# Patient Record
Sex: FEMALE | Race: WHITE | Employment: OTHER | URBAN - METROPOLITAN AREA
[De-identification: names, ages, dates, MRNs, and addresses within clinical notes are randomized per-mention and may not be internally consistent; named-entity substitution may affect disease eponyms.]

---

## 2021-12-19 ENCOUNTER — PREP FOR PROCEDURE (OUTPATIENT)
Dept: OPHTHALMOLOGY | Age: 76
End: 2021-12-19

## 2021-12-19 RX ORDER — PREDNISOLONE ACETATE 10 MG/ML
1 SUSPENSION/ DROPS OPHTHALMIC SEE ADMIN INSTRUCTIONS
Status: CANCELLED | OUTPATIENT
Start: 2021-12-19

## 2021-12-19 RX ORDER — TROPICAMIDE 10 MG/ML
1 SOLUTION/ DROPS OPHTHALMIC SEE ADMIN INSTRUCTIONS
Status: CANCELLED | OUTPATIENT
Start: 2021-12-19

## 2021-12-19 RX ORDER — PHENYLEPHRINE HYDROCHLORIDE 25 MG/ML
1 SOLUTION/ DROPS OPHTHALMIC SEE ADMIN INSTRUCTIONS
Status: CANCELLED | OUTPATIENT
Start: 2021-12-19

## 2021-12-19 RX ORDER — BRIMONIDINE TARTRATE 2 MG/ML
1 SOLUTION/ DROPS OPHTHALMIC SEE ADMIN INSTRUCTIONS
Status: CANCELLED | OUTPATIENT
Start: 2021-12-19

## 2021-12-20 ENCOUNTER — HOSPITAL ENCOUNTER (OUTPATIENT)
Dept: SURGERY | Age: 76
Discharge: HOME | End: 2021-12-20
Payer: MEDICARE

## 2021-12-20 VITALS — SYSTOLIC BLOOD PRESSURE: 157 MMHG | DIASTOLIC BLOOD PRESSURE: 69 MMHG

## 2021-12-20 PROCEDURE — 66821 AFTER CATARACT LASER SURGERY: CPT

## 2021-12-20 PROCEDURE — 6370000000 HC RX 637 (ALT 250 FOR IP): Performed by: OPHTHALMOLOGY

## 2021-12-20 RX ORDER — ESOMEPRAZOLE MAGNESIUM 20 MG/1
FOR SUSPENSION ORAL
COMMUNITY
End: 2022-06-09 | Stop reason: ALTCHOICE

## 2021-12-20 RX ORDER — TROPICAMIDE 10 MG/ML
1 SOLUTION/ DROPS OPHTHALMIC SEE ADMIN INSTRUCTIONS
Status: COMPLETED | OUTPATIENT
Start: 2021-12-20 | End: 2021-12-20

## 2021-12-20 RX ORDER — ZOLPIDEM TARTRATE 10 MG/1
10 TABLET ORAL NIGHTLY PRN
COMMUNITY
Start: 2021-11-29 | End: 2021-12-29

## 2021-12-20 RX ORDER — SERTRALINE HYDROCHLORIDE 100 MG/1
100 TABLET, FILM COATED ORAL DAILY
COMMUNITY
Start: 2021-09-17

## 2021-12-20 RX ORDER — PSEUDOEPHEDRINE HCL 30 MG
100 TABLET ORAL 2 TIMES DAILY
COMMUNITY

## 2021-12-20 RX ORDER — ACYCLOVIR 400 MG/1
400 TABLET ORAL 2 TIMES DAILY
COMMUNITY
Start: 2021-03-17

## 2021-12-20 RX ORDER — FELODIPINE 5 MG/1
5 TABLET, EXTENDED RELEASE ORAL DAILY
COMMUNITY
Start: 2021-07-19

## 2021-12-20 RX ORDER — HYDRALAZINE HYDROCHLORIDE 25 MG/1
25 TABLET, FILM COATED ORAL 2 TIMES DAILY
COMMUNITY
Start: 2021-09-27

## 2021-12-20 RX ORDER — SIMVASTATIN 40 MG
40 TABLET ORAL NIGHTLY
COMMUNITY
Start: 2021-07-07

## 2021-12-20 RX ORDER — LOSARTAN POTASSIUM 100 MG/1
100 TABLET ORAL DAILY
COMMUNITY
Start: 2021-06-15 | End: 2022-05-05 | Stop reason: DRUGHIGH

## 2021-12-20 RX ORDER — SELENIUM 200 MCG
2 TABLET ORAL
COMMUNITY

## 2021-12-20 RX ORDER — BRIMONIDINE TARTRATE 2 MG/ML
1 SOLUTION/ DROPS OPHTHALMIC SEE ADMIN INSTRUCTIONS
Status: COMPLETED | OUTPATIENT
Start: 2021-12-20 | End: 2021-12-20

## 2021-12-20 RX ORDER — PREDNISOLONE ACETATE 10 MG/ML
1 SUSPENSION/ DROPS OPHTHALMIC SEE ADMIN INSTRUCTIONS
Status: COMPLETED | OUTPATIENT
Start: 2021-12-20 | End: 2021-12-20

## 2021-12-20 RX ORDER — GABAPENTIN 300 MG/1
CAPSULE ORAL
COMMUNITY
Start: 2021-07-13 | End: 2022-08-05 | Stop reason: SDUPTHER

## 2021-12-20 RX ORDER — CLONIDINE HYDROCHLORIDE 0.2 MG/1
0.2 TABLET ORAL 3 TIMES DAILY
COMMUNITY
Start: 2021-10-25 | End: 2022-08-23 | Stop reason: DRUGHIGH

## 2021-12-20 RX ORDER — LORATADINE 10 MG/1
10 TABLET ORAL DAILY
COMMUNITY

## 2021-12-20 RX ORDER — ACETAMINOPHEN 325 MG/1
650 TABLET ORAL
COMMUNITY

## 2021-12-20 RX ORDER — ATENOLOL 50 MG/1
50 TABLET ORAL 2 TIMES DAILY
COMMUNITY
Start: 2021-08-10

## 2021-12-20 RX ORDER — PHENYLEPHRINE HCL 2.5 %
1 DROPS OPHTHALMIC (EYE) SEE ADMIN INSTRUCTIONS
Status: COMPLETED | OUTPATIENT
Start: 2021-12-20 | End: 2021-12-20

## 2021-12-20 RX ADMIN — TROPICAMIDE 1 DROP: 10 SOLUTION/ DROPS OPHTHALMIC at 12:40

## 2021-12-20 RX ADMIN — TROPICAMIDE 1 DROP: 10 SOLUTION/ DROPS OPHTHALMIC at 12:37

## 2021-12-20 RX ADMIN — PHENYLEPHRINE HYDROCHLORIDE 1 DROP: 25 SOLUTION/ DROPS OPHTHALMIC at 12:37

## 2021-12-20 RX ADMIN — PHENYLEPHRINE HYDROCHLORIDE 1 DROP: 25 SOLUTION/ DROPS OPHTHALMIC at 12:40

## 2021-12-20 RX ADMIN — PREDNISOLONE ACETATE 1 DROP: 10 SUSPENSION/ DROPS OPHTHALMIC at 13:10

## 2021-12-20 RX ADMIN — PHENYLEPHRINE HYDROCHLORIDE 1 DROP: 25 SOLUTION/ DROPS OPHTHALMIC at 12:32

## 2021-12-20 RX ADMIN — TROPICAMIDE 1 DROP: 10 SOLUTION/ DROPS OPHTHALMIC at 12:32

## 2021-12-20 RX ADMIN — BRIMONIDINE TARTRATE 1 DROP: 2 SOLUTION OPHTHALMIC at 13:10

## 2021-12-20 ASSESSMENT — PAIN - FUNCTIONAL ASSESSMENT: PAIN_FUNCTIONAL_ASSESSMENT: 0-10

## 2021-12-20 NOTE — PROGRESS NOTES
Patient: Kashmir Mejia  1945, 76 y.o./female    Ambulatory to laser room. Right eye marked and numbed by Dr. Taylor Lambert. Laser procedure done and tolerated well by patient. Post  instructions given to Elly Christopher by provider.       Power setting was 1.2 mj        # of shots was 42    Total power was 52.8 mJ    Gladis Jimenes RN

## 2021-12-20 NOTE — OP NOTE
OPERATIVE NOTE    [facility]  [address], [address]  [phone]      12/20/2021    Patient name: Amarjit De La Cruz  YOB: 1945  MRN: [id number]         DATE OF OPERATION: 12/20/2021     SURGEON: Michael Palomares MD  ASSISTANT(S): None           PREOPERATIVE DIAGNOSIS(ES): Posterior Capsule Opacification- Right eye  POSTOPERATIVE DIAGNOSIS(ES):  Same    OPERATION(S) PERFORMED: Posterior capsulotomy per YAG laser- Right eye    ESTIMATED BLOOD LOSS:  None  TYPE OF ANESTHESIA:  Proparacaine (Alcaine) 0.5% one drop to operative eye    NO SPECIMENS OBTAINED    INDICATIONS FOR PROCEDURE:  Patient complains of decrease vision in operative eye    DESCRIPTION OF PROCEDURE: The patient's operative eye was dilated with 1 drop of Tropicamide 1% opthalmic solution and 1 drop of 2.5% phenylephrine ophthalmic solution preoperatively. The patient was brought to the [facility] room. The operative eye was identified. A drop of Proparacaine was placed in the eye. The YAG Capsulotomy lens was placed on the operative eye. The YAG laser was applied and the posterior capsule was opened. The YAG Capsulotomy lens was removed. Alphagan P and Pred Forte 1% were given to the patient operatively in the PACU. The patient was discharged in good condition. ATTENDING ATTESTATION: I was present and scrubbed for the entire procedure.       ELECTRONICALLY SIGNED BY: Michael Palomares MD, 12/20/2021 2:04 PM

## 2022-01-13 RX ORDER — TROPICAMIDE 10 MG/ML
1 SOLUTION/ DROPS OPHTHALMIC SEE ADMIN INSTRUCTIONS
Status: CANCELLED | OUTPATIENT
Start: 2022-01-13

## 2022-01-13 RX ORDER — PHENYLEPHRINE HYDROCHLORIDE 25 MG/ML
1 SOLUTION/ DROPS OPHTHALMIC SEE ADMIN INSTRUCTIONS
Status: CANCELLED | OUTPATIENT
Start: 2022-01-13

## 2022-01-13 RX ORDER — PREDNISOLONE ACETATE 10 MG/ML
1 SUSPENSION/ DROPS OPHTHALMIC SEE ADMIN INSTRUCTIONS
Status: CANCELLED | OUTPATIENT
Start: 2022-01-13

## 2022-01-13 RX ORDER — BRIMONIDINE TARTRATE 2 MG/ML
1 SOLUTION/ DROPS OPHTHALMIC SEE ADMIN INSTRUCTIONS
Status: CANCELLED | OUTPATIENT
Start: 2022-01-13

## 2022-01-14 NOTE — PRE-PROCEDURE INSTRUCTIONS
[address]        Pre-Op Phone Call:     Patient Name: Chandrika Jimenez     Telephone Information:   Mobile [phone]     Home phone:  [phone]    Surgery Time:   0421 96 07 27 Time: 1745      Left extended Message:  Yes     Message left with: Patient      Recent change in health status:  NA       Electronically signed by:  Coni Abdullahi RN at 1/14/2022 10:37 AM

## 2022-01-17 ENCOUNTER — HOSPITAL ENCOUNTER (OUTPATIENT)
Dept: SURGERY | Age: 77
Discharge: HOME | End: 2022-01-17
Payer: MEDICARE

## 2022-01-17 VITALS — SYSTOLIC BLOOD PRESSURE: 197 MMHG | DIASTOLIC BLOOD PRESSURE: 97 MMHG

## 2022-01-17 PROCEDURE — 6370000000 HC RX 637 (ALT 250 FOR IP): Performed by: OPHTHALMOLOGY

## 2022-01-17 PROCEDURE — 66821 AFTER CATARACT LASER SURGERY: CPT

## 2022-01-17 RX ORDER — PREDNISOLONE ACETATE 10 MG/ML
1 SUSPENSION/ DROPS OPHTHALMIC SEE ADMIN INSTRUCTIONS
Status: COMPLETED | OUTPATIENT
Start: 2022-01-17 | End: 2022-01-17

## 2022-01-17 RX ORDER — BRIMONIDINE TARTRATE 2 MG/ML
1 SOLUTION/ DROPS OPHTHALMIC SEE ADMIN INSTRUCTIONS
Status: COMPLETED | OUTPATIENT
Start: 2022-01-17 | End: 2022-01-17

## 2022-01-17 RX ORDER — PHENYLEPHRINE HCL 2.5 %
1 DROPS OPHTHALMIC (EYE) SEE ADMIN INSTRUCTIONS
Status: COMPLETED | OUTPATIENT
Start: 2022-01-17 | End: 2022-01-17

## 2022-01-17 RX ORDER — TROPICAMIDE 10 MG/ML
1 SOLUTION/ DROPS OPHTHALMIC SEE ADMIN INSTRUCTIONS
Status: COMPLETED | OUTPATIENT
Start: 2022-01-17 | End: 2022-01-17

## 2022-01-17 RX ADMIN — PHENYLEPHRINE HYDROCHLORIDE 1 DROP: 25 SOLUTION/ DROPS OPHTHALMIC at 14:46

## 2022-01-17 RX ADMIN — PHENYLEPHRINE HYDROCHLORIDE 1 DROP: 25 SOLUTION/ DROPS OPHTHALMIC at 14:51

## 2022-01-17 RX ADMIN — PHENYLEPHRINE HYDROCHLORIDE 1 DROP: 25 SOLUTION/ DROPS OPHTHALMIC at 14:56

## 2022-01-17 RX ADMIN — BRIMONIDINE TARTRATE 1 DROP: 2 SOLUTION OPHTHALMIC at 15:31

## 2022-01-17 RX ADMIN — TROPICAMIDE 1 DROP: 10 SOLUTION/ DROPS OPHTHALMIC at 14:51

## 2022-01-17 RX ADMIN — PREDNISOLONE ACETATE 1 DROP: 10 SUSPENSION/ DROPS OPHTHALMIC at 15:31

## 2022-01-17 RX ADMIN — TROPICAMIDE 1 DROP: 10 SOLUTION/ DROPS OPHTHALMIC at 14:46

## 2022-01-17 RX ADMIN — TROPICAMIDE 1 DROP: 10 SOLUTION/ DROPS OPHTHALMIC at 14:56

## 2022-01-17 NOTE — PROGRESS NOTES
Discharge instructions reviewed with patient. Written instructions given to patient. Patient verbalizes understanding of instructions.  Guy Richter RN

## 2022-01-17 NOTE — OP NOTE
OPERATIVE NOTE    [facility]  [address], [address]  [phone]      1/17/2022    Patient name: Eugenia Fernandes  YOB: 1945  MRN: [id number]         DATE OF OPERATION: 1/17/2022     SURGEON: Momo Guerrero MD  ASSISTANT(S): None           PREOPERATIVE DIAGNOSIS(ES): Posterior Capsule Opacification- Left eye  POSTOPERATIVE DIAGNOSIS(ES):  Same    OPERATION(S) PERFORMED: Posterior capsulotomy per YAG laser, Left eye    ESTIMATED BLOOD LOSS:  None  TYPE OF ANESTHESIA:  Proparacaine (Alcaine) 0.5% one drop to operative eye    NO SPECIMENS OBTAINED    INDICATIONS FOR PROCEDURE:  Patient complains of decrease vision in operative eye    DESCRIPTION OF PROCEDURE: The patient's operative eye was dilated with 1 drop of Tropicamide 1% opthalmic solution and 1 drop of 2.5% phenylephrine ophthalmic solution preoperatively. The patient was brought to the [facility] room. The operative eye was identified. A drop of Proparacaine was placed in the eye. The YAG Capsulotomy lens was placed on the operative eye. The YAG laser was applied and the posterior capsule was opened. The YAG Capsulotomy lens was removed. Alphagan P and Pred Forte 1% were given to the patient operatively in the PACU. The patient was discharged in good condition. ATTENDING ATTESTATION: I was present and scrubbed for the entire procedure.       ELECTRONICALLY SIGNED BY: Momo Guerrero MD, 1/17/2022 3:20 PM

## 2022-03-07 ENCOUNTER — OFFICE VISIT (OUTPATIENT)
Dept: INTERNAL MEDICINE CLINIC | Age: 77
End: 2022-03-07
Payer: MEDICARE

## 2022-03-07 VITALS
RESPIRATION RATE: 16 BRPM | WEIGHT: 153 LBS | TEMPERATURE: 97.3 F | OXYGEN SATURATION: 98 % | DIASTOLIC BLOOD PRESSURE: 80 MMHG | HEART RATE: 50 BPM | SYSTOLIC BLOOD PRESSURE: 126 MMHG

## 2022-03-07 DIAGNOSIS — Z23 NEED FOR PNEUMOCOCCAL VACCINATION: ICD-10-CM

## 2022-03-07 DIAGNOSIS — G89.29 CHRONIC BILATERAL LOW BACK PAIN WITHOUT SCIATICA: ICD-10-CM

## 2022-03-07 DIAGNOSIS — E78.5 HYPERLIPIDEMIA, UNSPECIFIED HYPERLIPIDEMIA TYPE: ICD-10-CM

## 2022-03-07 DIAGNOSIS — M79.7 FIBROMYALGIA: ICD-10-CM

## 2022-03-07 DIAGNOSIS — R73.01 ELEVATED FASTING GLUCOSE: ICD-10-CM

## 2022-03-07 DIAGNOSIS — M54.50 CHRONIC BILATERAL LOW BACK PAIN WITHOUT SCIATICA: ICD-10-CM

## 2022-03-07 DIAGNOSIS — Z76.89 ENCOUNTER TO ESTABLISH CARE: Primary | ICD-10-CM

## 2022-03-07 DIAGNOSIS — F32.A DEPRESSION, UNSPECIFIED DEPRESSION TYPE: ICD-10-CM

## 2022-03-07 DIAGNOSIS — N18.2 CKD (CHRONIC KIDNEY DISEASE) STAGE 2, GFR 60-89 ML/MIN: ICD-10-CM

## 2022-03-07 DIAGNOSIS — I10 PRIMARY HYPERTENSION: ICD-10-CM

## 2022-03-07 DIAGNOSIS — J45.20 MILD INTERMITTENT ASTHMA WITHOUT COMPLICATION: ICD-10-CM

## 2022-03-07 DIAGNOSIS — Z85.3 HISTORY OF LEFT BREAST CANCER: ICD-10-CM

## 2022-03-07 DIAGNOSIS — Z12.31 ENCOUNTER FOR SCREENING MAMMOGRAM FOR MALIGNANT NEOPLASM OF BREAST: ICD-10-CM

## 2022-03-07 PROCEDURE — G8482 FLU IMMUNIZE ORDER/ADMIN: HCPCS | Performed by: NURSE PRACTITIONER

## 2022-03-07 PROCEDURE — 36415 COLL VENOUS BLD VENIPUNCTURE: CPT | Performed by: NURSE PRACTITIONER

## 2022-03-07 PROCEDURE — 1123F ACP DISCUSS/DSCN MKR DOCD: CPT | Performed by: NURSE PRACTITIONER

## 2022-03-07 PROCEDURE — G8421 BMI NOT CALCULATED: HCPCS | Performed by: NURSE PRACTITIONER

## 2022-03-07 PROCEDURE — 4040F PNEUMOC VAC/ADMIN/RCVD: CPT | Performed by: NURSE PRACTITIONER

## 2022-03-07 PROCEDURE — 99204 OFFICE O/P NEW MOD 45 MIN: CPT | Performed by: NURSE PRACTITIONER

## 2022-03-07 PROCEDURE — G8400 PT W/DXA NO RESULTS DOC: HCPCS | Performed by: NURSE PRACTITIONER

## 2022-03-07 PROCEDURE — 90732 PPSV23 VACC 2 YRS+ SUBQ/IM: CPT | Performed by: NURSE PRACTITIONER

## 2022-03-07 PROCEDURE — G0009 ADMIN PNEUMOCOCCAL VACCINE: HCPCS | Performed by: NURSE PRACTITIONER

## 2022-03-07 PROCEDURE — 1090F PRES/ABSN URINE INCON ASSESS: CPT | Performed by: NURSE PRACTITIONER

## 2022-03-07 PROCEDURE — G8427 DOCREV CUR MEDS BY ELIG CLIN: HCPCS | Performed by: NURSE PRACTITIONER

## 2022-03-07 PROCEDURE — 1036F TOBACCO NON-USER: CPT | Performed by: NURSE PRACTITIONER

## 2022-03-07 RX ORDER — METHOCARBAMOL 750 MG/1
TABLET, FILM COATED ORAL
COMMUNITY
Start: 2022-01-24 | End: 2022-06-07 | Stop reason: ALTCHOICE

## 2022-03-07 RX ORDER — ZOLPIDEM TARTRATE 10 MG/1
10 TABLET ORAL NIGHTLY PRN
COMMUNITY
Start: 2022-02-28 | End: 2022-03-24 | Stop reason: SDUPTHER

## 2022-03-07 RX ORDER — VIT C/HESPERIDIN/BIOFLAVONOIDS 500-100 MG
TABLET ORAL
COMMUNITY

## 2022-03-07 SDOH — ECONOMIC STABILITY: FOOD INSECURITY: WITHIN THE PAST 12 MONTHS, YOU WORRIED THAT YOUR FOOD WOULD RUN OUT BEFORE YOU GOT MONEY TO BUY MORE.: NEVER TRUE

## 2022-03-07 SDOH — ECONOMIC STABILITY: FOOD INSECURITY: WITHIN THE PAST 12 MONTHS, THE FOOD YOU BOUGHT JUST DIDN'T LAST AND YOU DIDN'T HAVE MONEY TO GET MORE.: NEVER TRUE

## 2022-03-07 ASSESSMENT — PATIENT HEALTH QUESTIONNAIRE - PHQ9
SUM OF ALL RESPONSES TO PHQ9 QUESTIONS 1 & 2: 0
SUM OF ALL RESPONSES TO PHQ QUESTIONS 1-9: 0
2. FEELING DOWN, DEPRESSED OR HOPELESS: 0
SUM OF ALL RESPONSES TO PHQ QUESTIONS 1-9: 0
SUM OF ALL RESPONSES TO PHQ QUESTIONS 1-9: 0
1. LITTLE INTEREST OR PLEASURE IN DOING THINGS: 0
SUM OF ALL RESPONSES TO PHQ QUESTIONS 1-9: 0

## 2022-03-07 ASSESSMENT — ENCOUNTER SYMPTOMS
BLOOD IN STOOL: 0
CONSTIPATION: 0
BACK PAIN: 1
VOMITING: 0
ABDOMINAL PAIN: 0
COUGH: 0
DIARRHEA: 0
SHORTNESS OF BREATH: 0
NAUSEA: 0

## 2022-03-07 ASSESSMENT — SOCIAL DETERMINANTS OF HEALTH (SDOH): HOW HARD IS IT FOR YOU TO PAY FOR THE VERY BASICS LIKE FOOD, HOUSING, MEDICAL CARE, AND HEATING?: NOT HARD AT ALL

## 2022-03-07 NOTE — PROGRESS NOTES
6/2020  Osteoporosis Screening: normal bone density DEXA 12/2020             There are no problems to display for this patient. Past Medical History:   Diagnosis Date    Arthritis     Asthma     Cancer ([address]     Breast    Chronic kidney disease     Fibromyalgia     Hx of blood clots     Hyperlipidemia     Hypertension     Shingles        Past Surgical History:   Procedure Laterality Date    ARM SURGERY Right     tendonitis    BACK SURGERY Left 2022    BLADDER SURGERY N/A 2019    lift    BREAST SURGERY      CAROTID ENDARTERECTOMY Right     COLONOSCOPY      ENDOSCOPY, COLON, DIAGNOSTIC      EYE SURGERY      HYSTERECTOMY      TONSILLECTOMY         No results found for any previous visit. Family History   Problem Relation Age of Onset    Cerebral palsy Brother     Colon Polyps Brother     Colon Polyps Brother        Current Outpatient Medications   Medication Sig Dispense Refill    methocarbamol (ROBAXIN) 750 MG tablet take 1 tablet by oral route  [facility]      Zinc 30 MG TABS Take by mouth      zolpidem (AMBIEN) 10 MG tablet Take 10 mg by mouth nightly as needed.       TURMERIC CURCUMIN PO Take 2 capsules by mouth daily      ALBUTEROL IN Inhale into the lungs      acetaminophen (TYLENOL) 325 MG tablet Take 650 mg by mouth      acyclovir (ZOVIRAX) 400 MG tablet Take 400 mg by mouth 2 times daily      atenolol (TENORMIN) 50 MG tablet Take 50 mg by mouth nightly      vitamin D3 (CHOLECALCIFEROL) 125 MCG (5000 UT) TABS tablet Take 3 tablets by mouth daily      cloNIDine (CATAPRES) 0.2 MG tablet Take 0.2 mg by mouth 3 times daily      docusate (COLACE, DULCOLAX) 100 MG CAPS Take 100 mg by mouth 2 times daily      Echinacea 380 MG CAPS Take 2 capsules by mouth      esomeprazole Magnesium (NEXIUM) 20 MG PACK Take by mouth      felodipine (PLENDIL) 5 MG extended release tablet Take 5 mg by mouth daily      hydrALAZINE (APRESOLINE) 25 MG tablet Take 25 mg by mouth 2 times daily      loratadine (CLARITIN) 10 MG tablet Take 10 mg by mouth daily      losartan (COZAAR) 100 MG tablet Take 100 mg by mouth daily      sertraline (ZOLOFT) 100 MG tablet Take 100 mg by mouth daily      simvastatin (ZOCOR) 40 MG tablet Take 40 mg by mouth nightly      gabapentin (NEURONTIN) 300 MG capsule TAKE 1 CAPSULE BY MOUTH THREE TIMES DAILY       No current facility-administered medications for this visit. Allergies   Allergen Reactions    Alcohol Other (See Comments)    Amlodipine Other (See Comments) and Nausea Only    Lisinopril Other (See Comments)    Omeprazole Other (See Comments)     abd pain. Triggers diverticulitis  abd pain. Triggers diverticulitis      Tizanidine Other (See Comments)     Muscles jerked  Muscles jerked         Review of Systems   Constitutional: Negative for appetite change, chills and fever. Respiratory: Negative for cough and shortness of breath. Cardiovascular: Negative for chest pain and leg swelling. Gastrointestinal: Negative for abdominal pain, blood in stool, constipation, diarrhea, nausea and vomiting. Genitourinary: Negative for difficulty urinating. Musculoskeletal: Positive for arthralgias and back pain. Low back pain  Chronic arthritic pain hips and knees   Skin: Negative. Neurological: Negative for dizziness, syncope and headaches. Psychiatric/Behavioral: Negative for dysphoric mood and sleep disturbance. The patient is not nervous/anxious. Vitals:  /80 (Site: Left Upper Arm, Position: Sitting, Cuff Size: Medium Adult)   Pulse 50   Temp 97.3 °F (36.3 °C)   Resp 16   Wt 153 lb (69.4 kg)   SpO2 98%     Physical Exam  Vitals reviewed. Constitutional:       General: She is not in acute distress. Appearance: Normal appearance. HENT:      Head: Normocephalic and atraumatic. Right Ear: Tympanic membrane, ear canal and external ear normal. There is impacted cerumen.       Left Ear: Tympanic membrane, ear canal and external ear normal.   Cardiovascular:      Rate and Rhythm: Normal rate and regular rhythm. Heart sounds: Normal heart sounds. No murmur heard. Pulmonary:      Effort: Pulmonary effort is normal.      Breath sounds: Normal breath sounds. Abdominal:      General: Bowel sounds are normal. There is no distension. Palpations: Abdomen is soft. Tenderness: There is no abdominal tenderness. Musculoskeletal:      Right lower leg: No edema. Left lower leg: No edema. Skin:     General: Skin is warm and dry. Neurological:      General: No focal deficit present. Mental Status: She is alert and oriented to person, place, and time. Psychiatric:         Mood and Affect: Mood normal.         Behavior: Behavior normal.         Thought Content: Thought content normal.         Judgment: Judgment normal.         Assessment/Plan     1. Encounter to establish care  - Shingles vaccine cost prohibitive  - Labs in care everywhere 2/7/2022 reviewed  - Will be due for follow up DEXA 12/2022    2. Primary hypertension: stable. - Continue current medications. Follows with nephrology. 3. Fibromyalgia: not currently followed by rheumatology  - López Gomez MD, Rheumatology, [facility]   -Patient reports that she is managed with gabapentin and Ambien. Ambien not usual fibromyalgia medication. Discussed with patient that I will renew prescription until she is seen by rheumatology and then will defer to them. If they do not feel this is appropriate treatment will need to taper off medication.   -Medication contract completed today    4. Depression, unspecified depression type: stable   - Continue current medication    5. Hyperlipidemia, unspecified hyperlipidemia type: stable   - Lipid panel 2/7/2022 out side labs reviewed   - Continue current medication    6. CKD (chronic kidney disease) stage 2, GFR 60-89 ml/min: followed by Dr Barbara Goncalves    7.  Mild intermittent asthma without complication: stable    8. Chronic bilateral low back pain without sciatica: follows with orthopedics    9. History of left breast cancer  - BAILEY DIGITAL SCREEN W OR WO CAD BILATERAL; Future    10. Encounter for screening mammogram for malignant neoplasm of breast  - [facility]LEY DIGITAL SCREEN W OR WO CAD BILATERAL; Future    11. Elevated fasting glucose  - Hemoglobin A1C    12. Need for pneumococcal vaccination  - PNEUMOVAX 23 subcutaneous/IM (Pneumococcal polysaccharide vaccine 23-valent >= 3yo)      Orders Placed This Encounter   Procedures    BAILEY DIGITAL SCREEN W OR WO CAD BILATERAL     Standing Status:   Future     Standing Expiration Date:   5/7/2023     Order Specific Question:   Reason for exam:     Answer:   screening mammogram, h/o left breast CA    PNEUMOVAX 23 subcutaneous/IM (Pneumococcal polysaccharide vaccine 23-valent >= 3yo)    Hemoglobin A1C    Yue Hawkins MD, Rheumatology, [facility]     Referral Priority:   Routine     Referral Type:   Eval and Treat     Referral Reason:   Specialty Services Required     Referred to Provider:   Mohan Dukes MD     Requested Specialty:   Rheumatology     Number of Visits Requested:   1       Return in about 3 months (around 6/7/2022). OR sooner with questions, concerns, worsening symptoms    BRANDON CORBETT, NP    3/7/2022  3:05 PM    Discussed use, benefit, and side effects of prescribed medications. Barriers to medication compliance addressed. Discussed all ordered testing and labs. All patient questions answered. Patient agreeable with plan above. Please note that this chart was generated using dragon dictation software. Although every effort was made to ensure the accuracy of this automated transcription, some errors in transcription may have occurred.

## 2022-03-07 NOTE — LETTER
CONTROLLED SUBSTANCE MEDICATION AGREEMENT     Patient Name: Suzi Heard  Patient YOB: 1945   Medication: Ambien  I understand, that controlled substance medications may be used to help better manage my symptoms and to improve my ability to function at home, work and in social settings. However, I also understand that these medications do have risks, which have been discussed with me, including possible development of physical or psychological dependence. I understand that successful treatment requires mutual trust and honesty between me and my provider. I understand and agree that following this Medication Agreement is necessary in continuing my provider-patient relationship and the success of my treatment plan. Explanation from my Provider: Benefits and Goals of Controlled Substance Medications: There are two potential goals for your treatment: (1) decreased pain and suffering (2) improved daily life functions. There are many possible treatments for your chronic condition(s). Alternatives such as physical therapy, yoga, massage, home daily exercise, meditation, relaxation techniques, injections, chiropractic manipulations, surgery, cognitive therapy, hypnosis and many medications that are not habit-forming may be used. Use of controlled substance medications may be helpful, but they are unlikely to resolve all symptoms or restore all function. Explanation from my Provider: Risks of Controlled Substance Medications:  Opioid pain medications: These medications can lead to problems such as addiction/dependence, sedation, lightheadedness/dizziness, memory issues, falls, constipation, nausea, or vomiting. They may also impair the ability to drive or operate machinery. Additionally, these medications may lower testosterone levels, leading to loss of bone strength, stamina and sex drive.   They may cause problems with breathing, sleep apnea and reduced coughing, which is especially dangerous for patients with lung disease. Overdose or dangerous interactions with alcohol and other medications may occur, leading to death. Hyperalgesia may develop, which means patients receiving opioids for the treatment of pain may become more sensitive to certain painful stimuli, and in some cases, experience pain from ordinarily non-painful stimuli. Women between the ages of 14-53 who could become pregnant should carefully weigh the risks and benefits of opioids with their physicians, as these medications increase the risk of pregnancy complications, including miscarriage,  delivery and stillbirth. It is also possible for babies to be born addicted to opioids. Opioid dependence withdrawal symptoms may include; feelings of uneasiness, increased pain, irritability, belly pain, diarrhea, sweats and goose-flesh. Benzodiazepines and non-benzodiazepine sleep medications: These medications can lead to problems such as addiction/dependence, sedation, fatigue, lightheadedness, dizziness, incoordination, falls, depression, hallucinations, and impaired judgment, memory and concentration. The ability to drive and operate machinery may also be affected. Abnormal sleep-related behaviors have been reported, including sleepwalking, driving, making telephone calls, eating, or having sex while not fully awake. These medications can suppress breathing and worsen sleep apnea, particularly when combined with alcohol or other sedating medications, potentially leading to death. Dependence withdrawal symptoms may include tremors, anxiety, hallucinations and seizures. Stimulants:  Common adverse effects include addiction/dependence, increased blood  pressure and heart rate, decreased appetite, nausea, involuntary weight loss, insomnia,                                                                                                                     Initials:_______   irritability, and headaches.   These risks may increase when these medications are combined with other stimulants, such as caffeine pills or energy drinks, certain weight loss supplements and oral decongestants. Dependence withdrawal symptoms may include depressed mood, loss of interest, suicidal thoughts, anxiety, fatigue, appetite changes and agitation. Testosterone replacement therapy:  Potential side effects include increased risk of stroke and heart attack, blood clots, increased blood pressure, increased cholesterol, enlarged prostate, sleep apnea, irritability/aggression and other mood disorders, and decreased fertility. I agree and understand that I and my prescriber have the following rights and responsibilities regarding my treatment plan:     1. MY RIGHTS:  To be informed of my treatment and medication plan. To be an active participant in my health and wellbeing. 2. MY RESPONSIBILITY AND UNDERSTANDING FOR USE OF MEDICATIONS   I will take medications at the dose and frequency as directed. For my safety, I will not increase or change how I take my medications without the recommendation of my healthcare provider.  I will actively participate in any program recommended by my provider which may improve function, including social, physical, psychological programs.  I will not take my medications with alcohol or other drugs not prescribed to me. I understand that drinking alcohol with my medications increases the chances of side effects, including reduced breathing rate and could lead to personal injury when operating machinery.  I understand that if I have a history of substance use disorders, including alcohol or other illicit drugs, that I may be at increased risk of addiction to my medications.  I agree to notify my provider immediately if I should become pregnant so that my treatment plan can be adjusted.    I agree and understand that I shall only receive controlled substance medications from the prescriber that signed this agreement unless there is written agreement among other prescribers of controlled substances outlining the responsibility of the medications being prescribed.  I understand that the if the controlled medication is not helping to achieve goals, the dosage may be tapered and no longer prescribed. 3. MY RESPONSIBILITY FOR COMMUNICATION / PRESCRIPTION RENEWALS   I agree that all controlled substance medications that I take will be prescribed only by my provider. If another healthcare provider prescribes me medication in an emergency, I will notify my provider within seventy-two (72) hours.  I will arrange for refills at the prescribed interval ONLY during regular office hours. I will not ask for refills earlier than agreed, after-hours, on holidays or weekends. Refills may take up to 72 hours for processing and prescriptions to reach the pharmacy.  I will inform my other health care providers that I am taking these medications and of the existence of this [id number]. In the event of an emergency, I will provide the same information to the emergency department prescribers.  I will keep my provider updated on the pharmacy I am using for controlled medication prescription filling. Initials:_______  4. MY RESPONSIBILITY FOR PROTECTING MEDICATIONS   I will protect my prescriptions and medications. I understand that lost or misplaced prescriptions will not be replaced.  I will keep medications only for my own use and will not share them with others. I will keep all medications away from children.  I agree that if my medications are adjusted or discontinued, I will properly dispose of any remaining medications. I understand that I will be required to dispose of any remaining controlled medications as, directed by my prescriber, prior to being provided with any prescriptions for other controlled medications.   Medication drop box locations can be found at: [employer]    5. MY RESPONSIBILITY WITH ILLEGAL DRUGS    I will not use illegal or street drugs or another person's prescription medications not prescribed to me.  If there are identified addiction type symptoms, then referral to a program may be provided by my provider and I agree to follow through with this recommendation. 6. MY RESPONSIBILITY FOR COOPERATION WITH INVESTIGATIONS   I understand that my provider will comply with any applicable law and may discuss my use and/or possible misuse/abuse of controlled substances and alcohol, as appropriate, with any health care provider involved in my care, pharmacist, or legal authority.  I authorize my provider and pharmacy to cooperate fully with law enforcement agencies (as permitted by law) in the investigation of any possible misuse, sale, or other diversion of my controlled substances.  I agree to waive any applicable privilege or right of privacy or confidentiality with respect to these authorizations. 7. PROVIDERS RIGHT TO MONITOR FOR SAFETY: PRESCRIPTION MONITORING / DRUG TESTING   I consent to drug/toxicology screening and will submit to a drug screen upon my providers request to assure I am only taking the prescribed drugs for my safety monitoring. I understand that a drug screen is a laboratory test in which a sample of my urine, blood or saliva is checked to see what drugs I have been taking. This may entail an observed urine specimen, which means that a nurse or other health care provider may watch me provide urine, and I will cooperate if I am asked to provide an observed specimen.  I understand that my provider will check a copy of my State Prescription Monitoring Program () Report in order to safely prescribe medications.  Pill Counts: I consent to pill counts when requested.   I may be asked to bring all my prescribed controlled substance medications, in their original bottles, to all of my scheduled appointments. In addition, my provider may ask me to come to the practice at any time for a random pill count. 8. TERMINATION OF THIS AGREEMENT  For my safety, my prescriber has the right to stop prescribing controlled substance medications and may end this agreement. Initials:_______   Conditions that may result in termination of this agreement:  a. I do not show any improvement in pain, or my activity has not improved. b. I develop rapid tolerance or loss of improvement, as described in my treatment plan.  c. I develop significant side effects from the medication. d. My behavior is not consistent with the responsibilities outlined above, thereby causing safety concerns to continue prescribing controlled substance medications. e. I fail to follow the terms of this agreement. f. Other:____________________________       UNDERSTANDING THIS MEDICATION AGREEMENT:    I have read the above and have had all my questions answered. For chronic disease management, I know that my symptoms can be managed with many types of treatments. A chronic medication trial may be part of my treatment, but I must be an active participant in my care. Medication therapy is only one part of my symptom management plan. In some cases, there may be limited scientific evidence to support the chronic use of certain medications to improve symptoms and daily function. Furthermore, in certain circumstances, there may be scientific information that suggests that the use of chronic controlled substances may worsen my symptoms and increase my risk of unintentional death directly related to this medication therapy. I know that if my provider feels my risk from controlled medications is greater than my benefit, I will have my controlled substance medication(s) compassionately lowered or removed altogether.      I further agree to allow this office to contact my HIPAA contact if there are concerns about my safety and use of the controlled medications. I have agreed to use the prescribed controlled substance medications to me as instructed by my provider and as stated in this Medication Agreement. My initial on each page and my signature below shows that I have read each page and I have had the opportunity to ask questions with answers provided by my provider.     Patient Name (Printed): _____________________________________  Patient Signature:  ______________________   Date: _____________    Prescriber Name (Printed): ___________________________________  Prescriber Signature: _____________________  Date: _____________

## 2022-03-08 ENCOUNTER — OFFICE VISIT (OUTPATIENT)
Dept: ENT CLINIC | Age: 77
End: 2022-03-08
Payer: MEDICARE

## 2022-03-08 VITALS
HEIGHT: 67 IN | HEART RATE: 55 BPM | BODY MASS INDEX: 23.61 KG/M2 | DIASTOLIC BLOOD PRESSURE: 83 MMHG | WEIGHT: 150.4 LBS | SYSTOLIC BLOOD PRESSURE: 168 MMHG

## 2022-03-08 DIAGNOSIS — J32.0 CHRONIC MAXILLARY SINUSITIS: ICD-10-CM

## 2022-03-08 DIAGNOSIS — R09.81 NASAL CONGESTION: ICD-10-CM

## 2022-03-08 DIAGNOSIS — R51.9 FACIAL PAIN: Primary | ICD-10-CM

## 2022-03-08 DIAGNOSIS — J31.0 CHRONIC RHINITIS: ICD-10-CM

## 2022-03-08 DIAGNOSIS — J34.2 DEVIATED NASAL SEPTUM: ICD-10-CM

## 2022-03-08 LAB
ESTIMATED AVERAGE GLUCOSE: 91.1 MG/DL
HBA1C MFR BLD: 4.8 %

## 2022-03-08 PROCEDURE — 1090F PRES/ABSN URINE INCON ASSESS: CPT | Performed by: STUDENT IN AN ORGANIZED HEALTH CARE EDUCATION/TRAINING PROGRAM

## 2022-03-08 PROCEDURE — 99204 OFFICE O/P NEW MOD 45 MIN: CPT | Performed by: STUDENT IN AN ORGANIZED HEALTH CARE EDUCATION/TRAINING PROGRAM

## 2022-03-08 PROCEDURE — G8427 DOCREV CUR MEDS BY ELIG CLIN: HCPCS | Performed by: STUDENT IN AN ORGANIZED HEALTH CARE EDUCATION/TRAINING PROGRAM

## 2022-03-08 PROCEDURE — 4040F PNEUMOC VAC/ADMIN/RCVD: CPT | Performed by: STUDENT IN AN ORGANIZED HEALTH CARE EDUCATION/TRAINING PROGRAM

## 2022-03-08 PROCEDURE — G8482 FLU IMMUNIZE ORDER/ADMIN: HCPCS | Performed by: STUDENT IN AN ORGANIZED HEALTH CARE EDUCATION/TRAINING PROGRAM

## 2022-03-08 PROCEDURE — G8400 PT W/DXA NO RESULTS DOC: HCPCS | Performed by: STUDENT IN AN ORGANIZED HEALTH CARE EDUCATION/TRAINING PROGRAM

## 2022-03-08 PROCEDURE — 1123F ACP DISCUSS/DSCN MKR DOCD: CPT | Performed by: STUDENT IN AN ORGANIZED HEALTH CARE EDUCATION/TRAINING PROGRAM

## 2022-03-08 PROCEDURE — G8420 CALC BMI NORM PARAMETERS: HCPCS | Performed by: STUDENT IN AN ORGANIZED HEALTH CARE EDUCATION/TRAINING PROGRAM

## 2022-03-08 PROCEDURE — 1036F TOBACCO NON-USER: CPT | Performed by: STUDENT IN AN ORGANIZED HEALTH CARE EDUCATION/TRAINING PROGRAM

## 2022-03-08 PROCEDURE — 31231 NASAL ENDOSCOPY DX: CPT | Performed by: STUDENT IN AN ORGANIZED HEALTH CARE EDUCATION/TRAINING PROGRAM

## 2022-03-08 RX ORDER — DOXYCYCLINE HYCLATE 100 MG
100 TABLET ORAL 2 TIMES DAILY
Qty: 14 TABLET | Refills: 0 | Status: SHIPPED | OUTPATIENT
Start: 2022-03-08 | End: 2022-03-15

## 2022-03-08 RX ORDER — METHYLPREDNISOLONE 4 MG/1
TABLET ORAL
Qty: 1 KIT | Refills: 0 | Status: SHIPPED | OUTPATIENT
Start: 2022-03-08 | End: 2022-03-14

## 2022-03-08 NOTE — PROGRESS NOTES
Jarad Lucero (:  1945) is a 67 y.o. female, here for evaluation of the following chief complaint(s):  Sinus Problem (Headache / pressure above L. Eye x 1 month)      ASSESSMENT/PLAN:  1. Facial pain  2. Chronic maxillary sinusitis  -     CT SINUS FOR IMAGE GUIDANCE; Future  3. Deviated nasal septum  -     CT SINUS FOR IMAGE GUIDANCE; Future  4. Nasal congestion      This is a very pleasant 67 y.o. female here today for evaluation of the the above-noted complaints. The patient reports an extended period of unilateral facial pain and pressure, purulent nasal drainage and symptoms consistent with possible underlying chronic sinusitis. Due to the unilateral nature, there could be something such as odontogenic sinusitis causing her symptoms. I would like to start the patient on doxycycline 100 mg twice daily. We will also prescribe her a Medrol Dosepak. I have asked her to obtain a posttreatment CT scan. Depending what that shows, we will determine whether or not further medical or surgical therapy is indicated. The risks of high dose steroids were discussed with the patient in detail. Specifically, the risks of mood changes, memory behavioral or other psychological effects, weight/appetite gain, increased risk of glaucoma or cataracts, blood sugar elevations, osteoporosis, aseptic  femoral head necrosis, peptic ulcer/GI distress, fluid retention, adrenal gland suppression and increased risk of infections. The patient expressed understanding of the risks and wished to proceed with therapy. The risks, benefits and alternatives to antibiotics were discussed with patient in detail including but not limited to the risk of GI upset, xerostomia, anaphylaxis and rash/ sun sensitivity. The patient was instructed to contact me should they develop any adverse reactions or have other concerns.       Medical Decision Making: The following items were considered in medical decision making:  Independent review of images  Review / order clinical lab tests  Review / order radiology tests  Decision to obtain old records  Review and summation of old records as accessed through [facility] if applicable    SUBJECTIVE/OBJECTIVE:  HPI    Conchita Adorno is here today for evaluation of      -Sx for at least 6 weeks  -Severe L sided facial pressure and pain  -Sense of smell is OK  -Frequent nasal drainage- exacerbated in early morning, when eating, causes sneezing, times green or yellow  -Has had abx in the past for her sinus infections.   -Uses Afrin about once per week  -Usually gets 2-3 sinus infections per year  -Has tried oral steroids  -Takes loratidine  -No trouble breathing through nose but feels congested. -Denies a history of asthma. REVIEW OF SYSTEMS  The following systems were reviewed and revealed the following in addition to any already discussed in the HPI:    PHYSICAL EXAM    GENERAL: No acute distress, alert and oriented, no hoarseness, strong voice  EYES: EOMI, Anti-icteric  HENT:   Head: Normocephalic and atraumatic. Face:  Symmetric, facial nerve intact  Right Ear: Normal external ear, normal external auditory canal, intact tympanic membrane with normal mobility and aerated middle ear  Left Ear: Normal external ear, normal external auditory canal, intact tympanic membrane with normal mobility and aerated middle ear  Mouth/Oral Cavity:  normal lips, Uvula is midline, no mucosal lesions, no trismus, normal dentition, normal salivary quality/flow  Oropharynx/Larynx:  normal oropharynx,   Nose:Normal external nasal appearance. Anterior rhinoscopy shows a slightly deviated nasal septum as well as some crusting in the nares with swelling of the turbinates.   Normal mucosa   NECK: Normal range of motion, no thyromegaly, trachea is midline, no lymphadenopathy, no neck masses, no kendrick          PROCEDURE  Nasal Endoscopy (CPT code 13680)    Preop: chronic rhinitis  Postop: Same    Verbal consent was received. After topical anesthesia and decongestion had been obtained using aerosolized 1% lidocaine and oxymetazoline, a 45 degree rigid endoscope was placed into both nares with the patient in a sitting position. The following was observed:      Septum: intact and deviated to the right with a bony spur posteriorly  Other:   -The inferior and middle turbinates were examined. The middle meatus, and sphenoethmoid recess was examined bilaterally.    -Swelling and erythematous changes to the nasal mucosa without evidence of obvious purulence. .   -There were no complications. Tolerated well without complication. I attest that I was present for and did the entire procedure myself. This note was generated completely or in part utilizing Dragon dictation speech recognition software. Occasionally, words are mistranscribed and despite editing, the text may contain inaccuracies due to incorrect word recognition. If further clarification is needed please contact the office at [phone]. An electronic signature was used to authenticate this note.     --Carlos Armstrong MD

## 2022-03-15 ENCOUNTER — OFFICE VISIT (OUTPATIENT)
Dept: ENT CLINIC | Age: 77
End: 2022-03-15
Payer: MEDICARE

## 2022-03-15 ENCOUNTER — HOSPITAL ENCOUNTER (OUTPATIENT)
Dept: CT IMAGING | Age: 77
Discharge: HOME | End: 2022-03-15
Payer: MEDICARE

## 2022-03-15 VITALS
DIASTOLIC BLOOD PRESSURE: 68 MMHG | HEIGHT: 67 IN | BODY MASS INDEX: 23.86 KG/M2 | WEIGHT: 152 LBS | SYSTOLIC BLOOD PRESSURE: 115 MMHG | HEART RATE: 52 BPM

## 2022-03-15 DIAGNOSIS — R09.81 NASAL CONGESTION: ICD-10-CM

## 2022-03-15 DIAGNOSIS — J31.0 CHRONIC RHINITIS: ICD-10-CM

## 2022-03-15 DIAGNOSIS — J34.2 DEVIATED NASAL SEPTUM: ICD-10-CM

## 2022-03-15 DIAGNOSIS — R51.9 FACIAL PAIN: Primary | ICD-10-CM

## 2022-03-15 DIAGNOSIS — J32.0 CHRONIC MAXILLARY SINUSITIS: ICD-10-CM

## 2022-03-15 DIAGNOSIS — M54.2 NECK PAIN: ICD-10-CM

## 2022-03-15 PROCEDURE — 70486 CT MAXILLOFACIAL W/O DYE: CPT

## 2022-03-15 PROCEDURE — 1036F TOBACCO NON-USER: CPT | Performed by: STUDENT IN AN ORGANIZED HEALTH CARE EDUCATION/TRAINING PROGRAM

## 2022-03-15 PROCEDURE — G8482 FLU IMMUNIZE ORDER/ADMIN: HCPCS | Performed by: STUDENT IN AN ORGANIZED HEALTH CARE EDUCATION/TRAINING PROGRAM

## 2022-03-15 PROCEDURE — G8420 CALC BMI NORM PARAMETERS: HCPCS | Performed by: STUDENT IN AN ORGANIZED HEALTH CARE EDUCATION/TRAINING PROGRAM

## 2022-03-15 PROCEDURE — 1123F ACP DISCUSS/DSCN MKR DOCD: CPT | Performed by: STUDENT IN AN ORGANIZED HEALTH CARE EDUCATION/TRAINING PROGRAM

## 2022-03-15 PROCEDURE — 99213 OFFICE O/P EST LOW 20 MIN: CPT | Performed by: STUDENT IN AN ORGANIZED HEALTH CARE EDUCATION/TRAINING PROGRAM

## 2022-03-15 PROCEDURE — G8400 PT W/DXA NO RESULTS DOC: HCPCS | Performed by: STUDENT IN AN ORGANIZED HEALTH CARE EDUCATION/TRAINING PROGRAM

## 2022-03-15 PROCEDURE — 4040F PNEUMOC VAC/ADMIN/RCVD: CPT | Performed by: STUDENT IN AN ORGANIZED HEALTH CARE EDUCATION/TRAINING PROGRAM

## 2022-03-15 PROCEDURE — G8427 DOCREV CUR MEDS BY ELIG CLIN: HCPCS | Performed by: STUDENT IN AN ORGANIZED HEALTH CARE EDUCATION/TRAINING PROGRAM

## 2022-03-15 PROCEDURE — 1090F PRES/ABSN URINE INCON ASSESS: CPT | Performed by: STUDENT IN AN ORGANIZED HEALTH CARE EDUCATION/TRAINING PROGRAM

## 2022-03-15 NOTE — PROGRESS NOTES
Jarad Lucero (:  1945) is a 67 y.o. female, here for evaluation of the following chief complaint(s):  Results      ASSESSMENT/PLAN:  1. Facial pain  2. Deviated nasal septum  3. Nasal congestion  4. Chronic rhinitis  5. Neck pain      This is a very pleasant 67 y.o. female here today for evaluation of the the above-noted complaints. The patient reports an extended period of unilateral facial pain and pressure, purulent nasal drainage. I independently reviewed her CT sinus scan and it shows evidence of a large right-sided navdeep bullosa, small mucous retention cyst of the right maxillary sinus, but otherwise well aerated sinuses and middle ear spaces. I cannot appreciate any retro-orbital or orbital pathology. We discussed that I do not feel her symptoms are related to chronic sinusitis. Sometimes nasal allergies can have similar symptoms to sinus infections and we discussed this in detail. We discussed that some of her symptoms could be related to musculoskeletal etiology. The fact that it starts in her shoulder rotates up over her head and into her eye would suggest that some of her symptoms are related to that. We discussed a referral to an atypical vein specialist or something such as physical therapy or acupuncture. She would like to think about this. Medical Decision Making:   The following items were considered in medical decision making:  Independent review of images  Review / order clinical lab tests  Review / order radiology tests  Decision to obtain old records  Review and summation of old records as accessed through [facility] if applicable    SUBJECTIVE/OBJECTIVE:  RAFFI Jane is here today for evaluation of      -Sx for at least 6 weeks  -Severe L sided facial pressure and pain  -Sense of smell is OK  -Frequent nasal drainage- exacerbated in early morning, when eating, causes sneezing, times green or yellow  -Has had abx in the past for her sinus infections.   -Uses Afrin about once per week  -Usually gets 2-3 sinus infections per year  -Has tried oral steroids  -Takes loratidine  -No trouble breathing through nose but feels congested. -Denies a history of asthma. Update 3/15/2022:  Patient reports no change in symptoms since I saw her last.  Still having pain behind her left eye that extends to the back of her head and down into her neck. REVIEW OF SYSTEMS  The following systems were reviewed and revealed the following in addition to any already discussed in the HPI:    PHYSICAL EXAM    GENERAL: No acute distress, alert and oriented, no hoarseness, strong voice  EYES: EOMI, Anti-icteric  HENT:   Head: Normocephalic and atraumatic. Face:  Symmetric, facial nerve intact  Right Ear: Normal external ear, normal external auditory canal, intact tympanic membrane with normal mobility and aerated middle ear  Left Ear: Normal external ear, normal external auditory canal, intact tympanic membrane with normal mobility and aerated middle ear  Mouth/Oral Cavity:  normal lips, Uvula is midline, no mucosal lesions, no trismus, normal dentition, normal salivary quality/flow  Oropharynx/Larynx:  normal oropharynx,   Nose:Normal external nasal appearance. Anterior rhinoscopy shows a slightly deviated nasal septum as well as some crusting in the nares with swelling of the turbinates. Normal mucosa   NECK: Normal range of motion, no thyromegaly, trachea is midline, no lymphadenopathy, no neck masses, no crepitus          PROCEDURE  Nasal Endoscopy (CPT code 22222) from prior visit  Preop: chronic rhinitis  Postop: Same    Verbal consent was received. After topical anesthesia and decongestion had been obtained using aerosolized 1% lidocaine and oxymetazoline, a 45 degree rigid endoscope was placed into both nares with the patient in a sitting position.  The following was observed:      Septum: intact and deviated to the right with a bony spur posteriorly  Other:   -The inferior and middle turbinates were examined. The middle meatus, and sphenoethmoid recess was examined bilaterally.    -Swelling and erythematous changes to the nasal mucosa without evidence of obvious purulence. .   -There were no complications. Tolerated well without complication. I attest that I was present for and did the entire procedure myself. This note was generated completely or in part utilizing Dragon dictation speech recognition software. Occasionally, words are mistranscribed and despite editing, the text may contain inaccuracies due to incorrect word recognition. If further clarification is needed please contact the office at [phone]. An electronic signature was used to authenticate this note.     --Jeanie Cowan MD

## 2022-04-19 ENCOUNTER — TELEPHONE (OUTPATIENT)
Dept: INTERNAL MEDICINE CLINIC | Age: 77
End: 2022-04-19

## 2022-04-19 ENCOUNTER — APPOINTMENT (OUTPATIENT)
Dept: GENERAL RADIOLOGY | Age: 77
End: 2022-04-19
Payer: MEDICARE

## 2022-04-19 ENCOUNTER — TELEMEDICINE (OUTPATIENT)
Dept: INTERNAL MEDICINE CLINIC | Age: 77
End: 2022-04-19
Payer: MEDICARE

## 2022-04-19 ENCOUNTER — NURSE ONLY (OUTPATIENT)
Dept: INTERNAL MEDICINE CLINIC | Age: 77
End: 2022-04-19

## 2022-04-19 ENCOUNTER — HOSPITAL ENCOUNTER (EMERGENCY)
Age: 77
Discharge: HOME | End: 2022-04-19
Attending: EMERGENCY MEDICINE
Payer: MEDICARE

## 2022-04-19 VITALS
RESPIRATION RATE: 14 BRPM | TEMPERATURE: 97.8 F | HEIGHT: 67 IN | SYSTOLIC BLOOD PRESSURE: 128 MMHG | DIASTOLIC BLOOD PRESSURE: 98 MMHG | HEART RATE: 51 BPM | OXYGEN SATURATION: 97 % | WEIGHT: 153.88 LBS | BODY MASS INDEX: 24.15 KG/M2

## 2022-04-19 VITALS — DIASTOLIC BLOOD PRESSURE: 50 MMHG | SYSTOLIC BLOOD PRESSURE: 80 MMHG

## 2022-04-19 DIAGNOSIS — Z86.79 HISTORY OF HYPOTENSION: ICD-10-CM

## 2022-04-19 DIAGNOSIS — J30.2 SEASONAL ALLERGIC RHINITIS, UNSPECIFIED TRIGGER: Primary | ICD-10-CM

## 2022-04-19 DIAGNOSIS — R05.9 COUGH: Primary | ICD-10-CM

## 2022-04-19 DIAGNOSIS — R00.1 BRADYCARDIA: ICD-10-CM

## 2022-04-19 DIAGNOSIS — I95.9 HYPOTENSION, UNSPECIFIED HYPOTENSION TYPE: ICD-10-CM

## 2022-04-19 DIAGNOSIS — J06.9 UPPER RESPIRATORY TRACT INFECTION, UNSPECIFIED TYPE: ICD-10-CM

## 2022-04-19 LAB
A/G RATIO: 2 (ref 1.1–2.2)
ALBUMIN SERPL-MCNC: 4.4 G/DL (ref 3.4–5)
ALP BLD-CCNC: 103 U/L (ref 40–129)
ALT SERPL-CCNC: 10 U/L (ref 10–40)
ANION GAP SERPL CALCULATED.3IONS-SCNC: 11 MMOL/L (ref 3–16)
AST SERPL-CCNC: 15 U/L (ref 15–37)
BASOPHILS ABSOLUTE: 0 K/UL (ref 0–0.2)
BASOPHILS RELATIVE PERCENT: 0.5 %
BILIRUB SERPL-MCNC: 0.5 MG/DL (ref 0–1)
BUN BLDV-MCNC: 18 MG/DL (ref 7–20)
CALCIUM SERPL-MCNC: 10.6 MG/DL (ref 8.3–10.6)
CHLORIDE BLD-SCNC: 105 MMOL/L (ref 99–110)
CO2: 24 MMOL/L (ref 21–32)
CREAT SERPL-MCNC: 1.2 MG/DL (ref 0.6–1.2)
EKG ATRIAL RATE: 46 BPM
EKG DIAGNOSIS: NORMAL
EKG P AXIS: 57 DEGREES
EKG P-R INTERVAL: 150 MS
EKG Q-T INTERVAL: 420 MS
EKG QRS DURATION: 82 MS
EKG QTC CALCULATION (BAZETT): 367 MS
EKG R AXIS: -30 DEGREES
EKG T AXIS: -17 DEGREES
EKG VENTRICULAR RATE: 46 BPM
EOSINOPHILS ABSOLUTE: 0.2 K/UL (ref 0–0.6)
EOSINOPHILS RELATIVE PERCENT: 3.1 %
GFR AFRICAN AMERICAN: 53
GFR NON-AFRICAN AMERICAN: 43
GLUCOSE BLD-MCNC: 117 MG/DL (ref 70–99)
HCT VFR BLD CALC: 41.5 % (ref 36–48)
HEMOGLOBIN: 14.1 G/DL (ref 12–16)
LACTIC ACID, SEPSIS: 1 MMOL/L (ref 0.4–1.9)
LYMPHOCYTES ABSOLUTE: 0.7 K/UL (ref 1–5.1)
LYMPHOCYTES RELATIVE PERCENT: 9 %
MCH RBC QN AUTO: 30.2 PG (ref 26–34)
MCHC RBC AUTO-ENTMCNC: 34 G/DL (ref 31–36)
MCV RBC AUTO: 88.7 FL (ref 80–100)
MONOCYTES ABSOLUTE: 0.5 K/UL (ref 0–1.3)
MONOCYTES RELATIVE PERCENT: 6.8 %
NEUTROPHILS ABSOLUTE: 6.3 K/UL (ref 1.7–7.7)
NEUTROPHILS RELATIVE PERCENT: 80.6 %
PDW BLD-RTO: 13.9 % (ref 12.4–15.4)
PLATELET # BLD: 122 K/UL (ref 135–450)
PMV BLD AUTO: 8.2 FL (ref 5–10.5)
POTASSIUM SERPL-SCNC: 4.4 MMOL/L (ref 3.5–5.1)
RAPID INFLUENZA  B AGN: NEGATIVE
RAPID INFLUENZA A AGN: NEGATIVE
RBC # BLD: 4.69 M/UL (ref 4–5.2)
SARS-COV-2, NAAT: NOT DETECTED
SODIUM BLD-SCNC: 140 MMOL/L (ref 136–145)
TOTAL PROTEIN: 6.6 G/DL (ref 6.4–8.2)
TROPONIN: <0.01 NG/ML
WBC # BLD: 7.8 K/UL (ref 4–11)

## 2022-04-19 PROCEDURE — 99213 OFFICE O/P EST LOW 20 MIN: CPT | Performed by: NURSE PRACTITIONER

## 2022-04-19 PROCEDURE — 87635 SARS-COV-2 COVID-19 AMP PRB: CPT

## 2022-04-19 PROCEDURE — 87804 INFLUENZA ASSAY W/OPTIC: CPT

## 2022-04-19 PROCEDURE — 1123F ACP DISCUSS/DSCN MKR DOCD: CPT | Performed by: NURSE PRACTITIONER

## 2022-04-19 PROCEDURE — 36415 COLL VENOUS BLD VENIPUNCTURE: CPT

## 2022-04-19 PROCEDURE — 83605 ASSAY OF LACTIC ACID: CPT

## 2022-04-19 PROCEDURE — 1090F PRES/ABSN URINE INCON ASSESS: CPT | Performed by: NURSE PRACTITIONER

## 2022-04-19 PROCEDURE — G8399 PT W/DXA RESULTS DOCUMENT: HCPCS | Performed by: NURSE PRACTITIONER

## 2022-04-19 PROCEDURE — 2580000003 HC RX 258: Performed by: PHYSICIAN ASSISTANT

## 2022-04-19 PROCEDURE — 85025 COMPLETE CBC W/AUTO DIFF WBC: CPT

## 2022-04-19 PROCEDURE — 93010 ELECTROCARDIOGRAM REPORT: CPT | Performed by: INTERNAL MEDICINE

## 2022-04-19 PROCEDURE — 80053 COMPREHEN METABOLIC PANEL: CPT

## 2022-04-19 PROCEDURE — 71045 X-RAY EXAM CHEST 1 VIEW: CPT

## 2022-04-19 PROCEDURE — 4040F PNEUMOC VAC/ADMIN/RCVD: CPT | Performed by: NURSE PRACTITIONER

## 2022-04-19 PROCEDURE — 93005 ELECTROCARDIOGRAM TRACING: CPT | Performed by: EMERGENCY MEDICINE

## 2022-04-19 PROCEDURE — 84484 ASSAY OF TROPONIN QUANT: CPT

## 2022-04-19 PROCEDURE — G8427 DOCREV CUR MEDS BY ELIG CLIN: HCPCS | Performed by: NURSE PRACTITIONER

## 2022-04-19 PROCEDURE — 87040 BLOOD CULTURE FOR BACTERIA: CPT

## 2022-04-19 PROCEDURE — 99285 EMERGENCY DEPT VISIT HI MDM: CPT

## 2022-04-19 RX ORDER — METHYLPREDNISOLONE 4 MG/1
TABLET ORAL
Qty: 1 KIT | Refills: 0 | Status: SHIPPED | OUTPATIENT
Start: 2022-04-19 | End: 2022-04-25

## 2022-04-19 RX ORDER — 0.9 % SODIUM CHLORIDE 0.9 %
1000 INTRAVENOUS SOLUTION INTRAVENOUS ONCE
Status: COMPLETED | OUTPATIENT
Start: 2022-04-19 | End: 2022-04-19

## 2022-04-19 RX ADMIN — SODIUM CHLORIDE 1000 ML: 9 INJECTION, SOLUTION INTRAVENOUS at 13:44

## 2022-04-19 ASSESSMENT — ENCOUNTER SYMPTOMS
NAUSEA: 0
VOMITING: 0
COUGH: 1
SHORTNESS OF BREATH: 0
RHINORRHEA: 1
TROUBLE SWALLOWING: 0
SORE THROAT: 1
DIARRHEA: 0

## 2022-04-19 NOTE — ED NOTES
Discharge and education instructions reviewed. Patient verbalized understanding, teach-back successful. Patient denied questions at this time. No acute distress noted. Patient instructed to follow-up as noted - return to emergency department if symptoms worsen. Patient verbalized understanding. Discharged per EDMD with discharged instructions.        Percy Aaron RN  04/19/22 5644

## 2022-04-19 NOTE — ED PROVIDER NOTES
[address] [facility]      Pt Name: Saloni Layton  MRN: [id number]  [place] 1945  Date of evaluation: 4/19/2022  Provider: RODOLFO Victor       I have seen and evaluated this patient with my supervising physician Dr. Ivory Jones     Hypotension   cough    HISTORY [address] [place]  (Location/Symptom, Timing/Onset, Context/Setting, Quality, Duration, Modifying Factors, Severity.)   Saloni Layton is a 65 y.o. female who presents to the emergency department for hypotension and cough. Patient reports she has had a cough productive of a clear sputum for the past 3 days. Denies hemoptysis. Also reports chest congestion. Denies chest pain shortness of breath. Has had some rhinorrhea. Reports subjective fever and chills. Denies nausea, vomiting, abdominal pain. Denies sick contacts. She called her PCP today to see what medicine she should take and they had her check her blood pressure and it was 88/50 x3 so brought her to the office and then was sent to the emergency department after visit. Reports BP is normally 150/80s. Nursing Notes were reviewed and I agree.     REVIEW OF SYSTEMS    (2-9 systems for level 4, 10 or more for level 5)     Review of Systems    All other systems reviewed and are negative except as noted    PAST MEDICAL HISTORY         Diagnosis Date    Arthritis     Asthma     Cancer ([facility] [address]     Breast    Chronic kidney disease     Fibromyalgia     Hx of blood clots     Hyperlipidemia     Hypertension     Shingles        SURGICAL HISTORY           Procedure Laterality Date    ARM SURGERY Right     tendonitis    BACK SURGERY Left 2022    BLADDER SURGERY N/A 2019    lift    BREAST SURGERY      CAROTID ENDARTERECTOMY Right     COLONOSCOPY      ENDOSCOPY, COLON, DIAGNOSTIC      EYE SURGERY      HYSTERECTOMY      TONSILLECTOMY         CURRENT MEDICATIONS       Discharge Medication List as of 4/19/2022  5:10 PM      CONTINUE these medications which have NOT CHANGED    Details   methylPREDNISolone (MEDROL DOSEPACK) 4 MG tablet Take by mouth., Disp-1 kit, R-0Normal      zolpidem (AMBIEN) 10 MG tablet Take 1 tablet by mouth nightly as needed for Sleep for up to 30 days. , Disp-30 tablet, R-0Normal      methocarbamol (ROBAXIN) 750 MG tablet take 1 tablet by oral route  QHSHistorical Med      Zinc 30 MG TABS Take by mouthHistorical Med      TURMERIC CURCUMIN PO Take 2 capsules by mouth dailyHistorical Med      ALBUTEROL IN Inhale into the lungsHistorical Med      acetaminophen (TYLENOL) 325 MG tablet Take 650 mg by mouthHistorical Med      acyclovir (ZOVIRAX) 400 MG tablet Take 400 mg by mouth 2 times dailyHistorical Med      atenolol (TENORMIN) 50 MG tablet Take 50 mg by mouth nightlyHistorical Med      vitamin D3 (CHOLECALCIFEROL) 125 MCG (5000 UT) TABS tablet Take 3 tablets by mouth dailyHistorical Med      cloNIDine (CATAPRES) 0.2 MG tablet Take 0.2 mg by mouth 3 times dailyHistorical Med      docusate (COLACE, DULCOLAX) 100 MG CAPS Take 100 mg by mouth 2 times dailyHistorical Med      Echinacea 380 MG CAPS Take 2 capsules by mouthHistorical Med      esomeprazole Magnesium (NEXIUM) 20 MG PACK Take by mouthHistorical Med      felodipine (PLENDIL) 5 MG extended release tablet Take 5 mg by mouth dailyHistorical Med      gabapentin (NEURONTIN) 300 MG capsule TAKE 1 CAPSULE BY MOUTH THREE TIMES DAILYHistorical Med      hydrALAZINE (APRESOLINE) 25 MG tablet Take 25 mg by mouth 2 times dailyHistorical Med      loratadine (CLARITIN) 10 MG tablet Take 10 mg by mouth dailyHistorical Med      losartan (COZAAR) 100 MG tablet Take 100 mg by mouth dailyHistorical Med      sertraline (ZOLOFT) 100 MG tablet Take 100 mg by mouth dailyHistorical Med      simvastatin (ZOCOR) 40 MG tablet Take 40 mg by mouth nightlyHistorical Med             ALLERGIES     Alcohol, Amlodipine, Lisinopril, Omeprazole, and Tizanidine    FAMILY HISTORY           Problem Relation Age of Onset    Cerebral palsy Brother     Colon Polyps Brother     Colon Polyps Brother      Family Status   Relation Name Status    Mother      Father      Brother  Alive    Brother  Alive        SOCIAL HISTORY      reports that she quit smoking about 41 years ago. Her smoking use included cigarettes. She has a 60.00 pack-year smoking history. She has never used smokeless tobacco. She reports that she does not drink alcohol and does not use drugs. PHYSICAL EXAM    (up to 7 for level 4, 8 or more for level 5)     ED Triage Vitals [22 1231]   BP Temp Temp Source Pulse Resp SpO2 Height Weight   (!) 109/59 97.8 °F (36.6 °C) Oral (!) 44 17 97 % 5' 7\" (1.702 m) 153 lb 14.1 oz (69.8 kg)       Physical Exam  Constitutional:       General: She is not in acute distress. Appearance: Normal appearance. She is well-developed. She is not ill-appearing, toxic-appearing or diaphoretic. HENT:      Head: Normocephalic and atraumatic. Cardiovascular:      Rate and Rhythm: Normal rate and regular rhythm. Heart sounds: Normal heart sounds. Pulmonary:      Effort: Pulmonary effort is normal. No respiratory distress. Breath sounds: Normal breath sounds. No stridor. No wheezing or rhonchi. Abdominal:      General: There is no distension. Palpations: Abdomen is soft. There is no mass. Tenderness: There is no abdominal tenderness. There is no guarding or rebound. Hernia: No hernia is present. Musculoskeletal:         General: Normal range of motion. Cervical back: Normal range of motion and neck supple. Right lower leg: No edema. Left lower leg: No edema. Skin:     General: Skin is warm. Neurological:      Mental Status: She is alert. Psychiatric:         Mood and Affect: Mood normal.         Behavior: Behavior normal.         Thought Content:  Thought content normal.         Judgment: Judgment normal.         DIFFERENTIAL DIAGNOSIS   Sepsis, PNA, viral infection, COVID, influenza, other    DIAGNOSTICRESULTS         RADIOLOGY:   Non-plain film images such as CT, Ultrasound and MRI are read by the radiologist. Plain radiographic images are visualized and preliminarily interpreted by RODOLFO Bridges with the below findings:      Interpretation per the Radiologist below, if available at the time of this note:    XR CHEST PORTABLE   Final Result   Mild cardiomegaly. No acute pulmonary disease.                LABS:  Results for orders placed or performed during the hospital encounter of 04/19/22   COVID-19, Rapid    Specimen: Nasopharyngeal Swab   Result Value Ref Range    SARS-CoV-2, NAAT Not Detected Not Detected   Rapid influenza A/B antigens    Specimen: Nares   Result Value Ref Range    Rapid Influenza A Ag Negative Negative    Rapid Influenza B Ag Negative Negative   CBC with Auto Differential   Result Value Ref Range    WBC 7.8 4.0 - 11.0 K/uL    RBC 4.69 4.00 - 5.20 M/uL    Hemoglobin 14.1 12.0 - 16.0 g/dL    Hematocrit 41.5 36.0 - 48.0 %    MCV 88.7 80.0 - 100.0 fL    MCH 30.2 26.0 - 34.0 pg    MCHC 34.0 31.0 - 36.0 g/dL    RDW 13.9 12.4 - 15.4 %    Platelets 799 (L) 118 - 450 K/uL    MPV 8.2 5.0 - 10.5 fL    Neutrophils % 80.6 %    Lymphocytes % 9.0 %    Monocytes % 6.8 %    Eosinophils % 3.1 %    Basophils % 0.5 %    Neutrophils Absolute 6.3 1.7 - 7.7 K/uL    Lymphocytes Absolute 0.7 (L) 1.0 - 5.1 K/uL    Monocytes Absolute 0.5 0.0 - 1.3 K/uL    Eosinophils Absolute 0.2 0.0 - 0.6 K/uL    Basophils Absolute 0.0 0.0 - 0.2 K/uL   Comprehensive Metabolic Panel   Result Value Ref Range    Sodium 140 136 - 145 mmol/L    Potassium 4.4 3.5 - 5.1 mmol/L    Chloride 105 99 - 110 mmol/L    CO2 24 21 - 32 mmol/L    Anion Gap 11 3 - 16    Glucose 117 (H) 70 - 99 mg/dL    BUN 18 7 - 20 mg/dL    CREATININE 1.2 0.6 - 1.2 mg/dL    GFR Non- 43 (A) >60    GFR  53 (A) >60 Calcium 10.6 8.3 - 10.6 mg/dL    Total Protein 6.6 6.4 - 8.2 g/dL    Albumin 4.4 3.4 - 5.0 g/dL    Albumin/Globulin Ratio 2.0 1.1 - 2.2    Total Bilirubin 0.5 0.0 - 1.0 mg/dL    Alkaline Phosphatase 103 40 - 129 U/L    ALT 10 10 - 40 U/L    AST 15 15 - 37 U/L   Troponin   Result Value Ref Range    Troponin <0.01 <0.01 ng/mL   Lactate, Sepsis   Result Value Ref Range    Lactic Acid, Sepsis 1.0 0.4 - 1.9 mmol/L   EKG 12 Lead   Result Value Ref Range    Ventricular Rate 46 BPM    Atrial Rate 46 BPM    P-R Interval 150 ms    QRS Duration 82 ms    Q-T Interval 420 ms    QTc Calculation (Bazett) 367 ms    P Axis 57 degrees    R Axis -30 degrees    T Axis -17 degrees    Diagnosis       Sinus bradycardia  with baseline wanderNonspecific T wave abnormalityCannot rule out Septal infarct , age undeterminedAbnormal ECGNo previous ECGs availableConfirmed by Mario[address] ([id number]) on 4/19/2022 1:21:39 PM       All other labs were withinnormal range or not returned as of this dictation. EMERGENCY DEPARTMENT COURSE and DIFFERENTIAL DIAGNOSIS/MDM:   Vitals:    Vitals:    04/19/22 1600 04/19/22 1615 04/19/22 1630 04/19/22 1715   BP: (!) 144/82 (!) 154/78 (!) 141/99 (!) 128/98   Pulse: (!) 48 (!) 48 56 51   Resp: 14 14 22 14   Temp:       TempSrc:       SpO2: 94% 94% 96% 97%   Weight:       Height:           Patient was nontoxic, well appearing, afebrile with normal vital signs with the exception of hypotension 109/59 and bradycardia with rate 44. Saturating well on room air. Was given 1 L NS bolus. Influenza and COVID negative. White count normal.  Lactic normal. Metabolic panel nromal.  CXR negative. This is likely viral in nature. BP has been normal here. She is slightly bradycardic. She is on 5 BP meds. Upon reeval appears well. Believe she is appropriate for outpatient management. FU with PCP and return for worsening. PROCEDURES:  None    FINAL IMPRESSION      1. Cough    2. Bradycardia    3.  History of hypotension          DISPOSITION/PLAN   DISPOSITION Decision To Discharge 04/19/2022 05:08:46 PM      PATIENT REFERRED TO:  Liz Fischer Talsergei  [phone]    Schedule an appointment as soon as possible for a visit in 2 days  for reevaluation    [facility] Emergency Department  [address] [place] [address]  [phone]    As needed, If symptoms worsen      DISCHARGE MEDICATIONS:  Discharge Medication List as of 4/19/2022  5:10 PM          (Please note that portions of this note werecompleted with a voice recognition program.  Efforts were made to edit the dictations but occasionally words are mis-transcribed.)    Leonor Sorto, [address] [place] [address]  04/19/22 1137

## 2022-04-19 NOTE — ED NOTES
Introduce myself to the patient, identification band inplace, stretcher in the lowest position for safety, and the call light is in reach. Updated patient on Emergency Department plan of care, no additional needs at this time, will continue to monitor patient.         Yue Mills RN  04/19/22 4384

## 2022-04-19 NOTE — ED PROVIDER NOTES
Date of evaluation: 2022    ED Attending Attestation Note     CHIEF COMPLAINT     I've had a cough since Saturday and clear stuff is coming up and my BP has been low  HISTORY OF PRESENT ILLNESS  (Location/Symptom, Timing/Onset,Context/Setting, Quality, Duration, Modifying Factors, Severity). Note limiting factors. This patient was seen by the advance practice provider. I have seen and examined the patient, agree with the workup, evaluation, management and diagnosis. The care plan has been discussed. Chief Complaint   Patient presents with    Hypotension     was 88/50s at  home and low at [facility] office was sent over from [facility] office     Cough     chest congestion and cough since saturday states has been feeling tired         Pam Leal is a 65 y.o. female who presents to the emergency department secondary to concern for multiple symptoms. She states she called her PCP today who had her check her medicine and it was in the 80s/50s three times so they had her come in to office and then sent her here. No sob, no chest pain. Past medical history noted below:    has a past medical history of Arthritis, Asthma, Cancer ([address] Chronic kidney disease, Fibromyalgia, Hx of blood clots, Hyperlipidemia, Hypertension, and Shingles. Social History     Socioeconomic History    Marital status:       Spouse name: None    Number of children: None    Years of education: None    Highest education level: None   Occupational History    None   Tobacco Use    Smoking status: Former Smoker     Packs/day: 2.00     Years: 30.00     Pack years: 60.00     Types: Cigarettes     Quit date: 1980     Years since quittin.3    Smokeless tobacco: Never Used   Vaping Use    Vaping Use: Never used   Substance and Sexual Activity    Alcohol use: Never    Drug use: Never    Sexual activity: None   Other Topics Concern    None   Social History Narrative    None     Social Determinants of Health Financial Resource Strain: Low Risk     Difficulty of Paying Living Expenses: Not hard at all   Food Insecurity: No Food Insecurity    Worried About Running Out of Food in the Last Year: Never true    Bisi of Food in the Last Year: Never true   Transportation Needs:     Lack of Transportation (Medical): Not on file    Lack of Transportation (Non-Medical): Not on file   Physical Activity:     Days of Exercise per Week: Not on file    Minutes of Exercise per Session: Not on file   Stress:     Feeling of Stress : Not on file   Social Connections:     Frequency of Communication with Friends and Family: Not on file    Frequency of Social Gatherings with Friends and Family: Not on file    Attends Orthodoxy Services: Not on file    Active Member of [address] [place] [address] [employer] or Organizations: Not on file    Attends Club or Organization Meetings: Not on file    Marital Status: Not on file   Intimate Partner Violence:     Fear of Current or Ex-Partner: Not on file    Emotionally Abused: Not on file    Physically Abused: Not on file    Sexually Abused: Not on file   Housing Stability:     Unable to Pay for Housing in the Last Year: Not on file    Number of [place] in the Last Year: Not on file    Unstable Housing in the Last Year: Not on file     Aside from what is stated above denies any other symptoms or modifying factors. Nursing Notes reviewed.     Past Surgical History:   Procedure Laterality Date    ARM SURGERY Right     tendonitis    BACK SURGERY Left 2022    BLADDER SURGERY N/A 2019    lift    BREAST SURGERY      CAROTID ENDARTERECTOMY Right     COLONOSCOPY      ENDOSCOPY, COLON, DIAGNOSTIC      EYE SURGERY      HYSTERECTOMY      TONSILLECTOMY         Family History   Problem Relation Age of Onset    Cerebral palsy Brother     Colon Polyps Brother     Colon Polyps Brother        CURRENT MEDICATIONS       Discharge Medication List as of 4/19/2022  5:10 PM      CONTINUE these medications which have NOT CHANGED    Details   methylPREDNISolone (MEDROL DOSEPACK) 4 MG tablet Take by mouth., Disp-1 kit, R-0Normal      zolpidem (AMBIEN) 10 MG tablet Take 1 tablet by mouth nightly as needed for Sleep for up to 30 days. , Disp-30 tablet, R-0Normal      methocarbamol (ROBAXIN) 750 MG tablet take 1 tablet by oral route  QHSHistorical Med      Zinc 30 MG TABS Take by mouthHistorical Med      TURMERIC CURCUMIN PO Take 2 capsules by mouth dailyHistorical Med      ALBUTEROL IN Inhale into the lungsHistorical Med      acetaminophen (TYLENOL) 325 MG tablet Take 650 mg by mouthHistorical Med      acyclovir (ZOVIRAX) 400 MG tablet Take 400 mg by mouth 2 times dailyHistorical Med      atenolol (TENORMIN) 50 MG tablet Take 50 mg by mouth nightlyHistorical Med      vitamin D3 (CHOLECALCIFEROL) 125 MCG (5000 UT) TABS tablet Take 3 tablets by mouth dailyHistorical Med      cloNIDine (CATAPRES) 0.2 MG tablet Take 0.2 mg by mouth 3 times dailyHistorical Med      docusate (COLACE, DULCOLAX) 100 MG CAPS Take 100 mg by mouth 2 times dailyHistorical Med      Echinacea 380 MG CAPS Take 2 capsules by mouthHistorical Med      esomeprazole Magnesium (NEXIUM) 20 MG PACK Take by mouthHistorical Med      felodipine (PLENDIL) 5 MG extended release tablet Take 5 mg by mouth dailyHistorical Med      gabapentin (NEURONTIN) 300 MG capsule TAKE 1 CAPSULE BY MOUTH THREE TIMES DAILYHistorical Med      hydrALAZINE (APRESOLINE) 25 MG tablet Take 25 mg by mouth 2 times dailyHistorical Med      loratadine (CLARITIN) 10 MG tablet Take 10 mg by mouth dailyHistorical Med      losartan (COZAAR) 100 MG tablet Take 100 mg by mouth dailyHistorical Med      sertraline (ZOLOFT) 100 MG tablet Take 100 mg by mouth dailyHistorical Med      simvastatin (ZOCOR) 40 MG tablet Take 40 mg by mouth nightlyHistorical Med            DIAGNOSTIC RESULTS   EKG: interpreted by the Emergency Department Physician who either signs or Co-signs this chart in the absence of a cardiologist.  EKG Indication: Low blood pressure  EKG Interpretation: Rate 46 bradycardic, rhythm sinus, axis normal.  ID/QRS/QTc within normal limits. Nonspecific T wave abnormalities noted. No prior EKG available for comparison. RADIOLOGY:   Interpretation per Radiologist below, if available at the time of this note:  XR CHEST PORTABLE   Final Result   Mild cardiomegaly. No acute pulmonary disease. Patient was given the following medications:  Orders Placed This Encounter   Medications    0.9 % sodium chloride bolus       INITIAL VITALS: BP: (!) 109/59, Temp: 97.8 °F (36.6 °C), Pulse: (!) 44, Resp: 17, SpO2: 97 %   RECENT VITALS:  BP: (!) 128/98,Temp: 97.8 °F (36.6 °C), Pulse: 51, Resp: 14, SpO2: 97 %     I personally saw the patient and performed a substantive portion of the visit including all aspects of the medical decision making. My assessment reveals an overall well, age-appropriate appearing, female who is sitting up in bed. Her vitals on arrival are notable for low blood pressure though it has improved from when she was seen in the office this morning from the 80s to the low 100s. She answers questions appropriately and in complete sentences. Her lungs are clear to auscultation bilaterally. Abdomen is benign. She denies any acute symptoms and reports earlier today she was also feeling well. Reportedly on arrival she appeared mildly dehydrated with a cap refill that was approximate 3 seconds at the time of my assessment she appears she has completed the IV fluids and her cap refill is now less than 2 seconds. She is on 5 different blood pressure medications, she reports she saw her specialist last week. She has had congestion for a few days. Her infectious work-up today is negative and we discussed the results of her work-up. We discussed potential for dehydration to be contributing to her symptoms or potentially the combination of medications needing to be adjusted. She does have blood cultures pending. She expressed understanding of instructions, return precautions, and follow-up. She was discharged home in stable condition. FINAL IMPRESSION      1. Cough    2. Bradycardia    3.  History of hypotension        DISPOSITION/PLAN   DISPOSITION Decision To Discharge 04/19/2022 05:08:46 PM      PATIENT REFERRED TO:  Vince Snow  [phone]    Schedule an appointment as soon as possible for a visit in 2 days  for reevaluation    [facility]  [address] [place] [address]  [phone]    As needed, If symptoms worsen      DISCHARGE MEDICATIONS:  Discharge Medication List as of 4/19/2022  5:10 PM               (Please note that portions of this note were completed with a voice recognition program. Efforts were made to edit the dictations but occasionally words are mis-transcribed.)    Elisa Samano MD (electronically signed)  Attending Emergency Physician        Elisa Samano MD  04/19/22 [id number]

## 2022-04-19 NOTE — PROGRESS NOTES
Patient has been instructed , per Ernesto Haas to go to the ED with her low BP and with patient feeling so bad as far as URI symptoms/  Patient states that she will go to [address] [place] [address]

## 2022-04-19 NOTE — ED NOTES
ΣΑΡΑΝΤΙ, RN at bedside obtaining both sets of blood cultures.       Feliciano Batista RN  04/19/22 [id number]

## 2022-04-19 NOTE — PROGRESS NOTES
2022    TELEHEALTH EVALUATION -- Audio/Visual (During ESEKN-27 public health emergency)    HPI:    Justin Fitzgerald (:  1945) has requested an audio/video evaluation for the following concern(s):    Presents virtually for approximately 4-day history of head congestion, rhinorrhea, postnasal drainage and sore throat. She has a nonproductive cough with chest congestion. Denies any trouble swallowing, denies fever/chills. Denies nausea vomiting and diarrhea. Denies associated chest pain or shortness of breath. She denies known sick contacts. She has been using Flonase and Coricidin which has been providing some relief to her symptoms. Home blood pressure cuff is reading 89/58 for blood pressure 57 for heart rate on repeat reading 87/60 with heart rate 46. Patient denies symptoms including dizziness lightheadedness and chest pain. Review of Systems   Constitutional: Negative for chills and fever. HENT: Positive for congestion, postnasal drip, rhinorrhea and sore throat. Negative for trouble swallowing. Respiratory: Positive for cough. Negative for shortness of breath. Cardiovascular: Negative for chest pain. Gastrointestinal: Negative for diarrhea, nausea and vomiting. Neurological: Negative for dizziness and light-headedness. Prior to Visit Medications    Medication Sig Taking? Authorizing Provider   methylPREDNISolone (MEDROL DOSEPACK) 4 MG tablet Take by mouth. Yes BRANDON Schwartz   zolpidem (AMBIEN) 10 MG tablet Take 1 tablet by mouth nightly as needed for Sleep for up to 30 days.  Yes BRANDON Schwartz   methocarbamol (ROBAXIN) 750 MG tablet take 1 tablet by oral route  QHS Yes Historical Provider, MD   Zinc 30 MG TABS Take by mouth Yes Historical Provider, MD   TURMERIC CURCUMIN PO Take 2 capsules by mouth daily Yes Historical Provider, MD   ALBUTEROL IN Inhale into the lungs Yes Historical Provider, MD   acetaminophen (TYLENOL) 325 MG tablet Take 650 mg by mouth Yes Historical Provider, MD   acyclovir (ZOVIRAX) 400 MG tablet Take 400 mg by mouth 2 times daily Yes Historical Provider, MD   atenolol (TENORMIN) 50 MG tablet Take 50 mg by mouth nightly Yes Historical Provider, MD   vitamin D3 (CHOLECALCIFEROL) 125 MCG (5000 UT) TABS tablet Take 3 tablets by mouth daily Yes Historical Provider, MD   cloNIDine (CATAPRES) 0.2 MG tablet Take 0.2 mg by mouth 3 times daily Yes Historical Provider, MD   docusate (COLACE, DULCOLAX) 100 MG CAPS Take 100 mg by mouth 2 times daily Yes Historical Provider, MD   Echinacea 380 MG CAPS Take 2 capsules by mouth Yes Historical Provider, MD   esomeprazole Magnesium (NEXIUM) 20 MG PACK Take by mouth Yes Historical Provider, MD   felodipine (PLENDIL) 5 MG extended release tablet Take 5 mg by mouth daily Yes Historical Provider, MD   hydrALAZINE (APRESOLINE) 25 MG tablet Take 25 mg by mouth 2 times daily Yes Historical Provider, MD   loratadine (CLARITIN) 10 MG tablet Take 10 mg by mouth daily Yes Historical Provider, MD   losartan (COZAAR) 100 MG tablet Take 100 mg by mouth daily Yes Historical Provider, MD   sertraline (ZOLOFT) 100 MG tablet Take 100 mg by mouth daily Yes Historical Provider, MD   simvastatin (ZOCOR) 40 MG tablet Take 40 mg by mouth nightly Yes Historical Provider, MD   gabapentin (NEURONTIN) 300 MG capsule TAKE 1 CAPSULE BY MOUTH THREE TIMES DAILY  Historical Provider, MD       Social History     Tobacco Use    Smoking status: Former Smoker     Packs/day: 2.00     Years: 30.00     Pack years: 60.00     Types: Cigarettes     Quit date: 1980     Years since quittin.3    Smokeless tobacco: Never Used   Vaping Use    Vaping Use: Never used   Substance Use Topics    Alcohol use: Never    Drug use: Never        Allergies   Allergen Reactions    Alcohol Other (See Comments)    Amlodipine Other (See Comments) and Nausea Only    Lisinopril Other (See Comments)    Omeprazole Other (See Comments)     abd pain. Triggers diverticulitis  abd pain. Triggers diverticulitis      Tizanidine Other (See Comments)     Muscles jerked  Muscles jerked     ,   Past Medical History:   Diagnosis Date    Arthritis     Asthma     Cancer ([address]     Breast    Chronic kidney disease     Fibromyalgia     Hx of blood clots     Hyperlipidemia     Hypertension     Shingles        PHYSICAL EXAMINATION:  [ INSTRUCTIONS:  \"[x]\" Indicates a positive item  \"[]\" Indicates a negative item  -- DELETE ALL ITEMS NOT EXAMINED]  Vital Signs: (As obtained by patient/caregiver or practitioner observation)    Blood pressure- 89/58 Heart rate- 57   Respiratory rate- 16   Temperature- 98.6 Pulse oximetry- RITCHIE    Constitutional: [] Appears well-developed and well-nourished [] No apparent distress      [] Abnormal-   Mental status  [x] Alert and awake  [] Oriented to person/place/time []Able to follow commands      Eyes:  EOM    []  Normal  [] Abnormal-  Sclera  [x]  Normal  [] Abnormal -         Discharge []  None visible  [] Abnormal -    HENT:   [x] Normocephalic, atraumatic. [] Abnormal   [] Mouth/Throat: Mucous membranes are moist.     External Ears [] Normal  [] Abnormal-     Neck: [x] No visualized mass     Pulmonary/Chest: [x] Respiratory effort normal.  [x] No visualized signs of difficulty breathing or respiratory distress. Speaking complete sentences without difficulty.          [] Abnormal- congested cough     Musculoskeletal:   [] Normal gait with no signs of ataxia         [x] Normal range of motion of neck        [] Abnormal-       Neurological:        [x] No Facial Asymmetry (Cranial nerve 7 motor function) (limited exam to video visit)          [] No gaze palsy        [] Abnormal-         Skin:        [x] No significant exanthematous lesions or discoloration noted on facial skin         [] Abnormal-            Psychiatric:       [] Normal Affect [] No Hallucinations         [] Abnormal-     Other pertinent observable physical exam findings-     ASSESSMENT/PLAN:  1. Seasonal allergic rhinitis, unspecified trigger: Continue antihistamine and Flonase. 2. Upper respiratory tract infection, unspecified type: 4 day symptom history. Congested cough noted during visit. - methylPREDNISolone (MEDROL DOSEPACK) 4 MG tablet; Take by mouth. Dispense: 1 kit; Refill: 0   -Start Medrol Dosepak   -Continue expectorant   -Discussed with patient bacterial versus viral nature of URIs. If symptoms worsening or not improving by day 6-7 she is to let us know and antibiotic therapy will be started at that time. -ER precautions advised. 3. Hypotension, unspecified hypotension type: asymptomatic. Pt advised to come to office now for BP check. No follow-ups on file. Nelly Ham, was evaluated through a synchronous (real-time) audio-video encounter. The patient (or guardian if applicable) is aware that this is a billable service, which includes applicable co-pays. This Virtual Visit was conducted with patient's (and/or legal guardian's) consent. The visit was conducted pursuant to the emergency declaration under the [address] [place] [address], [address] [place] [address] authority and the [employer] General Act. Patient identification was verified, and a caregiver was present when appropriate. The patient was located at home in a state where the provider was licensed to provide care. Total time spent on this encounter: Not billed by time    --BRANDON Schofield on 4/19/2022 at 3:27 PM    An electronic signature was used to authenticate this note.

## 2022-04-22 ENCOUNTER — TELEPHONE (OUTPATIENT)
Dept: INTERNAL MEDICINE CLINIC | Age: 77
End: 2022-04-22

## 2022-04-22 DIAGNOSIS — R05.9 COUGH: Primary | ICD-10-CM

## 2022-04-22 RX ORDER — GUAIFENESIN AND DEXTROMETHORPHAN HYDROBROMIDE 100; 10 MG/5ML; MG/5ML
5 SOLUTION ORAL EVERY 4 HOURS PRN
Qty: 120 ML | Refills: 0 | Status: SHIPPED | OUTPATIENT
Start: 2022-04-22

## 2022-04-22 NOTE — TELEPHONE ENCOUNTER
----- Message from Isaac Romo sent at 4/22/2022  9:46 AM EDT -----  Subject: Message to Provider    QUESTIONS  Information for Provider? Patient is calling she is coughing horribly and   wants to know if a cough medication with codeine please call the patient   to advise she is wanting to schedule an ED follow up for her cough and   congestion on 4/19. [facility] [id number] [place], [phone]  ---------------------------------------------------------------------------  --------------  Lori Carrier INFO  What is the best way for the office to contact you? OK to leave message on   voicemail, OK to respond with electronic message via [employer] portal (only   for patients who have registered [employer] account)  Preferred Call Back Phone Number? [phone]  ---------------------------------------------------------------------------  --------------  SCRIPT ANSWERS  Relationship to Patient? Self  (Patient requests to see provider urgently. )? No  Do you have any questions for your primary care provider that need to be   answered prior to your appointment?  Yes

## 2022-04-22 NOTE — TELEPHONE ENCOUNTER
I prescribed a cough medication to her pharmacy. Please advice patient to follow up with Barbette Epley as soon as possible.

## 2022-04-23 LAB
BLOOD CULTURE, ROUTINE: NORMAL
CULTURE, BLOOD 2: NORMAL

## 2022-05-05 ENCOUNTER — OFFICE VISIT (OUTPATIENT)
Dept: INTERNAL MEDICINE CLINIC | Age: 77
End: 2022-05-05
Payer: MEDICARE

## 2022-05-05 VITALS
WEIGHT: 152.5 LBS | TEMPERATURE: 97.8 F | DIASTOLIC BLOOD PRESSURE: 70 MMHG | BODY MASS INDEX: 23.88 KG/M2 | SYSTOLIC BLOOD PRESSURE: 132 MMHG | HEART RATE: 50 BPM | OXYGEN SATURATION: 97 %

## 2022-05-05 DIAGNOSIS — G47.00 INSOMNIA, UNSPECIFIED TYPE: Primary | ICD-10-CM

## 2022-05-05 PROCEDURE — 1123F ACP DISCUSS/DSCN MKR DOCD: CPT | Performed by: NURSE PRACTITIONER

## 2022-05-05 PROCEDURE — 1036F TOBACCO NON-USER: CPT | Performed by: NURSE PRACTITIONER

## 2022-05-05 PROCEDURE — G8399 PT W/DXA RESULTS DOCUMENT: HCPCS | Performed by: NURSE PRACTITIONER

## 2022-05-05 PROCEDURE — G8427 DOCREV CUR MEDS BY ELIG CLIN: HCPCS | Performed by: NURSE PRACTITIONER

## 2022-05-05 PROCEDURE — 1090F PRES/ABSN URINE INCON ASSESS: CPT | Performed by: NURSE PRACTITIONER

## 2022-05-05 PROCEDURE — 99213 OFFICE O/P EST LOW 20 MIN: CPT | Performed by: NURSE PRACTITIONER

## 2022-05-05 PROCEDURE — G8420 CALC BMI NORM PARAMETERS: HCPCS | Performed by: NURSE PRACTITIONER

## 2022-05-05 PROCEDURE — 4040F PNEUMOC VAC/ADMIN/RCVD: CPT | Performed by: NURSE PRACTITIONER

## 2022-05-05 RX ORDER — LOSARTAN POTASSIUM 50 MG/1
TABLET ORAL
COMMUNITY
Start: 2022-04-12

## 2022-05-05 RX ORDER — TRAZODONE HYDROCHLORIDE 50 MG/1
50 TABLET ORAL NIGHTLY PRN
Qty: 90 TABLET | Refills: 0 | Status: SHIPPED | OUTPATIENT
Start: 2022-05-05 | End: 2022-06-07

## 2022-05-05 RX ORDER — ZOLPIDEM TARTRATE 5 MG/1
5 TABLET ORAL NIGHTLY
Qty: 21 TABLET | Refills: 0 | Status: SHIPPED | OUTPATIENT
Start: 2022-05-05 | End: 2022-05-26

## 2022-05-05 ASSESSMENT — ENCOUNTER SYMPTOMS: SHORTNESS OF BREATH: 0

## 2022-05-05 NOTE — PATIENT INSTRUCTIONS
Take lower dose of ambien for two weeks, then every other night for two weeks then stop  Start trazodone when you start every other night of ambien   Follow up 2 months

## 2022-05-05 NOTE — PROGRESS NOTES
Date: 5/5/2022                                               Subjective/Objective:     Chief Complaint   Patient presents with    Discuss Medications     Has fibroyalgia, has been taking Ambien, would like to try Trazodone instead       HPI     Diego Darden is a 66 yo female, visit today to discuss insomnia. She has been taking ambien 10 mg for over 12 years. Takes nightly. Last took over one week ago, has been feeling shaky since stopping. Has not taken other medications for insomnia previously. Has an appointment to see rheumatology for fibromyalgia in June. There are no problems to display for this patient.       Past Medical History:   Diagnosis Date    Arthritis     Asthma     Cancer ([address]     Breast    Chronic kidney disease     Fibromyalgia     Hx of blood clots     Hyperlipidemia     Hypertension     Shingles        Past Surgical History:   Procedure Laterality Date    ARM SURGERY Right     tendonitis    BACK SURGERY Left 2022    BLADDER SURGERY N/A 2019    lift    BREAST SURGERY      CAROTID ENDARTERECTOMY Right     COLONOSCOPY      ENDOSCOPY, COLON, DIAGNOSTIC      EYE SURGERY      HYSTERECTOMY      TONSILLECTOMY         Admission on 04/19/2022, Discharged on 04/19/2022   Component Date Value Ref Range Status    Ventricular Rate 04/19/2022 46  BPM Final    Atrial Rate 04/19/2022 46  BPM Final    P-R Interval 04/19/2022 150  ms Final    QRS Duration 04/19/2022 82  ms Final    Q-T Interval 04/19/2022 420  ms Final    QTc Calculation (Bazett) 04/19/2022 367  ms Final    P Axis 04/19/2022 57  degrees Final    R Axis 04/19/2022 -30  degrees Final    T Axis 04/19/2022 -17  degrees Final    Diagnosis 04/19/2022 Sinus bradycardia  with baseline wanderNonspecific T wave abnormalityCannot rule out Septal infarct , age undeterminedAbnormal ECGNo previous ECGs availableConfirmed by Jazmni Sanders ([id number]) on 4/19/2022 1:21:39 PM   Final    WBC 04/19/2022 7.8  4.0 - 11.0 K/uL Final    RBC 04/19/2022 4.69  4.00 - 5.20 M/uL Final    Hemoglobin 04/19/2022 14.1  12.0 - 16.0 g/dL Final    Hematocrit 04/19/2022 41.5  36.0 - 48.0 % Final    MCV 04/19/2022 88.7  80.0 - 100.0 fL Final    MCH 04/19/2022 30.2  26.0 - 34.0 pg Final    MCHC 04/19/2022 34.0  31.0 - 36.0 g/dL Final    RDW 04/19/2022 13.9  12.4 - 15.4 % Final    Platelets 96/89/0848 122* 135 - 450 K/uL Final    MPV 04/19/2022 8.2  5.0 - 10.5 fL Final    Neutrophils % 04/19/2022 80.6  % Final    Lymphocytes % 04/19/2022 9.0  % Final    Monocytes % 04/19/2022 6.8  % Final    Eosinophils % 04/19/2022 3.1  % Final    Basophils % 04/19/2022 0.5  % Final    Neutrophils Absolute 04/19/2022 6.3  1.7 - 7.7 K/uL Final    Lymphocytes Absolute 04/19/2022 0.7* 1.0 - 5.1 K/uL Final    Monocytes Absolute 04/19/2022 0.5  0.0 - 1.3 K/uL Final    Eosinophils Absolute 04/19/2022 0.2  0.0 - 0.6 K/uL Final    Basophils Absolute 04/19/2022 0.0  0.0 - 0.2 K/uL Final    Sodium 04/19/2022 140  136 - 145 mmol/L Final    Potassium 04/19/2022 4.4  3.5 - 5.1 mmol/L Final    Chloride 04/19/2022 105  99 - 110 mmol/L Final    CO2 04/19/2022 24  21 - 32 mmol/L Final    Anion Gap 04/19/2022 11  3 - 16 Final    Glucose 04/19/2022 117* 70 - 99 mg/dL Final    BUN 04/19/2022 18  7 - 20 mg/dL Final    CREATININE 04/19/2022 1.2  0.6 - 1.2 mg/dL Final    GFR Non- 04/19/2022 43* >60 Final    Comment: >60 mL/min/1.73m2 EGFR, calc. for ages 25 and older using the  MDRD formula (not corrected for weight), is valid for stable  renal function.  GFR  04/19/2022 53* >60 Final    Comment: Chronic Kidney Disease: less than 60 ml/min/1.73 sq.m. Kidney Failure: less than 15 ml/min/1.73 sq.m. Results valid for patients 18 years and older.       Calcium 04/19/2022 10.6  8.3 - 10.6 mg/dL Final    Total Protein 04/19/2022 6.6  6.4 - 8.2 g/dL Final    Albumin 04/19/2022 4.4  3.4 - 5.0 g/dL Final    Albumin/Globulin Ratio 04/19/2022 2.0  1.1 - 2.2 Final    Total Bilirubin 04/19/2022 0.5  0.0 - 1.0 mg/dL Final    Alkaline Phosphatase 04/19/2022 103  40 - 129 U/L Final    ALT 04/19/2022 10  10 - 40 U/L Final    AST 04/19/2022 15  15 - 37 U/L Final    Troponin 04/19/2022 <0.01  <0.01 ng/mL Final    Methodology by Troponin T    Lactic Acid, Sepsis 04/19/2022 1.0  0.4 - 1.9 mmol/L Final    SARS-CoV-2, NAAT 04/19/2022 Not Detected  Not Detected Final    Comment: Rapid NAAT:   Negative results should be treated as presumptive and,  if inconsistent with clinical signs and symptoms or necessary for  patient management, should be tested with an alternative molecular  assay. Negative results do not preclude SARS-CoV-2 infection and  should not be used as the sole basis for patient management decisions. This test has been authorized by the FDA under an Emergency Use  Authorization (EUA) for use by authorized laboratories. Fact sheet for Healthcare Providers:  BuildHer.es  Fact sheet for Patients: BuildHer.es    METHODOLOGY: Isothermal Nucleic Acid Amplification      Rapid Influenza A Ag 04/19/2022 Negative  Negative Final    Rapid Influenza B Ag 04/19/2022 Negative  Negative Final    Blood Culture, Routine 04/19/2022 No Growth after 4 days of incubation. Final    Culture, Blood 2 04/19/2022 No Growth after 4 days of incubation. Final       Family History   Problem Relation Age of Onset    Cerebral palsy Brother     Colon Polyps Brother     Colon Polyps Brother        Current Outpatient Medications   Medication Sig Dispense Refill    losartan (COZAAR) 50 MG tablet       zolpidem (AMBIEN) 5 MG tablet Take 1 tablet by mouth at bedtime for 21 days.  21 tablet 0    traZODone (DESYREL) 50 MG tablet Take 1 tablet by mouth nightly as needed for Sleep 90 tablet 0    Dextromethorphan-guaiFENesin (ALTARUSSIN DM)  MG/5ML SYRP Take 5 mLs by mouth every 4 hours as needed for Cough 120 mL 0    methocarbamol (ROBAXIN) 750 MG tablet take 1 tablet by oral route  QHS      Zinc 30 MG TABS Take by mouth      TURMERIC CURCUMIN PO Take 2 capsules by mouth daily      ALBUTEROL IN Inhale into the lungs      acetaminophen (TYLENOL) 325 MG tablet Take 650 mg by mouth      acyclovir (ZOVIRAX) 400 MG tablet Take 400 mg by mouth 2 times daily      atenolol (TENORMIN) 50 MG tablet Take 50 mg by mouth nightly      vitamin D3 (CHOLECALCIFEROL) 125 MCG (5000 UT) TABS tablet Take 3 tablets by mouth daily      cloNIDine (CATAPRES) 0.2 MG tablet Take 0.2 mg by mouth 3 times daily      docusate (COLACE, DULCOLAX) 100 MG CAPS Take 100 mg by mouth 2 times daily      Echinacea 380 MG CAPS Take 2 capsules by mouth      esomeprazole Magnesium (NEXIUM) 20 MG PACK Take by mouth      felodipine (PLENDIL) 5 MG extended release tablet Take 5 mg by mouth daily      hydrALAZINE (APRESOLINE) 25 MG tablet Take 25 mg by mouth 2 times daily      loratadine (CLARITIN) 10 MG tablet Take 10 mg by mouth daily      sertraline (ZOLOFT) 100 MG tablet Take 100 mg by mouth daily      simvastatin (ZOCOR) 40 MG tablet Take 40 mg by mouth nightly      gabapentin (NEURONTIN) 300 MG capsule TAKE 1 CAPSULE BY MOUTH THREE TIMES DAILY       No current facility-administered medications for this visit. Allergies   Allergen Reactions    Alcohol Other (See Comments)    Amlodipine Other (See Comments) and Nausea Only    Lisinopril Other (See Comments)    Omeprazole Other (See Comments)     abd pain. Triggers diverticulitis  abd pain. Triggers diverticulitis      Tizanidine Other (See Comments)     Muscles jerked  Muscles jerked         Review of Systems   Constitutional: Negative for chills and fever. Respiratory: Negative for shortness of breath. Psychiatric/Behavioral: Positive for sleep disturbance. Negative for dysphoric mood.        Vitals:  /70 (Site: Left Upper Arm, Position: Sitting, Cuff Size: Medium Adult)   Pulse 50   Temp 97.8 °F (36.6 °C) (Oral)   Wt 152 lb 8 oz (69.2 kg)   SpO2 97%   BMI 23.88 kg/m²     Physical Exam  Constitutional:       General: She is not in acute distress. HENT:      Head: Normocephalic and atraumatic. Pulmonary:      Effort: Pulmonary effort is normal.   Skin:     General: Skin is warm and dry. Neurological:      General: No focal deficit present. Mental Status: She is alert and oriented to person, place, and time. Psychiatric:         Mood and Affect: Mood normal.         Behavior: Behavior normal.         Thought Content: Thought content normal.         Judgment: Judgment normal.         Assessment/Plan     1. Insomnia, unspecified type: Patient agreeable to stopping Ambien and transitioning to trazodone. Patient last took Ambien over 1 week ago, does not like she is having withdrawal symptoms of shakiness. - zolpidem (AMBIEN) 5 MG tablet; Take 1 tablet by mouth at bedtime for 21 days. Dispense: 21 tablet; Refill: 0  - traZODone (DESYREL) 50 MG tablet; Take 1 tablet by mouth nightly as needed for Sleep  Dispense: 90 tablet; Refill: 0   -Start Ambien wean, she was taking 10 mg daily. She will take 5 mg nightly for 2 weeks, she will then decrease to every other night for 2 weeks. She will then discontinue. If she has any withdrawal symptoms she is to resume nightly dosing and follow-up. She will start trazodone same week that she starts every other night dosing of Ambien. Medication education provided. She has follow-up appointment scheduled in June. No orders of the defined types were placed in this encounter. Return in about 1 month (around 6/7/2022) for keep follow up in June. OR sooner with questions, concerns, worsening symptoms    BRANDON COBRETT  5/5/2022  2:15 PM    Discussed use, benefit, and side effects of prescribed medications. Barriers to medication compliance addressed.   Discussed all ordered testing and labs. All patient questions answered. Patient agreeable with plan above. Please note that this chart was generated using dragon dictation software. Although every effort was made to ensure the accuracy of this automated transcription, some errors in transcription may have occurred.

## 2022-05-09 ENCOUNTER — HOSPITAL ENCOUNTER (OUTPATIENT)
Dept: WOMENS IMAGING | Age: 77
Discharge: HOME | End: 2022-05-09
Payer: MEDICARE

## 2022-05-09 DIAGNOSIS — Z85.3 HISTORY OF LEFT BREAST CANCER: ICD-10-CM

## 2022-05-09 DIAGNOSIS — Z12.31 ENCOUNTER FOR SCREENING MAMMOGRAM FOR MALIGNANT NEOPLASM OF BREAST: ICD-10-CM

## 2022-05-09 PROCEDURE — 77067 SCR MAMMO BI INCL CAD: CPT

## 2022-05-18 RX ORDER — DOXYCYCLINE HYCLATE 100 MG
TABLET ORAL
Qty: 14 TABLET | Refills: 0 | OUTPATIENT
Start: 2022-05-18

## 2022-05-29 NOTE — PROGRESS NOTES
Patient: Nicole Cummins  1945, 76 y.o./female    Ambulatory to laser room. Left eye marked and numbed by Dr. Gio Brown. Laser procedure done and tolerated well by patient. Post  instructions given to [facility] by provider.       Power setting was 1.1 mJ        # of shots was 66    Total power was 60.3 mJ    Eloina Pat RN Resident/Fellow

## 2022-06-07 ENCOUNTER — OFFICE VISIT (OUTPATIENT)
Dept: INTERNAL MEDICINE CLINIC | Age: 77
End: 2022-06-07
Payer: MEDICARE

## 2022-06-07 VITALS — HEART RATE: 52 BPM | DIASTOLIC BLOOD PRESSURE: 58 MMHG | SYSTOLIC BLOOD PRESSURE: 102 MMHG

## 2022-06-07 DIAGNOSIS — Z00.00 MEDICARE ANNUAL WELLNESS VISIT, SUBSEQUENT: Primary | ICD-10-CM

## 2022-06-07 DIAGNOSIS — G47.00 INSOMNIA, UNSPECIFIED TYPE: ICD-10-CM

## 2022-06-07 PROCEDURE — 99213 OFFICE O/P EST LOW 20 MIN: CPT | Performed by: NURSE PRACTITIONER

## 2022-06-07 PROCEDURE — 1036F TOBACCO NON-USER: CPT | Performed by: NURSE PRACTITIONER

## 2022-06-07 PROCEDURE — G8399 PT W/DXA RESULTS DOCUMENT: HCPCS | Performed by: NURSE PRACTITIONER

## 2022-06-07 PROCEDURE — G8420 CALC BMI NORM PARAMETERS: HCPCS | Performed by: NURSE PRACTITIONER

## 2022-06-07 PROCEDURE — 1090F PRES/ABSN URINE INCON ASSESS: CPT | Performed by: NURSE PRACTITIONER

## 2022-06-07 PROCEDURE — G0439 PPPS, SUBSEQ VISIT: HCPCS | Performed by: NURSE PRACTITIONER

## 2022-06-07 PROCEDURE — 1123F ACP DISCUSS/DSCN MKR DOCD: CPT | Performed by: NURSE PRACTITIONER

## 2022-06-07 PROCEDURE — G8427 DOCREV CUR MEDS BY ELIG CLIN: HCPCS | Performed by: NURSE PRACTITIONER

## 2022-06-07 RX ORDER — TRAZODONE HYDROCHLORIDE 100 MG/1
100 TABLET ORAL NIGHTLY PRN
Qty: 30 TABLET | Refills: 1 | Status: SHIPPED | OUTPATIENT
Start: 2022-06-07 | End: 2022-08-03

## 2022-06-07 ASSESSMENT — PATIENT HEALTH QUESTIONNAIRE - PHQ9
8. MOVING OR SPEAKING SO SLOWLY THAT OTHER PEOPLE COULD HAVE NOTICED. OR THE OPPOSITE, BEING SO FIGETY OR RESTLESS THAT YOU HAVE BEEN MOVING AROUND A LOT MORE THAN USUAL: 0
SUM OF ALL RESPONSES TO PHQ QUESTIONS 1-9: 0
9. THOUGHTS THAT YOU WOULD BE BETTER OFF DEAD, OR OF HURTING YOURSELF: 0
1. LITTLE INTEREST OR PLEASURE IN DOING THINGS: 0
5. POOR APPETITE OR OVEREATING: 0
SUM OF ALL RESPONSES TO PHQ QUESTIONS 1-9: 0
4. FEELING TIRED OR HAVING LITTLE ENERGY: 0
10. IF YOU CHECKED OFF ANY PROBLEMS, HOW DIFFICULT HAVE THESE PROBLEMS MADE IT FOR YOU TO DO YOUR WORK, TAKE CARE OF THINGS AT HOME, OR GET ALONG WITH OTHER PEOPLE: 0
7. TROUBLE CONCENTRATING ON THINGS, SUCH AS READING THE NEWSPAPER OR WATCHING TELEVISION: 0
3. TROUBLE FALLING OR STAYING ASLEEP: 0
SUM OF ALL RESPONSES TO PHQ QUESTIONS 1-9: 0
2. FEELING DOWN, DEPRESSED OR HOPELESS: 0
6. FEELING BAD ABOUT YOURSELF - OR THAT YOU ARE A FAILURE OR HAVE LET YOURSELF OR YOUR FAMILY DOWN: 0
SUM OF ALL RESPONSES TO PHQ9 QUESTIONS 1 & 2: 0
SUM OF ALL RESPONSES TO PHQ QUESTIONS 1-9: 0

## 2022-06-07 ASSESSMENT — LIFESTYLE VARIABLES: HOW OFTEN DO YOU HAVE A DRINK CONTAINING ALCOHOL: NEVER

## 2022-06-07 NOTE — PATIENT INSTRUCTIONS
Increase Trazodone to 100 mg nightly, let me know how this is doing. Bring copy of living to will to office next visit so that we can scan into your chart    Personalized Preventive Plan for Bill Tessy - 6/7/2022  Medicare offers a range of preventive health benefits. Some of the tests and screenings are paid in full while other may be subject to a deductible, co-insurance, and/or copay. Some of these benefits include a comprehensive review of your medical history including lifestyle, illnesses that may run in your family, and various assessments and screenings as appropriate. After reviewing your medical record and screening and assessments performed today your provider may have ordered immunizations, labs, imaging, and/or referrals for you. A list of these orders (if applicable) as well as your Preventive Care list are included within your After Visit Summary for your review. Other Preventive Recommendations:    · A preventive eye exam performed by an eye specialist is recommended every 1-2 years to screen for glaucoma; cataracts, macular degeneration, and other eye disorders. · A preventive dental visit is recommended every 6 months. · Try to get at least 150 minutes of exercise per week or 10,000 steps per day on a pedometer . · Order or download the FREE \"Exercise & Physical Activity: Your Everyday Guide\" from The [employer]. Call 3-628.530.6978 or search [employer] online. · You need 6129-5984 mg of calcium and 6378-8528 IU of vitamin D per day. It is possible to meet your calcium requirement with diet alone, but a vitamin D supplement is usually necessary to meet this goal.  · When exposed to the sun, use a sunscreen that protects against both UVA and UVB radiation with an SPF of 30 or greater. Reapply every 2 to 3 hours or after sweating, drying off with a towel, or swimming. · Always wear a seat belt when traveling in a car.  Always wear a helmet when riding a bicycle or motorcycle.

## 2022-06-07 NOTE — PROGRESS NOTES
Medicare Annual Wellness Visit    Jeff Jordan is here for Medicare AWV and Insomnia (averaging about 6 hours of sleep , however the majority is light sleep)    Assessment & Plan    Medicare annual wellness visit   - Health maintenance reviewed    Insomnia, unspecified type: sleep time has decreased to 6 hours from 8-9 since stopping Ambien.  -     traZODone (DESYREL) 100 MG tablet; Take 1 tablet by mouth nightly as needed for Sleep, Disp-30 tablet, R-1Normal   -Increase trazodone to 100 mg nightly. She is to let me know how this is doing. Recommendations for Preventive Services Due: see orders and patient instructions/AVS.  Recommended screening schedule for the next 5-10 years is provided to the patient in written form: see Patient Instructions/AVS.     Return in 6 months (on 12/7/2022) for Medicare Annual Wellness Visit in 1 year. Subjective   The following acute and/or chronic problems were also addressed today:    Patient was seen for insomnia 5/5/2022. He had been managed long-term on Ambien. She was weaned of Ambien at that time and commenced on trazodone 50 mg. She did have headaches when she started the trazodone, this has resolved. She denies other medication side effects. She reports that according to her watch she is averaging 6 hours of sleep at night. With [place] had been averaging 8-9 hours. Is feeling sleepy during the day. History of left breast cancer, status post lumpectomy and radiation 2014. Breast surgeon was Dr. Lamb. She reports that she no longer follows with him and has been completing yearly mammograms routinely.     Hypertension managed on atenolol, clonidine, hydralazine, felodipine and losartan. Managed by nephrology. Denies chest pain, shortness of breath and headache.      Follows with dermatology. She is on suppressive treatment for shingles, taking acyclovir daily.     History of fibromyalgia, has appointment to establish with rheumatology this week.    Has chronic low back pain. She reports that she is followed by Lawence Rinne, she had a left lower back ablation which did offer relief.       Depression managed on Zoloft. She reports that she has been taking this for many years. She reports that her mood is doing well. She denies SI.     History of asthma managed on albuterol as needed. She reports that she only uses when the weather changes, cannot recall the last time she used albuterol.      Hyperlipidemia managed on simvastatin.      CKD, follows with nephrology-Dr Anuradha Lee    She denies medication side effects.     Colon cancer screening: colonoscopy 7/2017 with Dr Laney Garrido, 10 year follow up recommended  Gyn:               Last Mammogram: 5/2022  Osteoporosis Screening: normal bone density DEXA 12/2020    Patient's complete Health Risk Assessment and screening values have been reviewed and are found in Flowsheets. The following problems were reviewed today and where indicated follow up appointments were made and/or referrals ordered.     Positive Risk Factor Screenings with Interventions:               General Health and ACP:  General  In general, how would you say your health is?: Good  In the past 7 days, have you experienced any of the following: New or Increased Pain, New or Increased Fatigue, Loneliness, Social Isolation, Stress or Anger?: (!) Yes  Select all that apply: (!) New or Increased Pain  Do you get the social and emotional support that you need?: Yes  Do you have a Living Will?: Yes    Advance Directives     Power of  Living Will ACP-Advance Directive ACP-Power of     Not on File Not on File Not on File Not on File      General Health Risk Interventions:  · Pain issues: chronic back pain, follows with [place]      Safety:  Do you have working smoke detectors?: (!) No  Do you have any tripping hazards - loose or unsecured carpets or rugs?: (!) Yes  Do you have any tripping hazards - clutter in doorways, halls, or stairs?: No  Do you have either shower bars, grab bars, non-slip mats or non-slip surfaces in your shower or bathtub?: Yes  Do all of your stairways have a railing or banister?: Yes  Do you always fasten your seatbelt when you are in a car?: Yes    Safety Interventions:  · Home safety tips provided           Objective   Vitals:    06/07/22 0933   BP: (!) 102/58   Pulse: 52      There is no height or weight on file to calculate BMI. General Appearance: alert and oriented to person, place and time, well developed and well- nourished, in no acute distress  Skin: warm and dry, no rash or erythema on exposed areas  Head: normocephalic and atraumatic  Neck: supple and non-tender without mass, no thyromegaly or thyroid nodules, no cervical lymphadenopathy  Pulmonary/Chest: clear to auscultation bilaterally- no wheezes, rales or rhonchi, normal air movement, no respiratory distress  Cardiovascular: bradycardia, regular rhythm, normal S1 and S2, no murmurs  Abdomen: soft, non-tender, non-distended, normal bowel sounds, no masses or organomegaly  Musculoskeletal: normal range of motion  Neurologic:  gait, coordination and speech normal       Allergies   Allergen Reactions    Alcohol Other (See Comments)    Amlodipine Other (See Comments) and Nausea Only    Lisinopril Other (See Comments)    Omeprazole Other (See Comments)     abd pain. Triggers diverticulitis  abd pain. Triggers diverticulitis      Tizanidine Other (See Comments)     Muscles jerked  Muscles jerked       Prior to Visit Medications    Medication Sig Taking?  Authorizing Provider   traZODone (DESYREL) 100 MG tablet Take 1 tablet by mouth nightly as needed for Sleep Yes BRANDON Schwartz   losartan (COZAAR) 50 MG tablet  Yes Historical Provider, MD   Dextromethorphan-guaiFENesin (ALTARUSSIN DM)  MG/5ML SYRP Take 5 mLs by mouth every 4 hours as needed for Cough Yes Sanna Kelsey MD   Zinc 30 MG TABS Take by mouth Yes Historical Provider, MD TURMERIC CURCUMIN PO Take 2 capsules by mouth daily Yes Historical Provider, MD   ALBUTEROL IN Inhale into the lungs Yes Historical Provider, MD   acetaminophen (TYLENOL) 325 MG tablet Take 650 mg by mouth Yes Historical Provider, MD   acyclovir (ZOVIRAX) 400 MG tablet Take 400 mg by mouth 2 times daily Yes Historical Provider, MD   atenolol (TENORMIN) 50 MG tablet Take 50 mg by mouth nightly Yes Historical Provider, MD   vitamin D3 (CHOLECALCIFEROL) 125 MCG (5000 UT) TABS tablet Take 3 tablets by mouth daily Yes Historical Provider, MD   cloNIDine (CATAPRES) 0.2 MG tablet Take 0.2 mg by mouth 3 times daily Yes Historical Provider, MD   docusate (COLACE, DULCOLAX) 100 MG CAPS Take 100 mg by mouth 2 times daily Yes Historical Provider, MD   Echinacea 380 MG CAPS Take 2 capsules by mouth Yes Historical Provider, MD   esomeprazole Magnesium (NEXIUM) 20 MG PACK Take by mouth Yes Historical Provider, MD   felodipine (PLENDIL) 5 MG extended release tablet Take 5 mg by mouth daily Yes Historical Provider, MD   hydrALAZINE (APRESOLINE) 25 MG tablet Take 25 mg by mouth 2 times daily Yes Historical Provider, MD   loratadine (CLARITIN) 10 MG tablet Take 10 mg by mouth daily Yes Historical Provider, MD   sertraline (ZOLOFT) 100 MG tablet Take 100 mg by mouth daily Yes Historical Provider, MD   simvastatin (ZOCOR) 40 MG tablet Take 40 mg by mouth nightly Yes Historical Provider, MD   gabapentin (NEURONTIN) 300 MG capsule TAKE 1 CAPSULE BY MOUTH THREE TIMES DAILY  Historical Provider, MD Sandy (Including outside providers/suppliers regularly involved in providing care):   Patient Care Team:  BRANDON Gale as PCP - General (Nurse Practitioner)  BRANDON Gale as PCP - REHABILITATION [facility] Empaneled Provider     Reviewed and updated this visit:  Tobacco  Allergies  Meds  Problems  Med Hx  Surg Hx  Soc Hx  Fam Hx

## 2022-06-08 NOTE — PROGRESS NOTES
2022  Patient Name: Evens Holm  :   Medical Record: [id number]      ASSESSMENT AND PLAN    Assessment/Plan:      ASSESSMENT:    1. Fibromyalgia    2. Primary osteoarthritis involving multiple joints    3. Encounter for screening for other viral diseases     4. Vitamin D deficiency        PLAN:     Eleno Arroyo was seen today for new patient. Diagnoses and all orders for this visit:    Fibromyalgia  -     Sedimentation Rate; Future  -     C-Reactive Protein; Future  -     Rheumatoid Factor; Future  -     Cyclic Citrul Peptide Antibody, IgG; Future  -     Hepatitis B Core Antibody, IgM; Future  -     Hepatitis B Surface Antigen; Future  -     Hepatitis C Antibody; Future  -     TSH with Reflex to FT4; Future  -     Miscellaneous Sendout; Future    Primary osteoarthritis involving multiple joints  -     TSH with Reflex to FT4; Future  -     CK; Future  -     Vitamin B12; Future  -     Vitamin D 25 Hydroxy; Future  -     XR HAND RIGHT (MIN 3 VIEWS); Future  -     XR HAND LEFT (MIN 3 VIEWS); Future    Encounter for screening for other viral diseases   -     Hepatitis B Surface Antigen; Future    Vitamin D deficiency  -     Vitamin D 25 Hydroxy; Future       Fibromyalgia  Fibromyalgia is a non-life threatening non-rheumatic disease. Discussed diagnosis, pathophysiology and management options with the patient. I discussed various treatment options with the patient including the medical management and physical therapy/aquatic therapy as well as self exercises. Went over various FDA approved (cymbalta, lyrica, gabapentin, sevalla) and non-fda approved medications (muscle relaxants, ssri's) with the patient. Written material was provided to the patient on fibromyalgia.    Most likely primary fibromyalgia  I will do work-up to rule out metabolic, myopathy, autoimmune etiology  Continue gabapentin 300 mg 3 times a day  -Strongly emphasized on low impact aerobic and stretching exercises including biking, swimming, walking, yoga or tiachi classes  -deep breathing and relaxation exercises   -mindfulness techniques  -Counseled on Sleep hygiene   -Advised to drink 64 oz of water   -Limit caffeine intake to 8 oz/day       Primary osteoarthritis involving multiple joints-  Most likely osteoarthritis. No active synovitis on joint exam  I will do work-up to completely rule out rheumatoid arthritis  I will obtain bilateral hand x-rays  Avoid oral NSAIDs due to chronic kidney disease  Diclofenac gel as needed  Tylenol 1000 mg every 8 hours, do not exceed 3 grams daily       The patient indicates understanding of these issues and agrees with the plan. Return in about 6 weeks (around 7/21/2022). The risks and benefits of my recommendations, as well as other treatment options, benefits and side effects werediscussed with the patient. All questions were answered.     I reviewed patient's history, referral documents and electronic medical records  Copy of consult note is being routedelectronically/faxed to referring physician         MEDICATIONS  Current Outpatient Medications   Medication Sig Dispense Refill    solifenacin (VESICARE) 10 MG tablet       esomeprazole (NEXIUM) 20 MG delayed release capsule Take 1 tablet by mouth daily      traZODone (DESYREL) 100 MG tablet Take 1 tablet by mouth nightly as needed for Sleep 30 tablet 1    losartan (COZAAR) 50 MG tablet       Dextromethorphan-guaiFENesin (ALTARUSSIN DM)  MG/5ML SYRP Take 5 mLs by mouth every 4 hours as needed for Cough 120 mL 0    Zinc 30 MG TABS Take by mouth      TURMERIC CURCUMIN PO Take 2 capsules by mouth daily      ALBUTEROL IN Inhale into the lungs      acetaminophen (TYLENOL) 325 MG tablet Take 650 mg by mouth      acyclovir (ZOVIRAX) 400 MG tablet Take 400 mg by mouth 2 times daily      atenolol (TENORMIN) 50 MG tablet Take 50 mg by mouth nightly      vitamin D3 (CHOLECALCIFEROL) 125 MCG (5000 UT) TABS tablet Take 3 tablets by mouth daily      cloNIDine (CATAPRES) 0.2 MG tablet Take 0.2 mg by mouth 3 times daily      docusate (COLACE, DULCOLAX) 100 MG CAPS Take 100 mg by mouth 2 times daily      Echinacea 380 MG CAPS Take 2 capsules by mouth      felodipine (PLENDIL) 5 MG extended release tablet Take 5 mg by mouth daily      gabapentin (NEURONTIN) 300 MG capsule TAKE 1 CAPSULE BY MOUTH THREE TIMES DAILY      hydrALAZINE (APRESOLINE) 25 MG tablet Take 25 mg by mouth 2 times daily      loratadine (CLARITIN) 10 MG tablet Take 10 mg by mouth daily      sertraline (ZOLOFT) 100 MG tablet Take 100 mg by mouth daily      simvastatin (ZOCOR) 40 MG tablet Take 40 mg by mouth nightly       No current facility-administered medications for this visit. ALLERGIES  Allergies   Allergen Reactions    Alcohol Other (See Comments)    Omeprazole Other (See Comments)     abd pain. Triggers diverticulitis  abd pain. Triggers diverticulitis      Tizanidine Other (See Comments)     Muscles jerked  Muscles jerked           Comments  No specialty comments available. REFERRING PHYSICIAN:BRANDON Schwartz    HISTORY OF PRESENT ILLNESS  Pam Leal is a 65 y.o. female with past medical history of hypertension, chronic kidney disease, fibromyalgia, DVT, breast cancer, osteoarthritis, degenerative disease lumbar spine who is being seen for follow up evaluation of  fibromyalgia and osteoarthritis. She was diagnosed with fibromyalgia in 2008. She has widespread musculoskeletal pain involving upper and lower body on both sides of the midline. She has been on daily basis. She has difficulty falling and staying asleep. She has fatigue. Sleep is nonrestorative. She also has anxiety and depression. She denies brain fog and memory changes. She was seeing a rheumatologist at [address] [place] [address]. She was last seen in 2021. She is on gabapentin 300 mg 3 times a day for past several years which helps. She was also on Ambien for sleep.   She was taken off of Ambien 2 months ago and put on trazodone by her PCP. She still has difficulty falling and staying asleep. She also has concomitant osteoarthritis. She has pain in all the joints. Pain is worse in her right hip, hands. She denies any swelling in the joints. Morning stiffness for 30 minutes. She is unable to take oral NSAIDs due to chronic kidney disease. She takes Tylenol as needed and uses Voltaren gel as needed. She has received bilateral CMC joint corticosteroid injections in the past.  She denies family history of rheumatoid arthritis. She denies psoriasis, inflammatory bowel disease, and middle back pain, dactylitis, enthesitis, tenosynovitis and uveitis. She denies fever, weight loss and swollen glands. She denies any side effects with the gabapentin. She also has degenerative disc disease in the lumbar spine. She sees [facility]. She has received epidural, nerve blocks and radiofrequency ablations. HPI  Review of Systems    REVIEW OF SYSTEMS: Positive for fatigue, sleep disturbance, anxiety, depression  Constitutional: No unanticipated weight loss or fevers. Integumentary: No rash, photosensitivity, malar rash, livedo reticularis, alopecia and Raynaud's symptoms, sclerodactyly, skin tightening  Eyes: negative for visual disturbance and persistent redness, discharge from eyes   ENT: - No tinnitus, loss of hearing, vertigo, or recurrent ear infections.  - No history of nasal/oral ulcers. - No history of dry eyes/dry mouth  Cardiovascular: No history of pericarditis, chest pain or murmur or palpitations  Respiratory: No shortness of breath, cough or history of interstitial lung disease. No history of pleurisy. No history of tuberculosis or atypical infections. Gastrointestinal: No history of heart burn, dysphagia or esophageal dysmotility. No change in bowel habits or any inflammatory bowel disease. Genitourinary: No history of miscarriages.   Hematologic/Lymphatic: No abnormal bruising or bleeding,swollen lymph nodes. Neurological: No history of headaches, seizure or focal weakness. No history of neuropathies, paresthesias or hyperesthesias, facial droop, diplopia  Psychiatric: No history of bipolar disease  Endocrine: Denies any polyuria, polydipsia and osteoporosis  Allergic/Immunologic: No nasal congestion or hives. I have reviewed patients Past medical History, Social History and Family History as mentioned in her chart and this remains unchanged fromprevious. Past Medical History:   Diagnosis Date    Arthritis     Asthma     Cancer ([facility] [address]     Breast    Chronic kidney disease     Fibromyalgia     Hx of blood clots     Hyperlipidemia     Hypertension     Shingles      Past Surgical History:   Procedure Laterality Date    ARM SURGERY Right     tendonitis    BACK SURGERY Left     BLADDER SURGERY N/A 2019    lift    BREAST SURGERY      CAROTID ENDARTERECTOMY Right     COLONOSCOPY      ENDOSCOPY, COLON, DIAGNOSTIC      EYE SURGERY      HYSTERECTOMY (CERVIX STATUS UNKNOWN)      TONSILLECTOMY       Social History     Socioeconomic History    Marital status:       Spouse name: Not on file    Number of children: Not on file    Years of education: Not on file    Highest education level: Not on file   Occupational History    Not on file   Tobacco Use    Smoking status: Former Smoker     Packs/day: 2.00     Years: 30.00     Pack years: 60.00     Types: Cigarettes     Quit date: 1980     Years since quittin.4    Smokeless tobacco: Never Used   Vaping Use    Vaping Use: Never used   Substance and Sexual Activity    Alcohol use: Never    Drug use: Never    Sexual activity: Not on file   Other Topics Concern    Not on file   Social History Narrative    Not on file     Social Determinants of Health     Financial Resource Strain: Low Risk     Difficulty of Paying Living Expenses: Not hard at all   Food Insecurity: No Food Insecurity    Worried About Running Out of Food in the Last Year: Never true    Bisi of Food in the Last Year: Never true   Transportation Needs:     Lack of Transportation (Medical): Not on file    Lack of Transportation (Non-Medical):  Not on file   Physical Activity: Insufficiently Active    Days of Exercise per Week: 7 days    Minutes of Exercise per Session: 20 min   Stress:     Feeling of Stress : Not on file   Social Connections:     Frequency of Communication with Friends and Family: Not on file    Frequency of Social Gatherings with Friends and Family: Not on file    Attends Sikh Services: Not on file    Active Member of Clubs or Organizations: Not on file    Attends Club or Organization Meetings: Not on file    Marital Status: Not on file   Intimate Partner Violence:     Fear of Current or Ex-Partner: Not on file    Emotionally Abused: Not on file    Physically Abused: Not on file    Sexually Abused: Not on file   Housing Stability:     Unable to Pay for Housing in the Last Year: Not on file    Number of [place] in the Last Year: Not on file    Unstable Housing in the Last Year: Not on file     Family History   Problem Relation Age of Onset    Cerebral palsy Brother     Colon Polyps Brother     Colon Polyps Brother          PHYSICAL EXAM   Vitals:    06/09/22 0926   BP: (!) 164/92   Site: Left Upper Arm   Position: Sitting   Cuff Size: Medium Adult   Temp: 97.2 °F (36.2 °C)   TempSrc: Infrared   Weight: 150 lb 9.6 oz (68.3 kg)   Height: 5' 7\" (1.702 m)     Physical Exam  Constitutional:  Well developed, well nourished, no acute distress, non-toxic appearance   Musculoskeletal:    RIGHT  Swell  Tender  ROM  LEFT  Swell  Tender  ROM    DIP2  0  0   [place]  0  0   [place]   DIP3  0  0   [place]  0  0   [place]   DIP4  0  0   [place]  0  0   [place]   DIP5  0  0   [place]  0  0   [place]   PIP1  0  0   bony change  0  0   bony change   PIP2  0  0   bony change  0  0  04/19/2022    MCV 88.7 04/19/2022    MCH 30.2 04/19/2022    MCHC 34.0 04/19/2022    RDW 13.9 04/19/2022    LYMPHOPCT 9.0 04/19/2022    MONOPCT 6.8 04/19/2022    BASOPCT 0.5 04/19/2022    MONOSABS 0.5 04/19/2022    LYMPHSABS 0.7 04/19/2022    EOSABS 0.2 04/19/2022    BASOSABS 0.0 04/19/2022       Chemistry        Component Value Date/Time     04/19/2022 1256    K 4.4 04/19/2022 1256     04/19/2022 1256    CO2 24 04/19/2022 1256    BUN 18 04/19/2022 1256    CREATININE 1.2 04/19/2022 1256        Component Value Date/Time    CALCIUM 10.6 04/19/2022 1256    ALKPHOS 103 04/19/2022 1256    AST 15 04/19/2022 1256    ALT 10 04/19/2022 1256    BILITOT 0.5 04/19/2022 1256          No results found for: SEDRATE  No results found for: CRP  No results found for: ANGLE, SAMUEL, SSA, SSB, C3, C4  No results found for: RF, CCPABIGG  No results found for: ANGEL, ANATITER, ANAINT, PATH  No results found for: DSDNAG, DSDNAIGGIFA  No results found for: SSAROAB, SSALAAB  No results found for: SMAB, RNPAB  No results found for: CENTABIGG  No results found for: C3, C4, ACE  No results found for: JO1, VITD25, RCK96DKYLG  No results found for: Dorthey Harms  No results found for: FSMSRPOE24  No results found for: TSHFT4, TSH  No results found for: VITD25    Radiology:        ######################################################################    I thank you for giving me theopportunity to participate in [facility]. If you have any questions or concerns please feel free to contact me. I look forward to following  Deliliah Councilman along with you. Electronically signed by: Chiqui Randall MD, MD, 6/9/2022 9:51 AM    Documentation was done using voice recognition dragon software. Every effort was made to ensure accuracy;however, inadvertent unintentional computerized transcription errors may be present.

## 2022-06-09 ENCOUNTER — OFFICE VISIT (OUTPATIENT)
Dept: RHEUMATOLOGY | Age: 77
End: 2022-06-09
Payer: MEDICARE

## 2022-06-09 VITALS
DIASTOLIC BLOOD PRESSURE: 92 MMHG | HEIGHT: 67 IN | BODY MASS INDEX: 23.64 KG/M2 | SYSTOLIC BLOOD PRESSURE: 164 MMHG | WEIGHT: 150.6 LBS | TEMPERATURE: 97.2 F

## 2022-06-09 DIAGNOSIS — M15.9 PRIMARY OSTEOARTHRITIS INVOLVING MULTIPLE JOINTS: ICD-10-CM

## 2022-06-09 DIAGNOSIS — Z11.59 ENCOUNTER FOR SCREENING FOR OTHER VIRAL DISEASES: ICD-10-CM

## 2022-06-09 DIAGNOSIS — E55.9 VITAMIN D DEFICIENCY: ICD-10-CM

## 2022-06-09 DIAGNOSIS — M79.7 FIBROMYALGIA: Primary | ICD-10-CM

## 2022-06-09 PROCEDURE — G8427 DOCREV CUR MEDS BY ELIG CLIN: HCPCS | Performed by: INTERNAL MEDICINE

## 2022-06-09 PROCEDURE — 1123F ACP DISCUSS/DSCN MKR DOCD: CPT | Performed by: INTERNAL MEDICINE

## 2022-06-09 PROCEDURE — 1036F TOBACCO NON-USER: CPT | Performed by: INTERNAL MEDICINE

## 2022-06-09 PROCEDURE — 1090F PRES/ABSN URINE INCON ASSESS: CPT | Performed by: INTERNAL MEDICINE

## 2022-06-09 PROCEDURE — G8399 PT W/DXA RESULTS DOCUMENT: HCPCS | Performed by: INTERNAL MEDICINE

## 2022-06-09 PROCEDURE — G8420 CALC BMI NORM PARAMETERS: HCPCS | Performed by: INTERNAL MEDICINE

## 2022-06-09 PROCEDURE — 99204 OFFICE O/P NEW MOD 45 MIN: CPT | Performed by: INTERNAL MEDICINE

## 2022-06-09 RX ORDER — SOLIFENACIN SUCCINATE 10 MG/1
TABLET, FILM COATED ORAL
COMMUNITY
Start: 2022-06-06

## 2022-06-09 NOTE — PATIENT INSTRUCTIONS
Labs   xrays   Continue gabapentin   Tylenol 1000 mg every 8 hours, do not exceed 3 grams daily   Diclofenac gel as needed   -Strongly emphasized on low impact aerobic and stretching exercises including biking, swimming, walking, yoga or tiachi classes  -deep breathing and relaxation exercises   -mindfulness techniques  -Counseled on Sleep hygiene   -Advised to drink 64 oz of water   -Limit caffeine intake to 8 oz/day

## 2022-06-09 NOTE — LETTER
[facility]  [facility] [address] [address]  Phone: [phone]  Fax: [phone]    Carolyne Bertrand MD        June 9, 2022     Jess Velasco, APRN  [address] [facility],  [address] [place] [address]  Leticia Jang, [address] [address]    Patient: Elma Pelayo  MR Number: [id number]  YOB: 1945  Date of Visit: 6/9/2022    Dear Dr. Leticia Jang:    Thank you for your referral. Progress note attached in visit summary. If you have questions, please do not hesitate to call me. I look forward to following Ashly Kyle along with you.     Sincerely,        Carolyne Bertrand MD

## 2022-07-06 DIAGNOSIS — U07.1 COVID-19: Primary | ICD-10-CM

## 2022-07-07 ENCOUNTER — TELEPHONE (OUTPATIENT)
Dept: INTERNAL MEDICINE CLINIC | Age: 77
End: 2022-07-07

## 2022-07-07 NOTE — TELEPHONE ENCOUNTER
I spoke to patient and informed her that she is out of the 5 day window, so she can no longer take the Paxlovid. I will call the infusion center.

## 2022-07-07 NOTE — TELEPHONE ENCOUNTER
Called pharmacy, states prescription is in process at another location, they do not have paxlovid at [address] [facility]. If she did not stop her statin and start her paxlovid yesterday, she cannot take either oral medication, she is out of the 5 day treatment window. Please call pt and see if she did this. If not, call infusion center to see if any infusions available.

## 2022-07-07 NOTE — TELEPHONE ENCOUNTER
Pharmacy called wanting to switch the covid medication (to the one that starts with molu-) since there was an interaction with the other medications that the pt takes. Please call back if there is any questions.

## 2022-07-07 NOTE — TELEPHONE ENCOUNTER
I spoke to the infusion center and they are only offering the infusion if she is not out of the 7 day window. They can TRY  and do it tomorrow. They are short staffed at the moment. She said that if we fax the order over, she will try her best to get her in tomorrow.

## 2022-08-02 DIAGNOSIS — G47.00 INSOMNIA, UNSPECIFIED TYPE: ICD-10-CM

## 2022-08-02 RX ORDER — TRAZODONE HYDROCHLORIDE 50 MG/1
TABLET ORAL
Qty: 90 TABLET | OUTPATIENT
Start: 2022-08-02

## 2022-08-02 NOTE — TELEPHONE ENCOUNTER
Last office visit :06/07/2022        Future Appointments   Date Time Provider Malachi Glynn   8/12/2022  8:40 AM Gay Jack MD [facility]   12/6/2022  7:45 AM [address]

## 2022-08-03 DIAGNOSIS — G47.00 INSOMNIA, UNSPECIFIED TYPE: ICD-10-CM

## 2022-08-03 RX ORDER — TRAZODONE HYDROCHLORIDE 100 MG/1
TABLET ORAL
Qty: 30 TABLET | Refills: 2 | Status: SHIPPED | OUTPATIENT
Start: 2022-08-03 | End: 2022-10-31

## 2022-08-03 NOTE — TELEPHONE ENCOUNTER
Last office visit :06/07/2022    Future Appointments   Date Time Provider Malachi Glynn   8/12/2022  8:40 AM Blas Hughes MD [facility]   12/6/2022  7:45 AM [address]

## 2022-08-04 ENCOUNTER — PATIENT MESSAGE (OUTPATIENT)
Dept: INTERNAL MEDICINE CLINIC | Age: 77
End: 2022-08-04

## 2022-08-05 RX ORDER — GABAPENTIN 300 MG/1
300 CAPSULE ORAL 3 TIMES DAILY
Qty: 270 CAPSULE | Refills: 1 | Status: SHIPPED | OUTPATIENT
Start: 2022-08-05 | End: 2022-08-29 | Stop reason: SDUPTHER

## 2022-08-23 ENCOUNTER — OFFICE VISIT (OUTPATIENT)
Dept: INTERNAL MEDICINE CLINIC | Age: 77
End: 2022-08-23
Payer: MEDICARE

## 2022-08-23 ENCOUNTER — TELEPHONE (OUTPATIENT)
Dept: INTERNAL MEDICINE CLINIC | Age: 77
End: 2022-08-23

## 2022-08-23 VITALS
SYSTOLIC BLOOD PRESSURE: 110 MMHG | WEIGHT: 147 LBS | HEART RATE: 53 BPM | TEMPERATURE: 97.7 F | OXYGEN SATURATION: 98 % | BODY MASS INDEX: 23.02 KG/M2 | DIASTOLIC BLOOD PRESSURE: 72 MMHG

## 2022-08-23 DIAGNOSIS — R05.9 COUGH: Primary | ICD-10-CM

## 2022-08-23 PROCEDURE — 1090F PRES/ABSN URINE INCON ASSESS: CPT | Performed by: NURSE PRACTITIONER

## 2022-08-23 PROCEDURE — G8427 DOCREV CUR MEDS BY ELIG CLIN: HCPCS | Performed by: NURSE PRACTITIONER

## 2022-08-23 PROCEDURE — 1123F ACP DISCUSS/DSCN MKR DOCD: CPT | Performed by: NURSE PRACTITIONER

## 2022-08-23 PROCEDURE — 1036F TOBACCO NON-USER: CPT | Performed by: NURSE PRACTITIONER

## 2022-08-23 PROCEDURE — G8420 CALC BMI NORM PARAMETERS: HCPCS | Performed by: NURSE PRACTITIONER

## 2022-08-23 PROCEDURE — G8399 PT W/DXA RESULTS DOCUMENT: HCPCS | Performed by: NURSE PRACTITIONER

## 2022-08-23 PROCEDURE — 99214 OFFICE O/P EST MOD 30 MIN: CPT | Performed by: NURSE PRACTITIONER

## 2022-08-23 RX ORDER — CLONIDINE HYDROCHLORIDE 0.1 MG/1
0.1 TABLET ORAL 2 TIMES DAILY
COMMUNITY

## 2022-08-23 RX ORDER — PREDNISONE 10 MG/1
TABLET ORAL
Qty: 30 TABLET | Refills: 0 | Status: SHIPPED | OUTPATIENT
Start: 2022-08-23 | End: 2022-09-02

## 2022-08-23 RX ORDER — AMOXICILLIN AND CLAVULANATE POTASSIUM 875; 125 MG/1; MG/1
1 TABLET, FILM COATED ORAL 2 TIMES DAILY
Qty: 14 TABLET | Refills: 0 | Status: SHIPPED | OUTPATIENT
Start: 2022-08-23 | End: 2022-08-30

## 2022-08-23 ASSESSMENT — ENCOUNTER SYMPTOMS
WHEEZING: 1
VOMITING: 0
RHINORRHEA: 0
SHORTNESS OF BREATH: 1
DIARRHEA: 0
COUGH: 1
NAUSEA: 0

## 2022-08-23 NOTE — TELEPHONE ENCOUNTER
Pharmacy called wanting to know if it was okay to get the pre mix of the medication albuterol (PROVENTIL) (5 MG/ML) 0.5% nebulizer solution

## 2022-08-23 NOTE — PROGRESS NOTES
<5.0 mg/L Final    Comment: Results are traditional acute phase CRP. For cardiac risk levels, order high sensitivity   CRP. Tested at [facility] [place] [address]      TSH 08/02/2022 1.570  0.270 - 4.200 mcIU/mL Final    Comment: (NOTE)  Ingestion of juan doses of biotin (>5 mg/day) taken within 8 hours of  drawing blood sample can interfere with this immunoassay test.      Vit D, 25-Hydroxy 08/02/2022 54.9  30.0 - 150.0 ng/mL Final    Comment: (NOTE)  Preferred: >= 30 ng/mL  Insufficient: 21-29 ng/mL  Deficient <= 20  ng/mL  Possible Toxicity: >150 ng/mL    Samples should not be taken from patients receiving therapy with high  biotin doses (i.e. > 5 mg/day) until at least 8 hours following the  last biotin administration. Vitamin B-12 08/02/2022 299  232 - 1245 pg/mL Final    Comment: (NOTE)  Ingestion of juan doses of biotin (>5 mg/day) taken within 8 hours of  drawing blood sample can interfere with this immunoassay test.      Hepatitis B Surface Ag 08/02/2022 NONREACTIVE  NONREACTIVE Final    Hep B Core Ab, IgM 08/02/2022 NONREACTIVE  NONREACTIVE Final    Rheumatoid Factor 08/02/2022 23 (A) <14 IU/mL Final    CC Peptide,IgG Ab 08/02/2022 <0.5  <5.0 U/mL Final    Comment: (NOTE)  Negative:  < 5.0 U/mL    Positive:  > or = 5.0 U/mL      ANGEL Ab, IgG JANAE 08/02/2022 NEGATIVE  NEGATIVE Final    Comment: (NOTE)  ANGEL samples are screened using automated enzyme immunoassay. All  samples that screen positive are then tested by the indirect  immunofluorescence method.       Hepatitis C virus RNA Ser/Plas NCnc 08/02/2022 NONREACTIVE  NONREACTIVE Final       Family History   Problem Relation Age of Onset    Cerebral palsy Brother     Colon Polyps Brother     Colon Polyps Brother        Current Outpatient Medications   Medication Sig Dispense Refill    cloNIDine (CATAPRES) 0.1 MG tablet Take 0.1 mg by mouth 2 times daily      predniSONE (DELTASONE) 10 MG tablet Take 4 tablets by mouth daily for 4 days, THEN 3 tablets daily for 3 days, THEN 2 tablets daily for 2 days, THEN 1 tablet daily for 1 day. 30 tablet 0    amoxicillin-clavulanate (AUGMENTIN) 875-125 MG per tablet Take 1 tablet by mouth 2 times daily for 7 days 14 tablet 0    albuterol (PROVENTIL) (5 MG/ML) 0.5% nebulizer solution Take 0.5 mLs by nebulization every 6 hours as needed for Wheezing 120 each 3    gabapentin (NEURONTIN) 300 MG capsule Take 1 capsule by mouth in the morning and 1 capsule at noon and 1 capsule before bedtime. Do all this for 331 days.  270 capsule 1    traZODone (DESYREL) 100 MG tablet TAKE ONE TABLET BY MOUTH ONCE NIGHTLY AS NEEDED FOR SLEEP 30 tablet 2    solifenacin (VESICARE) 10 MG tablet       esomeprazole (NEXIUM) 20 MG delayed release capsule Take 1 tablet by mouth daily      losartan (COZAAR) 50 MG tablet       Dextromethorphan-guaiFENesin (ALTARUSSIN DM)  MG/5ML SYRP Take 5 mLs by mouth every 4 hours as needed for Cough 120 mL 0    Zinc 30 MG TABS Take by mouth      TURMERIC CURCUMIN PO Take 2 capsules by mouth daily      ALBUTEROL IN Inhale into the lungs      acetaminophen (TYLENOL) 325 MG tablet Take 650 mg by mouth      acyclovir (ZOVIRAX) 400 MG tablet Take 400 mg by mouth 2 times daily      atenolol (TENORMIN) 50 MG tablet Take 50 mg by mouth in the morning and at bedtime      vitamin D3 (CHOLECALCIFEROL) 125 MCG (5000 UT) TABS tablet Take 3 tablets by mouth daily      docusate (COLACE, DULCOLAX) 100 MG CAPS Take 100 mg by mouth 2 times daily      Echinacea 380 MG CAPS Take 2 capsules by mouth      felodipine (PLENDIL) 5 MG extended release tablet Take 5 mg by mouth daily      hydrALAZINE (APRESOLINE) 25 MG tablet Take 25 mg by mouth 2 times daily      loratadine (CLARITIN) 10 MG tablet Take 10 mg by mouth daily      sertraline (ZOLOFT) 100 MG tablet Take 100 mg by mouth daily      simvastatin (ZOCOR) 40 MG tablet Take 40 mg by mouth nightly       No current facility-administered medications for this visit. Allergies   Allergen Reactions    Alcohol Other (See Comments)    Omeprazole Other (See Comments)     abd pain. Triggers diverticulitis  abd pain. Triggers diverticulitis      Tizanidine Other (See Comments)     Muscles jerked  Muscles jerked         Review of Systems   Constitutional:  Negative for chills and fever. HENT:  Negative for congestion and rhinorrhea. Respiratory:  Positive for cough, shortness of breath and wheezing. Cardiovascular:  Negative for chest pain. Gastrointestinal:  Negative for diarrhea, nausea and vomiting. Vitals:  /72 (Site: Left Upper Arm, Position: Sitting, Cuff Size: Medium Adult)   Pulse 53   Temp 97.7 °F (36.5 °C) (Oral)   Wt 147 lb (66.7 kg)   SpO2 98%   BMI 23.02 kg/m²     Physical Exam  Vitals reviewed. Constitutional:       General: She is not in acute distress. HENT:      Head: Normocephalic and atraumatic. Cardiovascular:      Rate and Rhythm: Normal rate and regular rhythm. Heart sounds: Normal heart sounds. Pulmonary:      Effort: Pulmonary effort is normal. No respiratory distress. Breath sounds: Normal breath sounds. No wheezing. Comments: Dry, harsh cough  Skin:     General: Skin is warm and dry. Neurological:      General: No focal deficit present. Mental Status: She is alert and oriented to person, place, and time. Psychiatric:         Mood and Affect: Mood normal.         Behavior: Behavior normal.         Thought Content: Thought content normal.         Judgment: Judgment normal.       Assessment/Plan     1. Cough: lungs CTA. Dry, harsh cough noted. Former smoker with new productive cough, cover with augmentin. Suspect asthma exacerbation, treat with prednisone. Differential includes post viral cough with recent illnesses. Continue PRN albuterol. Given duration of symptoms will obtain CXR. ER/follow up precautions advised. - predniSONE (DELTASONE) 10 MG tablet;  Take 4 tablets by mouth daily for 4 days, THEN 3 tablets daily for 3 days, THEN 2 tablets daily for 2 days, THEN 1 tablet daily for 1 day. Dispense: 30 tablet; Refill: 0  - amoxicillin-clavulanate (AUGMENTIN) 875-125 MG per tablet; Take 1 tablet by mouth 2 times daily for 7 days  Dispense: 14 tablet; Refill: 0  - XR CHEST STANDARD (2 VW); Future  - albuterol (PROVENTIL) (5 MG/ML) 0.5% nebulizer solution; Take 0.5 mLs by nebulization every 6 hours as needed for Wheezing  Dispense: 120 each; Refill: 3    Orders Placed This Encounter   Procedures    XR CHEST STANDARD (2 VW)     Standing Status:   Future     Standing Expiration Date:   8/23/2023     Order Specific Question:   Reason for exam:     Answer:   cough > 6 weeks         Return if symptoms worsen or fail to improve. OR sooner with questions, concerns, worsening symptoms    BRANDON CORBETT  8/23/2022  2:44 PM    Discussed use, benefit, and side effects of prescribed medications. Barriers to medication compliance addressed. Discussed all ordered testing and labs. All patient questions answered. Patient agreeable with plan above. Please note that this chart was generated using dragon dictation software. Although every effort was made to ensure the accuracy of this automated transcription, some errors in transcription may have occurred.

## 2022-08-24 ENCOUNTER — HOSPITAL ENCOUNTER (OUTPATIENT)
Age: 77
Discharge: HOME | End: 2022-08-24
Payer: MEDICARE

## 2022-08-24 ENCOUNTER — HOSPITAL ENCOUNTER (OUTPATIENT)
Dept: GENERAL RADIOLOGY | Age: 77
Discharge: HOME | End: 2022-08-24
Payer: MEDICARE

## 2022-08-24 DIAGNOSIS — R05.9 COUGH: ICD-10-CM

## 2022-08-24 PROCEDURE — 71046 X-RAY EXAM CHEST 2 VIEWS: CPT

## 2022-08-29 ENCOUNTER — OFFICE VISIT (OUTPATIENT)
Dept: RHEUMATOLOGY | Age: 77
End: 2022-08-29
Payer: MEDICARE

## 2022-08-29 VITALS
OXYGEN SATURATION: 96 % | BODY MASS INDEX: 22.71 KG/M2 | SYSTOLIC BLOOD PRESSURE: 122 MMHG | HEART RATE: 53 BPM | WEIGHT: 145 LBS | DIASTOLIC BLOOD PRESSURE: 74 MMHG

## 2022-08-29 DIAGNOSIS — M51.36 DDD (DEGENERATIVE DISC DISEASE), LUMBAR: ICD-10-CM

## 2022-08-29 DIAGNOSIS — M79.7 FIBROMYALGIA: Primary | ICD-10-CM

## 2022-08-29 DIAGNOSIS — M15.9 PRIMARY OSTEOARTHRITIS INVOLVING MULTIPLE JOINTS: ICD-10-CM

## 2022-08-29 PROCEDURE — G8399 PT W/DXA RESULTS DOCUMENT: HCPCS | Performed by: INTERNAL MEDICINE

## 2022-08-29 PROCEDURE — G8420 CALC BMI NORM PARAMETERS: HCPCS | Performed by: INTERNAL MEDICINE

## 2022-08-29 PROCEDURE — 1123F ACP DISCUSS/DSCN MKR DOCD: CPT | Performed by: INTERNAL MEDICINE

## 2022-08-29 PROCEDURE — 99214 OFFICE O/P EST MOD 30 MIN: CPT | Performed by: INTERNAL MEDICINE

## 2022-08-29 PROCEDURE — G8427 DOCREV CUR MEDS BY ELIG CLIN: HCPCS | Performed by: INTERNAL MEDICINE

## 2022-08-29 PROCEDURE — 1090F PRES/ABSN URINE INCON ASSESS: CPT | Performed by: INTERNAL MEDICINE

## 2022-08-29 PROCEDURE — 1036F TOBACCO NON-USER: CPT | Performed by: INTERNAL MEDICINE

## 2022-08-29 RX ORDER — GABAPENTIN 400 MG/1
400 CAPSULE ORAL 3 TIMES DAILY
Qty: 270 CAPSULE | Refills: 1 | Status: SHIPPED | OUTPATIENT
Start: 2022-08-29 | End: 2023-07-26

## 2022-08-29 NOTE — PROGRESS NOTES
2022  Patient Name: Kim Galan  : 2111  Medical Record: [id number]      ASSESSMENT AND PLAN    Assessment/Plan:      ASSESSMENT:    1. Fibromyalgia    2. Primary osteoarthritis involving multiple joints    3. DDD (degenerative disc disease), lumbar        PLAN:     Carmen Sen was seen today for follow-up. Diagnoses and all orders for this visit:    Fibromyalgia  -     gabapentin (NEURONTIN) 400 MG capsule; Take 1 capsule by mouth 3 times daily for 331 days. Primary osteoarthritis involving multiple joints    DDD (degenerative disc disease), lumbar  -     gabapentin (NEURONTIN) 400 MG capsule; Take 1 capsule by mouth 3 times daily for 331 days. Fibromyalgia  Fibromyalgia is a non-life threatening non-rheumatic disease. Discussed diagnosis, pathophysiology and management options with the patient. I discussed various treatment options with the patient including the medical management and physical therapy/aquatic therapy as well as self exercises. Went over various FDA approved (cymbalta, lyrica, gabapentin, sevalla) and non-fda approved medications (muscle relaxants, ssri's) with the patient. Written material was provided to the patient on fibromyalgia. Most likely primary fibromyalgia  CK, TSH, B12, vitamin D is normal.  Normal ESR and CRP. Continues to be symptomatic. I will increase gabapentin to 400 mg 3 times a day. -Strongly emphasized on low impact aerobic and stretching exercises including biking, swimming, walking, yoga or tiachi classes  -deep breathing and relaxation exercises   -mindfulness techniques  -Counseled on Sleep hygiene   -Advised to drink 64 oz of water   -Limit caffeine intake to 8 oz/day       Primary osteoarthritis involving multiple joints-  Most likely osteoarthritis. No active synovitis on joint exam  Borderline elevated RF [23, anti-CCP negative, normal ESR and CRP. Hand x-rays with multilevel degenerative changes without any erosions.   Doubt rheumatoid arthritis or systemic rheumatic disease. Elevated RF can be seen in 4% of young, healthy individuals and 3-25% in older population. Avoid oral NSAIDs due to chronic kidney disease  Diclofenac gel as needed  Tylenol 1000 mg every 8 hours, do not exceed 3 grams daily     Degenerative disc disease lumbar spine.-Right-sided radiculopathy, right SI joint and hip pain. MRI of the right hip pending. Follows [facility]. Advised to continue follow-up and use diclofenac gel as needed. Increase gabapentin to 400 mg 3 times a day. The patient indicates understanding of these issues and agrees with the plan. Return in about 3 months (around 11/29/2022). The risks and benefits of my recommendations, as well as other treatment options, benefits and side effects werediscussed with the patient. All questions were answered. MEDICATIONS  Current Outpatient Medications   Medication Sig Dispense Refill    gabapentin (NEURONTIN) 400 MG capsule Take 1 capsule by mouth 3 times daily for 331 days. 270 capsule 1    cloNIDine (CATAPRES) 0.1 MG tablet Take 0.1 mg by mouth 2 times daily      predniSONE (DELTASONE) 10 MG tablet Take 4 tablets by mouth daily for 4 days, THEN 3 tablets daily for 3 days, THEN 2 tablets daily for 2 days, THEN 1 tablet daily for 1 day.  30 tablet 0    amoxicillin-clavulanate (AUGMENTIN) 875-125 MG per tablet Take 1 tablet by mouth 2 times daily for 7 days 14 tablet 0    albuterol (PROVENTIL) (5 MG/ML) 0.5% nebulizer solution Take 0.5 mLs by nebulization every 6 hours as needed for Wheezing 120 each 3    traZODone (DESYREL) 100 MG tablet TAKE ONE TABLET BY MOUTH ONCE NIGHTLY AS NEEDED FOR SLEEP 30 tablet 2    solifenacin (VESICARE) 10 MG tablet       esomeprazole (NEXIUM) 20 MG delayed release capsule Take 1 tablet by mouth daily      losartan (COZAAR) 50 MG tablet       Dextromethorphan-guaiFENesin (ALTARUSSIN DM)  MG/5ML SYRP Take 5 mLs by mouth every 4 hours as needed for Cough 120 mL 0    Zinc 30 MG TABS Take by mouth      TURMERIC CURCUMIN PO Take 2 capsules by mouth daily      ALBUTEROL IN Inhale into the lungs      acetaminophen (TYLENOL) 325 MG tablet Take 650 mg by mouth      acyclovir (ZOVIRAX) 400 MG tablet Take 400 mg by mouth 2 times daily      atenolol (TENORMIN) 50 MG tablet Take 50 mg by mouth in the morning and at bedtime      vitamin D3 (CHOLECALCIFEROL) 125 MCG (5000 UT) TABS tablet Take 3 tablets by mouth daily      docusate (COLACE, DULCOLAX) 100 MG CAPS Take 100 mg by mouth 2 times daily      Echinacea 380 MG CAPS Take 2 capsules by mouth      felodipine (PLENDIL) 5 MG extended release tablet Take 5 mg by mouth daily      hydrALAZINE (APRESOLINE) 25 MG tablet Take 25 mg by mouth 2 times daily      loratadine (CLARITIN) 10 MG tablet Take 10 mg by mouth daily      sertraline (ZOLOFT) 100 MG tablet Take 100 mg by mouth daily      simvastatin (ZOCOR) 40 MG tablet Take 40 mg by mouth nightly       No current facility-administered medications for this visit. ALLERGIES  Allergies   Allergen Reactions    Alcohol Other (See Comments)    Omeprazole Other (See Comments)     abd pain. Triggers diverticulitis  abd pain. Triggers diverticulitis      Tizanidine Other (See Comments)     Muscles jerked  Muscles jerked           Comments  No specialty comments available. Yola Avila is a 65 y.o. female with past medical history of hypertension, chronic kidney disease, fibromyalgia, DVT, breast cancer, osteoarthritis, degenerative disease lumbar spine who is being seen for follow up evaluation of  fibromyalgia and osteoarthritis. She was diagnosed with fibromyalgia in 2008. She has widespread musculoskeletal pain involving upper and lower body on both sides of the midline. She has been on daily basis. She has difficulty falling and staying asleep. She has fatigue. Sleep is nonrestorative. She also has anxiety and depression.   She denies brain fog and memory changes. She was seeing a rheumatologist at [address] [place] [address]. She was last seen in 2021. She is on gabapentin 300 mg 3 times a day for past several years which helps. She was also on Ambien for sleep. She was taken off of Ambien 2 months ago and put on trazodone by her PCP. She still has difficulty falling and staying asleep. She also has concomitant osteoarthritis. She has pain in all the joints. Pain is worse in her right hip, hands. She denies any swelling in the joints. Morning stiffness for 30 minutes. She is unable to take oral NSAIDs due to chronic kidney disease. She takes Tylenol as needed and uses Voltaren gel as needed. She has received bilateral CMC joint corticosteroid injections in the past.  She denies family history of rheumatoid arthritis. She denies psoriasis, inflammatory bowel disease, and middle back pain, dactylitis, enthesitis, tenosynovitis and uveitis. She denies fever, weight loss and swollen glands. She denies any side effects with the gabapentin. She also has degenerative disc disease in the lumbar spine. She sees [facility]. She has received epidural, nerve blocks and radiofrequency ablations. Background rheum history: History of fibromyalgia, osteoarthritis, degenerative disease lumbar spine. RF 23, CCP negative, ANGEL negative, normal ESR and CRP. Status post bilateral CMC joint corticosteroid injections. Other significant medical history of hypertension, chronic kidney disease, DVT, breast cancer  Current Rheum medications gabapentin 300 mg 3 times a day, diclofenac gel as needed trazodone 100 mg at bedtime    Interim history: She presents for follow-up of fibromyalgia, osteoarthritis and degenerative disc disease lumbar spine. She has right lower back and hip pain which radiates to the right lower extremity. She denies tingling or numbness, weakness, bowel or bladder dysfunction or saddle anesthesia.   She has degenerative disease in the lumbar spine.  She has received epidural spinal actions and nerve blocks and radiofrequency ablations in the past.  She follows [facility]. She had MRI of the right hip, results are pending. She also gets intermittent achiness around the shoulders. Otherwise fibromyalgia is stable. She is on gabapentin 300 mg 3 times a day. She also takes trazodone 100 mg at bedtime. She has concomitant osteoarthritis. She uses diclofenac gel as needed. She is unable to take oral NSAIDs due to chronic kidney disease. She denies any other joint pain, swelling and stiffness. She had bilateral CMC joint corticosteroid injections in the past.  Blood work showed low titer positive RF 23, CCP negative with normal ESR and CRP. ANGEL is negative. Hand x-rays show multifocal degenerative changes severe in bilateral CMC joints. She denies any side effects with gabapentin. HPI  Review of Systems    REVIEW OF SYSTEMS: Positive for fatigue, sleep disturbance, anxiety, depression  Constitutional: No unanticipated weight loss or fevers. Integumentary: No rash, photosensitivity, malar rash, livedo reticularis, alopecia and Raynaud's symptoms, sclerodactyly, skin tightening  Eyes: negative for visual disturbance and persistent redness, discharge from eyes   ENT: - No tinnitus, loss of hearing, vertigo, or recurrent ear infections.  - No history of nasal/oral ulcers. - No history of dry eyes/dry mouth  Cardiovascular: No history of pericarditis, chest pain or murmur or palpitations  Respiratory: No shortness of breath, cough or history of interstitial lung disease. No history of pleurisy. No history of tuberculosis or atypical infections. Gastrointestinal: No history of heart burn, dysphagia or esophageal dysmotility. No change in bowel habits or any inflammatory bowel disease. Genitourinary: No history of miscarriages. Hematologic/Lymphatic: No abnormal bruising or bleeding,swollen lymph nodes.   Neurological: No history of headaches, seizure or focal weakness. No history of neuropathies, paresthesias or hyperesthesias, facial droop, diplopia  Psychiatric: No history of bipolar disease  Endocrine: Denies any polyuria, polydipsia and osteoporosis  Allergic/Immunologic: No nasal congestion or hives. I have reviewed patients Past medical History, Social History and Family History as mentioned in her chart and this remains unchanged fromprevious. Past Medical History:   Diagnosis Date    Arthritis     Asthma     Cancer ([address]     Breast    Chronic kidney disease     Fibromyalgia     Hx of blood clots     Hyperlipidemia     Hypertension     Shingles      Past Surgical History:   Procedure Laterality Date    ARM SURGERY Right     tendonitis    BACK SURGERY Left     BLADDER SURGERY N/A 2019    lift    BREAST SURGERY      CAROTID ENDARTERECTOMY Right     COLONOSCOPY      ENDOSCOPY, COLON, DIAGNOSTIC      EYE SURGERY      HYSTERECTOMY (CERVIX STATUS UNKNOWN)      TONSILLECTOMY       Social History     Socioeconomic History    Marital status:       Spouse name: Not on file    Number of children: Not on file    Years of education: Not on file    Highest education level: Not on file   Occupational History    Not on file   Tobacco Use    Smoking status: Former     Packs/day: 2.00     Years: 30.00     Pack years: 60.00     Types: Cigarettes     Quit date: 1980     Years since quittin.7    Smokeless tobacco: Never   Vaping Use    Vaping Use: Never used   Substance and Sexual Activity    Alcohol use: Never    Drug use: Never    Sexual activity: Not on file   Other Topics Concern    Not on file   Social History Narrative    Not on file     Social Determinants of Health     Financial Resource Strain: Low Risk     Difficulty of Paying Living Expenses: Not hard at all   Food Insecurity: No Food Insecurity    Worried About [address] in the Last Year: Never true    [facility] in the Last Year: Never true Transportation Needs: Not on file   Physical Activity: Insufficiently Active    Days of Exercise per Week: 7 days    Minutes of Exercise per Session: 20 min   Stress: Not on file   Social Connections: Not on file   Intimate Partner Violence: Not on file   Housing Stability: Not on file     Family History   Problem Relation Age of Onset    Cerebral palsy Brother     Colon Polyps Brother     Colon Polyps Brother          PHYSICAL EXAM   Vitals:    08/29/22 1242   BP: 122/74   Site: Left Upper Arm   Position: Sitting   Cuff Size: Medium Adult   Pulse: 53   SpO2: 96%   Weight: 145 lb (65.8 kg)     Physical Exam  Constitutional:  Well developed, well nourished, no acute distress, non-toxic appearance   Musculoskeletal:    RIGHT  Swell  Tender  ROM  LEFT  Swell  Tender  ROM    DIP2  0  0   Heberden  0  0   Heberden   DIP3  0  0   Heberden  0  0   Heberden   DIP4  0  0   Heberden  0  0   Heberden   DIP5  0  0   Heberden  0  0   Heberden   PIP1  0  0   bony change  0  0   bony change   PIP2  0  0   bony change  0  0   bony change   PIP3  0  0   bony change  0  0   bony change   PIP4  0  0   bony change  0  0   bony change   PIP5  0  0   bony change  0  0   bony change   MCP1  0  0   bony change  0  0   bony change   MCP2  0  0  FULL   0  0  FULL    MCP3  0  0  FULL   0  0  FULL    MCP4  0  0  FULL   0  0  FULL    MCP5  0  0  FULL   0  0  FULL    Wrist  0  0  FULL   0  0  FULL    Elbow  0  0  FULL   0  0  FULL    Shouldr  0  0  FULL   0  0  FULL    Hip  0  0  FULL   0  0  FULL    Knee  0  0   crepitus/varus  0  0   crepitus/varus   Ankle  0  0  FULL   0  0  FULL    MTP1  0  0  FULL   0  0  FULL    MTP2  0  0  FULL   0  0  FULL    MTP3  0  0  FULL   0  0  FULL    MTP4  0  0  FULL   0  0  FULL    MTP5  0  0  FULL   0  0  FULL    IP1  0  0  FULL   0  0  FULL    IP2  0  0  FULL   0  0  FULL    IP3  0  0  FULL   0  0  FULL    IP4  0  0  FULL   0  0  FULL    IP5  0  0  FULL   0 0 FULL     Squaring, bony enlargement and tenderness of bilateral CMC joints  Ambulates without assistance, normal gait  Neck: Full ROM, no tenderness,supple   Back-diffuse lumbar spinal and paraspinal tenderness +, bilateral SLR negative. Eyes:  PERRL, extra ocular movements intact, conjunctiva normal   HEENT:  Atraumatic, normocephalic, external ears normal, oropharynx moist, no pharyngeal exudates. Respiratory:  No respiratory distress  GI:  Soft, nondistended, normal bowel sounds, nontender, noorganomegaly, no mass, no rebound, no guarding   :  No costovertebral angle tenderness   Integument:  Well hydrated, no rash or telangiectasias  Lymphatic:  No lymphadenopathy noted   Neurologic:   Alert & oriented x 3, CN 2-12 normal, no focal deficits noted. Sensations Intact. Muscle strength 5/5 proximally and distally in upper and lower extremities.    Psychiatric:  Speech and behavior appropriate           LABS AND IMAGING  Outside data reviewed and in HPI    Lab Results   Component Value Date/Time    WBC 7.8 04/19/2022 12:56 PM    RBC 4.69 04/19/2022 12:56 PM    HGB 14.1 04/19/2022 12:56 PM    HCT 41.5 04/19/2022 12:56 PM     04/19/2022 12:56 PM    MCV 88.7 04/19/2022 12:56 PM    MCH 30.2 04/19/2022 12:56 PM    MCHC 34.0 04/19/2022 12:56 PM    RDW 13.9 04/19/2022 12:56 PM    LYMPHOPCT 9.0 04/19/2022 12:56 PM    MONOPCT 6.8 04/19/2022 12:56 PM    BASOPCT 0.5 04/19/2022 12:56 PM    MONOSABS 0.5 04/19/2022 12:56 PM    LYMPHSABS 0.7 04/19/2022 12:56 PM    EOSABS 0.2 04/19/2022 12:56 PM    BASOSABS 0.0 04/19/2022 12:56 PM       Chemistry        Component Value Date/Time     04/19/2022 1256    K 4.4 04/19/2022 1256     04/19/2022 1256    CO2 24 04/19/2022 1256    BUN 18 04/19/2022 1256    CREATININE 1.2 04/19/2022 1256        Component Value Date/Time    CALCIUM 10.6 04/19/2022 1256    ALKPHOS 103 04/19/2022 1256    AST 15 04/19/2022 1256    ALT 10 04/19/2022 1256    BILITOT 0.5 04/19/2022 1256          Lab Results   Component Value Date SEDRATE 8 08/02/2022     No results found for: CRP  No results found for: ANGEL, SAMUEL, SSA, SSB, C3, C4  Lab Results   Component Value Date/Time    RF 23 08/02/2022 09:31 AM     No results found for: ANGEL, ANATITER, ANAINT, PATH  No results found for: DSDNAG, DSDNAIGGIFA  No results found for: SSAROAB, SSALAAB  No results found for: SMAB, RNPAB  No results found for: CENTABIGG  No results found for: C3, C4, ACE  Lab Results   Component Value Date/Time    VITD25 54.9 08/02/2022 09:31 AM     No results found for: Marval Bears Results   Component Value Date    LRLQAELI99 299 08/02/2022     Lab Results   Component Value Date    TSH 1.570 08/02/2022     Lab Results   Component Value Date    VITD25 54.9 08/02/2022       Radiology:    Bilateral hand x-rays 8/2/2022     FINDINGS:                    BONES:  Severe osteoporosis. No acute displaced fracture or aggressive osseous lesion   JOINTS:  Severe narrowing of the radiocarpal joint as well as the trapezium-first metacarpal    joint with spurs and subchondral cysts. Narrowing of the metacarpal phalangeal joints is    moderate. Severe narrowing of the PIP and DIP joints. SOFT TISSUES:  Unremarkable   OTHER:  None           IMPRESSION:           Severe osteoporosis with severe narrowing of the PIP and DIP joints. No visible erosions   Severe osteoarthritis of the lateral wrist joints       ######################################################################    I thank you for giving me theopportunity to participate in [facility] care. If you have any questions or concerns please feel free to contact me. I look forward to following  [facility] along with you. Electronically signed by: Araceli Hartman MD, MD, 8/29/2022 1:13 PM    Documentation was done using voice recognition dragon software. Every effort was made to ensure accuracy;however, inadvertent unintentional computerized transcription errors may be present.

## 2022-08-29 NOTE — PATIENT INSTRUCTIONS
Increase gabapentin to 400 mg three times a day   Continue diclofenac gel as needed   Tylenol as needed

## 2022-08-30 DIAGNOSIS — J45.20 MILD INTERMITTENT ASTHMA WITHOUT COMPLICATION: Primary | ICD-10-CM

## 2022-08-30 RX ORDER — ALBUTEROL SULFATE 2.5 MG/3ML
2.5 SOLUTION RESPIRATORY (INHALATION) EVERY 6 HOURS PRN
Qty: 120 EACH | Refills: 3 | Status: SHIPPED | OUTPATIENT
Start: 2022-08-30

## 2022-09-13 ENCOUNTER — PATIENT MESSAGE (OUTPATIENT)
Dept: INTERNAL MEDICINE CLINIC | Age: 77
End: 2022-09-13

## 2022-09-14 NOTE — TELEPHONE ENCOUNTER
No shortness of breath or trouble breathing nor chest pain . She has an appointment tomorrow at 7:45am . I did advise patient if things change from now until then and things get worse to go to the emergency .

## 2022-09-14 NOTE — TELEPHONE ENCOUNTER
Please call patient, needs to be seen for new ankle swelling. Please call her to schedule OV. If she is having any chest pain or trouble breathing, needs to go to ER.

## 2022-09-14 NOTE — TELEPHONE ENCOUNTER
From: Gilma Tucker  To: Tatiana Mccain  Sent: 9/13/2022 9:12 PM EDT  Subject: Problems    My feet and ankles are really swollen.  I have also messaged Dr Terrance Powell

## 2022-09-15 ENCOUNTER — OFFICE VISIT (OUTPATIENT)
Dept: INTERNAL MEDICINE CLINIC | Age: 77
End: 2022-09-15
Payer: MEDICARE

## 2022-09-15 ENCOUNTER — HOSPITAL ENCOUNTER (OUTPATIENT)
Dept: VASCULAR LAB | Age: 77
Discharge: HOME | End: 2022-09-15
Payer: MEDICARE

## 2022-09-15 VITALS
OXYGEN SATURATION: 97 % | TEMPERATURE: 97.6 F | DIASTOLIC BLOOD PRESSURE: 80 MMHG | HEART RATE: 48 BPM | SYSTOLIC BLOOD PRESSURE: 144 MMHG

## 2022-09-15 DIAGNOSIS — Z23 FLU VACCINE NEED: ICD-10-CM

## 2022-09-15 DIAGNOSIS — R60.0 BILATERAL LEG EDEMA: Primary | ICD-10-CM

## 2022-09-15 DIAGNOSIS — R13.10 DYSPHAGIA, UNSPECIFIED TYPE: ICD-10-CM

## 2022-09-15 DIAGNOSIS — R60.0 BILATERAL LEG EDEMA: ICD-10-CM

## 2022-09-15 DIAGNOSIS — K21.9 GASTROESOPHAGEAL REFLUX DISEASE, UNSPECIFIED WHETHER ESOPHAGITIS PRESENT: ICD-10-CM

## 2022-09-15 LAB
A/G RATIO: 2.7 (ref 1.1–2.2)
ALBUMIN SERPL-MCNC: 4.1 G/DL (ref 3.4–5)
ALP BLD-CCNC: 74 U/L (ref 40–129)
ALT SERPL-CCNC: 16 U/L (ref 10–40)
ANION GAP SERPL CALCULATED.3IONS-SCNC: 9 MMOL/L (ref 3–16)
AST SERPL-CCNC: 20 U/L (ref 15–37)
BILIRUB SERPL-MCNC: 0.6 MG/DL (ref 0–1)
BUN BLDV-MCNC: 15 MG/DL (ref 7–20)
CALCIUM SERPL-MCNC: 10.2 MG/DL (ref 8.3–10.6)
CHLORIDE BLD-SCNC: 105 MMOL/L (ref 99–110)
CO2: 25 MMOL/L (ref 21–32)
CREAT SERPL-MCNC: 1.1 MG/DL (ref 0.6–1.2)
GFR AFRICAN AMERICAN: 58
GFR NON-AFRICAN AMERICAN: 48
GLUCOSE BLD-MCNC: 112 MG/DL (ref 70–99)
POTASSIUM SERPL-SCNC: 4 MMOL/L (ref 3.5–5.1)
SODIUM BLD-SCNC: 139 MMOL/L (ref 136–145)
TOTAL PROTEIN: 5.6 G/DL (ref 6.4–8.2)

## 2022-09-15 PROCEDURE — G8399 PT W/DXA RESULTS DOCUMENT: HCPCS | Performed by: NURSE PRACTITIONER

## 2022-09-15 PROCEDURE — 99214 OFFICE O/P EST MOD 30 MIN: CPT | Performed by: NURSE PRACTITIONER

## 2022-09-15 PROCEDURE — G8420 CALC BMI NORM PARAMETERS: HCPCS | Performed by: NURSE PRACTITIONER

## 2022-09-15 PROCEDURE — 93970 EXTREMITY STUDY: CPT

## 2022-09-15 PROCEDURE — 36415 COLL VENOUS BLD VENIPUNCTURE: CPT | Performed by: NURSE PRACTITIONER

## 2022-09-15 PROCEDURE — G8427 DOCREV CUR MEDS BY ELIG CLIN: HCPCS | Performed by: NURSE PRACTITIONER

## 2022-09-15 PROCEDURE — 90694 VACC AIIV4 NO PRSRV 0.5ML IM: CPT | Performed by: NURSE PRACTITIONER

## 2022-09-15 PROCEDURE — 1036F TOBACCO NON-USER: CPT | Performed by: NURSE PRACTITIONER

## 2022-09-15 PROCEDURE — 1090F PRES/ABSN URINE INCON ASSESS: CPT | Performed by: NURSE PRACTITIONER

## 2022-09-15 PROCEDURE — 1123F ACP DISCUSS/DSCN MKR DOCD: CPT | Performed by: NURSE PRACTITIONER

## 2022-09-15 PROCEDURE — G0008 ADMIN INFLUENZA VIRUS VAC: HCPCS | Performed by: NURSE PRACTITIONER

## 2022-09-15 RX ORDER — FUROSEMIDE 20 MG/1
20 TABLET ORAL DAILY
Qty: 30 TABLET | Refills: 0 | Status: SHIPPED | OUTPATIENT
Start: 2022-09-15 | End: 2022-10-11

## 2022-09-15 RX ORDER — PANTOPRAZOLE SODIUM 20 MG/1
20 TABLET, DELAYED RELEASE ORAL
Qty: 90 TABLET | Refills: 0 | Status: SHIPPED | OUTPATIENT
Start: 2022-09-15

## 2022-09-15 ASSESSMENT — ENCOUNTER SYMPTOMS
VOMITING: 0
TROUBLE SWALLOWING: 1
COUGH: 0
SHORTNESS OF BREATH: 0
ABDOMINAL PAIN: 0
NAUSEA: 0

## 2022-09-15 NOTE — PROGRESS NOTES
Date: 9/15/2022                                               Subjective/Objective:     Chief Complaint   Patient presents with    Edema     Lower legs and ankles have been swelling       HPI    Filiberto Canseco is a 66 yo female, visit today for leg swelling. This began about 6 weeks ago. This has been gradually worsening since onset. She has had intermittent swelling in the past, nothing like this. Swelling is from knees to feet bilaterally. Swelling improves with elevation, does not resolve completely. No chest pain or SOB. No redness or warmth. No recent travel. She reports h/o of DVT when she was 20 yo. No swelling any where else. No recent travel. Has been taking esomeprazole for 5 years which has well controlled her GERD. Over the last 2-3 months no matter what she eats she has heart burn in the afternoons. She does note some dysphagia over last 6 months, denies any choking. No nausea/vomiting, no unintentional weight loss. She reports prior EGD with Dr Hilario Fritz, over 5 years ago. No abdominal pain. There are no problems to display for this patient.       Past Medical History:   Diagnosis Date    Arthritis     Asthma     Cancer ([address]     Breast    Chronic kidney disease     Fibromyalgia     Hx of blood clots     Hyperlipidemia     Hypertension     Shingles        Past Surgical History:   Procedure Laterality Date    ARM SURGERY Right     tendonitis    BACK SURGERY Left 2022    BLADDER SURGERY N/A 2019    lift    BREAST SURGERY      CAROTID ENDARTERECTOMY Right     COLONOSCOPY      ENDOSCOPY, COLON, DIAGNOSTIC      EYE SURGERY      HYSTERECTOMY (CERVIX STATUS UNKNOWN)      TONSILLECTOMY         Orders Only on 08/02/2022   Component Date Value Ref Range Status    Sed Rate 08/02/2022 8  0 - 30 mm/hr Final    Tested at [address] [id number]    Creatine Kinase Isoenzymes, Ser/Pl* 08/02/2022 30  30 - 233 IU/L Final    Tested at [facility] [place] [address] [id number]    wr-CRP 08/02/2022 <5.00  <5.0 mg/L Final    Comment: Results are traditional acute phase CRP. For cardiac risk levels, order high sensitivity   CRP. Tested at [facility] [id number]      TSH 08/02/2022 1.570  0.270 - 4.200 mcIU/mL Final    Comment: (NOTE)  Ingestion of juan doses of biotin (>5 mg/day) taken within 8 hours of  drawing blood sample can interfere with this immunoassay test.      Vit D, 25-Hydroxy 08/02/2022 54.9  30.0 - 150.0 ng/mL Final    Comment: (NOTE)  Preferred: >= 30 ng/mL  Insufficient: 21-29 ng/mL  Deficient <= 20  ng/mL  Possible Toxicity: >150 ng/mL    Samples should not be taken from patients receiving therapy with high  biotin doses (i.e. > 5 mg/day) until at least 8 hours following the  last biotin administration. Vitamin B-12 08/02/2022 299  232 - 1245 pg/mL Final    Comment: (NOTE)  Ingestion of juan doses of biotin (>5 mg/day) taken within 8 hours of  drawing blood sample can interfere with this immunoassay test.      Hepatitis B Surface Ag 08/02/2022 NONREACTIVE  NONREACTIVE Final    Hep B Core Ab, IgM 08/02/2022 NONREACTIVE  NONREACTIVE Final    Rheumatoid Factor 08/02/2022 23 (A) <14 IU/mL Final    CC Peptide,IgG Ab 08/02/2022 <0.5  <5.0 U/mL Final    Comment: (NOTE)  Negative:  < 5.0 U/mL    Positive:  > or = 5.0 U/mL      ANGEL Ab, IgG JANAE 08/02/2022 NEGATIVE  NEGATIVE Final    Comment: (NOTE)  ANGEL samples are screened using automated enzyme immunoassay. All  samples that screen positive are then tested by the indirect  immunofluorescence method.       Hepatitis C virus RNA Ser/Plas NCnc 08/02/2022 NONREACTIVE  NONREACTIVE Final       Family History   Problem Relation Age of Onset    Cerebral palsy Brother     Colon Polyps Brother     Colon Polyps Brother        Current Outpatient Medications   Medication Sig Dispense Refill    pantoprazole (PROTONIX) 20 MG tablet Take 1 tablet by mouth every morning (before breakfast) 90 tablet 0    furosemide (LASIX) 20 MG tablet Take 1 tablet by mouth daily 30 tablet 0    albuterol (PROVENTIL) (2.5 MG/3ML) 0.083% nebulizer solution Take 3 mLs by nebulization every 6 hours as needed for Wheezing 120 each 3    gabapentin (NEURONTIN) 400 MG capsule Take 1 capsule by mouth 3 times daily for 331 days. 270 capsule 1    cloNIDine (CATAPRES) 0.1 MG tablet Take 0.1 mg by mouth 2 times daily      traZODone (DESYREL) 100 MG tablet TAKE ONE TABLET BY MOUTH ONCE NIGHTLY AS NEEDED FOR SLEEP 30 tablet 2    solifenacin (VESICARE) 10 MG tablet       losartan (COZAAR) 50 MG tablet       Dextromethorphan-guaiFENesin (ALTARUSSIN DM)  MG/5ML SYRP Take 5 mLs by mouth every 4 hours as needed for Cough 120 mL 0    Zinc 30 MG TABS Take by mouth      TURMERIC CURCUMIN PO Take 2 capsules by mouth daily      ALBUTEROL IN Inhale into the lungs      acetaminophen (TYLENOL) 325 MG tablet Take 650 mg by mouth      acyclovir (ZOVIRAX) 400 MG tablet Take 400 mg by mouth 2 times daily      atenolol (TENORMIN) 50 MG tablet Take 50 mg by mouth in the morning and at bedtime      vitamin D3 (CHOLECALCIFEROL) 125 MCG (5000 UT) TABS tablet Take 3 tablets by mouth daily      docusate (COLACE, DULCOLAX) 100 MG CAPS Take 100 mg by mouth 2 times daily      Echinacea 380 MG CAPS Take 2 capsules by mouth      felodipine (PLENDIL) 5 MG extended release tablet Take 5 mg by mouth daily      hydrALAZINE (APRESOLINE) 25 MG tablet Take 25 mg by mouth 2 times daily      loratadine (CLARITIN) 10 MG tablet Take 10 mg by mouth daily      sertraline (ZOLOFT) 100 MG tablet Take 100 mg by mouth daily      simvastatin (ZOCOR) 40 MG tablet Take 40 mg by mouth nightly       No current facility-administered medications for this visit. Allergies   Allergen Reactions    Alcohol Other (See Comments)    Omeprazole Other (See Comments)     abd pain. Triggers diverticulitis  abd pain.  Triggers diverticulitis      Tizanidine Other (See Comments)     Muscles jerked  Muscles jerked         Review of Systems   Constitutional:  Negative for chills and fever. HENT:  Positive for trouble swallowing. Respiratory:  Negative for cough and shortness of breath. Cardiovascular:  Positive for leg swelling. Negative for chest pain. Gastrointestinal:  Negative for abdominal pain, nausea and vomiting. Dyspepsia   Neurological:  Negative for dizziness. Vitals:  BP (!) 144/80 (Site: Left Upper Arm, Position: Sitting, Cuff Size: Medium Adult)   Pulse (!) 48   Temp 97.6 °F (36.4 °C) (Oral)   SpO2 97%     Physical Exam  Vitals reviewed. Constitutional:       General: She is not in acute distress. HENT:      Head: Normocephalic and atraumatic. Cardiovascular:      Rate and Rhythm: Normal rate and regular rhythm. Pulses: Normal pulses. Heart sounds: Normal heart sounds. No murmur heard. Pulmonary:      Effort: Pulmonary effort is normal. No respiratory distress. Breath sounds: Normal breath sounds. No wheezing. Abdominal:      General: Bowel sounds are normal. There is no distension. Palpations: Abdomen is soft. Tenderness: There is no abdominal tenderness. Musculoskeletal:      Right lower leg: Edema present. Left lower leg: Edema present. Comments: BLE +2 pitting edema    Skin:     General: Skin is warm and dry. Findings: No erythema. Neurological:      General: No focal deficit present. Mental Status: She is alert and oriented to person, place, and time. Psychiatric:         Mood and Affect: Mood normal.         Behavior: Behavior normal.         Thought Content: Thought content normal.         Judgment: Judgment normal.       Assessment/Plan     1. Bilateral leg edema: new onset, gradually worsening over several weeks without associated chest pain/SOB. - Comprehensive Metabolic Panel; Future  - VL DUP LOWER EXTREMITY VENOUS BILATERAL; Future  - furosemide (LASIX) 20 MG tablet;  Take 1 tablet by mouth daily  Dispense: 30 tablet; Refill: 0  - ECHO 2D WO Color Doppler Complete; Future   -She has prior history of DVT, will obtain lower extremity duplex   -Trial Lasix, medication education provided. She will need repeat BMP in 1 week at follow-up   -Medicare denying BNP   -Obtain echocardiogram   -ER precautions advised    2. Gastroesophageal reflux disease, unspecified whether esophagitis present: Long-term treatment with Nexium which she is now failing. She has associated dysphagia. - pantoprazole (PROTONIX) 20 MG tablet; Take 1 tablet by mouth every morning (before breakfast)  Dispense: 90 tablet; Refill: 0  - DALJIT Michaud MD, Gastroenterology, [facility]   -[facility] Nexium, start Protonix. Medication education provided. With associated dysphagia and worsening of GERD symptoms she needs to see GI. 3. Dysphagia, unspecified type: With associated worsening of GERD. Denies choking.  - pantoprazole (PROTONIX) 20 MG tablet; Take 1 tablet by mouth every morning (before breakfast)  Dispense: 90 tablet; Refill: 0  - DALJIT Michaud MD, Gastroenterology, [facility]   -Stop Nexium and start Protonix, medication education provided.   Needs to see GI.     4. Flu vaccine need  - Influenza, FLUAD, (age 72 y+), IM, Preservative Free, 0.5 mL    Orders Placed This Encounter   Procedures    VL DUP LOWER EXTREMITY VENOUS BILATERAL     Standing Status:   Future     Standing Expiration Date:   9/15/2023     Order Specific Question:   Reason for exam:     Answer:   bilateral LE edema with h/o DVT    Influenza, FLUAD, (age 72 y+), IM, Preservative Free, 0.5 mL    Comprehensive Metabolic Panel     Standing Status:   Future     Number of Occurrences:   1     Standing Expiration Date:   9/15/2023    DALJIT Michaud MD, Gastroenterology, [facility]     Referral Priority:   Routine     Referral Type:   Eval and Treat     Referral Reason:   Specialty Services Required Referred to Provider:   Neha De La Garza MD     Requested Specialty:   Gastroenterology     Number of Visits Requested:   1    ECHO 2D WO Color Doppler Complete     Standing Status:   Future     Standing Expiration Date:   9/15/2023     Order Specific Question:   Reason for exam:     Answer:   BLE edema, r/o CHF       Return in about 1 week (around 9/22/2022), or if symptoms worsen or fail to improve. OR sooner with questions, concerns, worsening symptoms    BRANDON CORBETT  9/15/2022  8:34 AM    Discussed use, benefit, and side effects of prescribed medications. Barriers to medication compliance addressed. Discussed all ordered testing and labs. All patient questions answered. Patient agreeable with plan above. Please note that this chart was generated using dragon dictation software. Although every effort was made to ensure the accuracy of this automated transcription, some errors in transcription may have occurred.

## 2022-09-15 NOTE — PATIENT INSTRUCTIONS
Start lasix daily   Follow up one week, sooner if worsening or not improving  Go to ER for any chest pain or SOB  Elevate your legs  Limit salt intake  Stop nexium and start protonix, take on empty stomach at least 30 min before other food/medication  See GI doctor

## 2022-10-11 DIAGNOSIS — R60.0 BILATERAL LEG EDEMA: ICD-10-CM

## 2022-10-11 RX ORDER — FUROSEMIDE 20 MG/1
TABLET ORAL
Qty: 30 TABLET | Refills: 1 | Status: SHIPPED | OUTPATIENT
Start: 2022-10-11

## 2022-10-11 NOTE — TELEPHONE ENCOUNTER
Last office visit 9/15/22        Future Appointments   Date Time Provider Malachi Gretta   11/28/2022  1:40 PM Anna Reeder MD [facility]   12/6/2022  7:45 AM Minesh Ontiveros, [address]

## 2022-10-12 ENCOUNTER — PATIENT MESSAGE (OUTPATIENT)
Dept: INTERNAL MEDICINE CLINIC | Age: 77
End: 2022-10-12

## 2022-10-12 DIAGNOSIS — R60.0 BILATERAL LEG EDEMA: Primary | ICD-10-CM

## 2022-10-12 NOTE — TELEPHONE ENCOUNTER
From: Thomas Luo  To: Asher Medina  Sent: 10/12/2022 9:39 AM EDT  Subject: Refill Furosemide 20 mg    Am I to continue taking subject drug. My ankles are much better with no swelling and very little pain. There are no refills.

## 2022-10-12 NOTE — TELEPHONE ENCOUNTER
Am I to continue taking subject drug. My ankles are much better with no swelling and very little pain. There are no refills.     Medication : Furosemide

## 2022-10-17 ENCOUNTER — OFFICE VISIT (OUTPATIENT)
Dept: INTERNAL MEDICINE CLINIC | Age: 77
End: 2022-10-17
Payer: MEDICARE

## 2022-10-17 VITALS
WEIGHT: 151.38 LBS | HEART RATE: 44 BPM | TEMPERATURE: 97.5 F | DIASTOLIC BLOOD PRESSURE: 64 MMHG | SYSTOLIC BLOOD PRESSURE: 112 MMHG | OXYGEN SATURATION: 95 % | BODY MASS INDEX: 23.71 KG/M2

## 2022-10-17 DIAGNOSIS — K21.9 GASTROESOPHAGEAL REFLUX DISEASE, UNSPECIFIED WHETHER ESOPHAGITIS PRESENT: ICD-10-CM

## 2022-10-17 DIAGNOSIS — R13.10 DYSPHAGIA, UNSPECIFIED TYPE: ICD-10-CM

## 2022-10-17 DIAGNOSIS — R60.0 BILATERAL LEG EDEMA: Primary | ICD-10-CM

## 2022-10-17 LAB
ANION GAP SERPL CALCULATED.3IONS-SCNC: 10 MMOL/L (ref 3–16)
BUN BLDV-MCNC: 17 MG/DL (ref 7–20)
CALCIUM SERPL-MCNC: 10.3 MG/DL (ref 8.3–10.6)
CHLORIDE BLD-SCNC: 102 MMOL/L (ref 99–110)
CO2: 29 MMOL/L (ref 21–32)
CREAT SERPL-MCNC: 1.2 MG/DL (ref 0.6–1.2)
GFR AFRICAN AMERICAN: 53
GFR NON-AFRICAN AMERICAN: 43
GLUCOSE BLD-MCNC: 107 MG/DL (ref 70–99)
POTASSIUM SERPL-SCNC: 4.4 MMOL/L (ref 3.5–5.1)
SODIUM BLD-SCNC: 141 MMOL/L (ref 136–145)

## 2022-10-17 PROCEDURE — 99214 OFFICE O/P EST MOD 30 MIN: CPT | Performed by: NURSE PRACTITIONER

## 2022-10-17 PROCEDURE — G8399 PT W/DXA RESULTS DOCUMENT: HCPCS | Performed by: NURSE PRACTITIONER

## 2022-10-17 PROCEDURE — G8484 FLU IMMUNIZE NO ADMIN: HCPCS | Performed by: NURSE PRACTITIONER

## 2022-10-17 PROCEDURE — 1036F TOBACCO NON-USER: CPT | Performed by: NURSE PRACTITIONER

## 2022-10-17 PROCEDURE — 1090F PRES/ABSN URINE INCON ASSESS: CPT | Performed by: NURSE PRACTITIONER

## 2022-10-17 PROCEDURE — 1123F ACP DISCUSS/DSCN MKR DOCD: CPT | Performed by: NURSE PRACTITIONER

## 2022-10-17 PROCEDURE — 36415 COLL VENOUS BLD VENIPUNCTURE: CPT | Performed by: NURSE PRACTITIONER

## 2022-10-17 PROCEDURE — G8420 CALC BMI NORM PARAMETERS: HCPCS | Performed by: NURSE PRACTITIONER

## 2022-10-17 PROCEDURE — G8427 DOCREV CUR MEDS BY ELIG CLIN: HCPCS | Performed by: NURSE PRACTITIONER

## 2022-10-17 ASSESSMENT — ENCOUNTER SYMPTOMS: SHORTNESS OF BREATH: 0

## 2022-10-17 NOTE — PROGRESS NOTES
Date: 10/17/2022                                               Subjective/Objective:     Chief Complaint   Patient presents with    Joint Swelling     Ankles hurt, but swelling is better       HPI    Rafy Ramey is a 64 yo female, visit today for follow up BLE edema. She was seen 9/15/2022 with complaints for BLE edema for 6 weeks. It had been gradually worsening. Was improving with elevation. No associated chest pain, SOB. BLE duplex negative for DVT. Trial of lasix 20 mg daily resolved BLE edema. Edema has resolved with taking lasix. She continues with some pain/tenderness in her ankles, reports this has improved since swelling has improved. She had complained of worsening GERD symptoms at last OV, associated with dysphagia, no choking. She had been taking esomeprazole for 5 years. She was switched to pantoprazole and referred to GI. She reports symptoms have resolved with taking pantoprazole. Has not seen GI. There are no problems to display for this patient.       Past Medical History:   Diagnosis Date    Arthritis     Asthma     Cancer ([address]     Breast    Chronic kidney disease     Fibromyalgia     Hx of blood clots     Hyperlipidemia     Hypertension     Shingles        Past Surgical History:   Procedure Laterality Date    ARM SURGERY Right     tendonitis    BACK SURGERY Left 2022    BLADDER SURGERY N/A 2019    lift    BREAST SURGERY      CAROTID ENDARTERECTOMY Right     COLONOSCOPY      ENDOSCOPY, COLON, DIAGNOSTIC      EYE SURGERY      HYSTERECTOMY (CERVIX STATUS UNKNOWN)      TONSILLECTOMY         Office Visit on 09/15/2022   Component Date Value Ref Range Status    Sodium 09/15/2022 139  136 - 145 mmol/L Final    Potassium 09/15/2022 4.0  3.5 - 5.1 mmol/L Final    Chloride 09/15/2022 105  99 - 110 mmol/L Final    CO2 09/15/2022 25  21 - 32 mmol/L Final    Anion Gap 09/15/2022 9  3 - 16 Final    Glucose 09/15/2022 112 (A)  70 - 99 mg/dL Final    BUN 09/15/2022 15  7 - 20 mg/dL Final Creatinine 09/15/2022 1.1  0.6 - 1.2 mg/dL Final    GFR Non- 09/15/2022 48 (A)  >60 Final    Comment: >60 mL/min/1.73m2 EGFR, calc. for ages 25 and older using the  MDRD formula (not corrected for weight), is valid for stable  renal function. GFR  09/15/2022 58 (A)  >60 Final    Comment: Chronic Kidney Disease: less than 60 ml/min/1.73 sq.m. Kidney Failure: less than 15 ml/min/1.73 sq.m. Results valid for patients 18 years and older. Calcium 09/15/2022 10.2  8.3 - 10.6 mg/dL Final    Total Protein 09/15/2022 5.6 (A)  6.4 - 8.2 g/dL Final    Albumin 09/15/2022 4.1  3.4 - 5.0 g/dL Final    Albumin/Globulin Ratio 09/15/2022 2.7 (A)  1.1 - 2.2 Final    Total Bilirubin 09/15/2022 0.6  0.0 - 1.0 mg/dL Final    Alkaline Phosphatase 09/15/2022 74  40 - 129 U/L Final    ALT 09/15/2022 16  10 - 40 U/L Final    AST 09/15/2022 20  15 - 37 U/L Final       Family History   Problem Relation Age of Onset    Cerebral palsy Brother     Colon Polyps Brother     Colon Polyps Brother        Current Outpatient Medications   Medication Sig Dispense Refill    furosemide (LASIX) 20 MG tablet TAKE ONE TABLET BY MOUTH DAILY 30 tablet 1    pantoprazole (PROTONIX) 20 MG tablet Take 1 tablet by mouth every morning (before breakfast) 90 tablet 0    albuterol (PROVENTIL) (2.5 MG/3ML) 0.083% nebulizer solution Take 3 mLs by nebulization every 6 hours as needed for Wheezing 120 each 3    gabapentin (NEURONTIN) 400 MG capsule Take 1 capsule by mouth 3 times daily for 331 days.  270 capsule 1    cloNIDine (CATAPRES) 0.1 MG tablet Take 0.1 mg by mouth 2 times daily      traZODone (DESYREL) 100 MG tablet TAKE ONE TABLET BY MOUTH ONCE NIGHTLY AS NEEDED FOR SLEEP 30 tablet 2    solifenacin (VESICARE) 10 MG tablet       losartan (COZAAR) 50 MG tablet       Dextromethorphan-guaiFENesin (ALTARUSSIN DM)  MG/5ML SYRP Take 5 mLs by mouth every 4 hours as needed for Cough 120 mL 0    Zinc 30 MG TABS Take by mouth      TURMERIC CURCUMIN PO Take 2 capsules by mouth daily      ALBUTEROL IN Inhale into the lungs      acetaminophen (TYLENOL) 325 MG tablet Take 650 mg by mouth      acyclovir (ZOVIRAX) 400 MG tablet Take 400 mg by mouth 2 times daily      atenolol (TENORMIN) 50 MG tablet Take 50 mg by mouth in the morning and at bedtime      vitamin D3 (CHOLECALCIFEROL) 125 MCG (5000 UT) TABS tablet Take 3 tablets by mouth daily      docusate (COLACE, DULCOLAX) 100 MG CAPS Take 100 mg by mouth 2 times daily      Echinacea 380 MG CAPS Take 2 capsules by mouth      felodipine (PLENDIL) 5 MG extended release tablet Take 5 mg by mouth daily      hydrALAZINE (APRESOLINE) 25 MG tablet Take 25 mg by mouth 2 times daily      loratadine (CLARITIN) 10 MG tablet Take 10 mg by mouth daily      sertraline (ZOLOFT) 100 MG tablet Take 100 mg by mouth daily      simvastatin (ZOCOR) 40 MG tablet Take 40 mg by mouth nightly       No current facility-administered medications for this visit. Allergies   Allergen Reactions    Alcohol Other (See Comments)    Omeprazole Other (See Comments)     abd pain. Triggers diverticulitis  abd pain. Triggers diverticulitis      Tizanidine Other (See Comments)     Muscles jerked  Muscles jerked         Review of Systems   Constitutional:  Negative for chills and fever. Respiratory:  Negative for shortness of breath. Cardiovascular:  Negative for chest pain and leg swelling. Neurological:  Negative for dizziness and syncope. Vitals:  /64 (Site: Left Upper Arm, Position: Sitting, Cuff Size: Medium Adult)   Pulse (!) 44   Temp 97.5 °F (36.4 °C) (Oral)   Wt 151 lb 6 oz (68.7 kg)   SpO2 95%   BMI 23.71 kg/m²     Physical Exam  Vitals reviewed. Constitutional:       General: She is not in acute distress. HENT:      Head: Normocephalic and atraumatic. Pulmonary:      Effort: Pulmonary effort is normal.   Musculoskeletal:      Right lower leg: No edema.       Left lower leg: No edema.   Skin:     General: Skin is warm and dry. Neurological:      General: No focal deficit present. Mental Status: She is alert and oriented to person, place, and time. Psychiatric:         Mood and Affect: Mood normal.         Behavior: Behavior normal.         Thought Content: Thought content normal.         Judgment: Judgment normal.       Assessment/Plan     1. Bilateral leg edema: resolved with lasix. Continues with some ankle pain that is improving.   - Basic Metabolic Panel; Future   - Continue lasix, check BMP   - Has ECHO order   -Patient aware that if ankle pain does not fully resolve she needs to follow-up as this may be a separate issue from the edema. It is improving with the improvement of her edema and therefore it may be associated. 2. Gastroesophageal reflux disease, unspecified whether esophagitis present: Symptoms resolved with pantoprazole. -Continue pantoprazole   -See GI, has referral    3. Dysphagia, unspecified type: Symptoms resolved with pantoprazole. -Continue pantoprazole   -See GI, has referral    Orders Placed This Encounter   Procedures    Basic Metabolic Panel     Standing Status:   Future     Number of Occurrences:   1     Standing Expiration Date:   10/17/2023       No follow-ups on file. OR sooner with questions, concerns, worsening symptoms    EMA UREÑA, BRANDON  10/17/2022  12:00 PM    Discussed use, benefit, and side effects of prescribed medications. Barriers to medication compliance addressed. Discussed all ordered testing and labs. All patient questions answered. Patient agreeable with plan above. Please note that this chart was generated using dragon dictation software. Although every effort was made to ensure the accuracy of this automated transcription, some errors in transcription may have occurred.

## 2022-10-29 DIAGNOSIS — G47.00 INSOMNIA, UNSPECIFIED TYPE: ICD-10-CM

## 2022-10-31 RX ORDER — TRAZODONE HYDROCHLORIDE 100 MG/1
TABLET ORAL
Qty: 90 TABLET | Refills: 1 | Status: SHIPPED | OUTPATIENT
Start: 2022-10-31

## 2022-10-31 NOTE — TELEPHONE ENCOUNTER
Future Appointments   Date Time Provider Malachi Glynn   11/28/2022  1:40 PM Juan Redd MD [facility]   12/6/2022  7:45 AM [address] IM Zach NARANJO       Last appt [phone]

## 2022-11-22 DIAGNOSIS — K21.9 GASTROESOPHAGEAL REFLUX DISEASE, UNSPECIFIED WHETHER ESOPHAGITIS PRESENT: ICD-10-CM

## 2022-11-22 DIAGNOSIS — R13.10 DYSPHAGIA, UNSPECIFIED TYPE: ICD-10-CM

## 2022-11-22 DIAGNOSIS — R60.0 BILATERAL LEG EDEMA: ICD-10-CM

## 2022-11-22 RX ORDER — FUROSEMIDE 20 MG/1
20 TABLET ORAL DAILY
Qty: 90 TABLET | Refills: 1 | Status: SHIPPED | OUTPATIENT
Start: 2022-11-22

## 2022-11-22 RX ORDER — PANTOPRAZOLE SODIUM 20 MG/1
20 TABLET, DELAYED RELEASE ORAL
Qty: 90 TABLET | Refills: 0 | Status: SHIPPED | OUTPATIENT
Start: 2022-11-22

## 2022-11-22 NOTE — TELEPHONE ENCOUNTER
Last office visit 10/17/22        Future Appointments   Date Time Provider Malachi Conneri   11/28/2022  1:40 PM Jovan Mera MD [facility]   12/6/2022  7:45 AM [address]

## 2022-11-28 ENCOUNTER — OFFICE VISIT (OUTPATIENT)
Dept: RHEUMATOLOGY | Age: 77
End: 2022-11-28
Payer: MEDICARE

## 2022-11-28 VITALS
WEIGHT: 150.4 LBS | DIASTOLIC BLOOD PRESSURE: 70 MMHG | BODY MASS INDEX: 23.61 KG/M2 | HEIGHT: 67 IN | SYSTOLIC BLOOD PRESSURE: 128 MMHG

## 2022-11-28 DIAGNOSIS — M51.36 DDD (DEGENERATIVE DISC DISEASE), LUMBAR: ICD-10-CM

## 2022-11-28 DIAGNOSIS — M79.7 FIBROMYALGIA: ICD-10-CM

## 2022-11-28 DIAGNOSIS — M81.0 AGE-RELATED OSTEOPOROSIS WITHOUT CURRENT PATHOLOGICAL FRACTURE: ICD-10-CM

## 2022-11-28 DIAGNOSIS — M15.9 PRIMARY OSTEOARTHRITIS INVOLVING MULTIPLE JOINTS: ICD-10-CM

## 2022-11-28 DIAGNOSIS — M05.79 RHEUMATOID ARTHRITIS INVOLVING MULTIPLE SITES WITH POSITIVE RHEUMATOID FACTOR (HCC): ICD-10-CM

## 2022-11-28 DIAGNOSIS — R60.0 BILATERAL LEG EDEMA: ICD-10-CM

## 2022-11-28 DIAGNOSIS — M05.79 RHEUMATOID ARTHRITIS INVOLVING MULTIPLE SITES WITH POSITIVE RHEUMATOID FACTOR (HCC): Primary | ICD-10-CM

## 2022-11-28 LAB
ALT SERPL-CCNC: 13 U/L (ref 10–40)
AST SERPL-CCNC: 21 U/L (ref 15–37)
BASOPHILS ABSOLUTE: 0.1 K/UL (ref 0–0.2)
BASOPHILS RELATIVE PERCENT: 0.9 %
CREAT SERPL-MCNC: 1 MG/DL (ref 0.6–1.2)
EOSINOPHILS ABSOLUTE: 0.2 K/UL (ref 0–0.6)
EOSINOPHILS RELATIVE PERCENT: 3.4 %
GFR SERPL CREATININE-BSD FRML MDRD: 57 ML/MIN/{1.73_M2}
HCT VFR BLD CALC: 47.6 % (ref 36–48)
HEMOGLOBIN: 16 G/DL (ref 12–16)
LYMPHOCYTES ABSOLUTE: 0.9 K/UL (ref 1–5.1)
LYMPHOCYTES RELATIVE PERCENT: 15.1 %
MCH RBC QN AUTO: 29.3 PG (ref 26–34)
MCHC RBC AUTO-ENTMCNC: 33.6 G/DL (ref 31–36)
MCV RBC AUTO: 87.3 FL (ref 80–100)
MONOCYTES ABSOLUTE: 0.3 K/UL (ref 0–1.3)
MONOCYTES RELATIVE PERCENT: 5.9 %
NEUTROPHILS ABSOLUTE: 4.2 K/UL (ref 1.7–7.7)
NEUTROPHILS RELATIVE PERCENT: 74.7 %
PDW BLD-RTO: 14 % (ref 12.4–15.4)
PLATELET # BLD: 147 K/UL (ref 135–450)
PMV BLD AUTO: 8.6 FL (ref 5–10.5)
RBC # BLD: 5.46 M/UL (ref 4–5.2)
VITAMIN D 25-HYDROXY: 43.8 NG/ML
WBC # BLD: 5.6 K/UL (ref 4–11)

## 2022-11-28 PROCEDURE — G8399 PT W/DXA RESULTS DOCUMENT: HCPCS | Performed by: INTERNAL MEDICINE

## 2022-11-28 PROCEDURE — 1036F TOBACCO NON-USER: CPT | Performed by: INTERNAL MEDICINE

## 2022-11-28 PROCEDURE — G8420 CALC BMI NORM PARAMETERS: HCPCS | Performed by: INTERNAL MEDICINE

## 2022-11-28 PROCEDURE — G8427 DOCREV CUR MEDS BY ELIG CLIN: HCPCS | Performed by: INTERNAL MEDICINE

## 2022-11-28 PROCEDURE — G8484 FLU IMMUNIZE NO ADMIN: HCPCS | Performed by: INTERNAL MEDICINE

## 2022-11-28 PROCEDURE — 1090F PRES/ABSN URINE INCON ASSESS: CPT | Performed by: INTERNAL MEDICINE

## 2022-11-28 PROCEDURE — 99214 OFFICE O/P EST MOD 30 MIN: CPT | Performed by: INTERNAL MEDICINE

## 2022-11-28 PROCEDURE — 1123F ACP DISCUSS/DSCN MKR DOCD: CPT | Performed by: INTERNAL MEDICINE

## 2022-11-28 RX ORDER — GABAPENTIN 400 MG/1
400 CAPSULE ORAL 3 TIMES DAILY
Qty: 270 CAPSULE | Refills: 1 | Status: SHIPPED | OUTPATIENT
Start: 2022-11-28 | End: 2023-10-25

## 2022-11-28 RX ORDER — PREDNISONE 1 MG/1
TABLET ORAL
Qty: 40 TABLET | Refills: 0 | Status: SHIPPED | OUTPATIENT
Start: 2022-11-28

## 2022-11-28 RX ORDER — HYDROXYCHLOROQUINE SULFATE 200 MG/1
TABLET, FILM COATED ORAL
Qty: 30 TABLET | Refills: 4 | Status: SHIPPED | OUTPATIENT
Start: 2022-11-28

## 2022-11-28 RX ORDER — FUROSEMIDE 20 MG/1
20 TABLET ORAL DAILY
Qty: 90 TABLET | OUTPATIENT
Start: 2022-11-28

## 2022-11-28 NOTE — TELEPHONE ENCOUNTER
Last office visit 10/17/22      Future Appointments   Date Time Provider Malachi Gretta   11/28/2022  1:40 PM Jasen Mac MD [facility]   12/6/2022  7:45 AM [address]

## 2022-11-28 NOTE — PROGRESS NOTES
2022  Patient Name: Dominick Garcia  : [birth year]  Medical Record: [id number]      ASSESSMENT AND PLAN    Assessment/Plan:      ASSESSMENT:    1. Rheumatoid arthritis involving multiple sites with positive rheumatoid factor ([facility] [address]    2. Primary osteoarthritis involving multiple joints    3. Fibromyalgia    4. DDD (degenerative disc disease), lumbar    5. Age-related osteoporosis without current pathological fracture        PLAN:     Yuly Schultz was seen today for follow-up. Diagnoses and all orders for this visit:    Rheumatoid arthritis involving multiple sites with positive rheumatoid factor (HCC)  RF 23, CCP negative, normal ESR and CRP. Hand x-rays with severe degenerative changes in Aia 16 joint, PIP and DIP joints and MCP joints and radiocarpal narrowing  Mild active synovitis on joint exam  Most likely possibility mild rheumatoid arthritis with concomitant osteoarthritis contributing to the joint symptoms  I will start Plaquenil 200 mg daily and prednisone taper  Advised to avoid oral NSAIDs due to chronic kidney disease  Topical diclofenac gel as needed  Tylenol as needed, do not exceed 3 g daily  Plaquenil can cause skin rash, GI issues, visual blurriness and increases the risk of retinal toxicity; recommend baseline eye/retinal exam at 3 to 6 months and then once a year. -     predniSONE (DELTASONE) 5 MG tablet; Take 4 tabs daily for 4 days, 3 tabs daily for 4 days, 2 tabs daily for 4 days, 1 tab daily for 4 days and stop  -     hydroxychloroquine (PLAQUENIL) 200 MG tablet; TAKE 1 TAB BY MOUTH DAILY  -     ALT; Standing  -     AST; Standing  -     CBC with Auto Differential; Standing  -     Creatinine; Standing    Primary osteoarthritis involving multiple joints  Avoid oral NSAIDs due to chronic kidney disease  Diclofenac gel as needed  Tylenol 1000 mg every 8 hours, do not exceed 3 grams daily     Fibromyalgia  Fibromyalgia is a non-life threatening non-rheumatic disease.  Discussed diagnosis, pathophysiology and management options with the patient. I discussed various treatment options with the patient including the medical management and physical therapy/aquatic therapy as well as self exercises. Went over various FDA approved (cymbalta, lyrica, gabapentin, sevalla) and non-fda approved medications (muscle relaxants, ssri's) with the patient. Written material was provided to the patient on fibromyalgia. Most likely primary fibromyalgia  CK, TSH, B12, vitamin D is normal.  Normal ESR and CRP. Stable  Continue gabapentin 400 mg 3 times a day  -Strongly emphasized on low impact aerobic and stretching exercises including biking, swimming, walking, yoga or tiachi classes  -deep breathing and relaxation exercises   -mindfulness techniques  -Counseled on Sleep hygiene   -Advised to drink 64 oz of water   -Limit caffeine intake to 8 oz/day     DDD (degenerative disc disease), lumbar-Right-sided radiculopathy, right SI joint and hip pain. Follows [facility]. Scheduled for radiofrequency ablation. Continue gabapentin 400 mg 3 times a day. Age-related osteoporosis without current pathological fracture  Hand x-rays with severe osteoporosis. No history of fragility fractures. I will obtain DEXA scan to evaluate further.  -     Vitamin D 25 Hydroxy; Future  -     DEXA BONE DENSITY AXIAL SKELETON; Future         The patient indicates understanding of these issues and agrees with the plan. Return in about 3 months (around 2/28/2023). The risks and benefits of my recommendations, as well as other treatment options, benefits and side effects werediscussed with the patient. All questions were answered. MEDICATIONS  Current Outpatient Medications   Medication Sig Dispense Refill    gabapentin (NEURONTIN) 400 MG capsule Take 1 capsule by mouth 3 times daily for 331 days.  270 capsule 1    predniSONE (DELTASONE) 5 MG tablet Take 4 tabs daily for 4 days, 3 tabs daily for 4 days, 2 tabs daily for 4 days, 1 tab daily for 4 days and stop 40 tablet 0    hydroxychloroquine (PLAQUENIL) 200 MG tablet TAKE 1 TAB BY MOUTH DAILY 30 tablet 4    pantoprazole (PROTONIX) 20 MG tablet Take 1 tablet by mouth every morning (before breakfast) 90 tablet 0    furosemide (LASIX) 20 MG tablet Take 1 tablet by mouth daily 90 tablet 1    traZODone (DESYREL) 100 MG tablet TAKE ONE TABLET BY MOUTH ONCE NIGHTLY AS NEEDED FOR SLEEP 90 tablet 1    albuterol (PROVENTIL) (2.5 MG/3ML) 0.083% nebulizer solution Take 3 mLs by nebulization every 6 hours as needed for Wheezing 120 each 3    cloNIDine (CATAPRES) 0.1 MG tablet Take 0.1 mg by mouth 2 times daily      solifenacin (VESICARE) 10 MG tablet       losartan (COZAAR) 50 MG tablet       Dextromethorphan-guaiFENesin (ALTARUSSIN DM)  MG/5ML SYRP Take 5 mLs by mouth every 4 hours as needed for Cough 120 mL 0    Zinc 30 MG TABS Take by mouth      TURMERIC CURCUMIN PO Take 2 capsules by mouth daily      ALBUTEROL IN Inhale into the lungs      acetaminophen (TYLENOL) 325 MG tablet Take 650 mg by mouth      acyclovir (ZOVIRAX) 400 MG tablet Take 400 mg by mouth 2 times daily      atenolol (TENORMIN) 50 MG tablet Take 50 mg by mouth in the morning and at bedtime      vitamin D3 (CHOLECALCIFEROL) 125 MCG (5000 UT) TABS tablet Take 3 tablets by mouth daily      docusate (COLACE, DULCOLAX) 100 MG CAPS Take 100 mg by mouth 2 times daily      Echinacea 380 MG CAPS Take 2 capsules by mouth      felodipine (PLENDIL) 5 MG extended release tablet Take 5 mg by mouth daily      hydrALAZINE (APRESOLINE) 25 MG tablet Take 25 mg by mouth 2 times daily      loratadine (CLARITIN) 10 MG tablet Take 10 mg by mouth daily      sertraline (ZOLOFT) 100 MG tablet Take 100 mg by mouth daily      simvastatin (ZOCOR) 40 MG tablet Take 40 mg by mouth nightly       No current facility-administered medications for this visit.        ALLERGIES  Allergies   Allergen Reactions    Alcohol Other (See Comments) Omeprazole Other (See Comments)     abd pain. Triggers diverticulitis  abd pain. Triggers diverticulitis      Tizanidine Other (See Comments)     Muscles jerked  Muscles jerked           Comments  No specialty comments available. Background rheum history: History of fibromyalgia, osteoarthritis, degenerative disease lumbar spine. RF 23, CCP negative, ANGEL negative, normal ESR and CRP. Status post bilateral CMC joint corticosteroid injections. Other significant medical history of hypertension, chronic kidney disease, DVT, breast cancer  Current Rheum medications gabapentin 300 mg 3 times a day, diclofenac gel as needed trazodone 100 mg at bedtime    Interim history: She presents for follow-up of fibromyalgia, osteoarthritis and degenerative disc disease lumbar spine. She has right lower back and hip pain which radiates to the right lower extremity. She denies tingling or numbness, weakness, bowel or bladder dysfunction or saddle anesthesia. She has degenerative disease in the lumbar spine. She has received epidural spinal actions and nerve blocks  in the past.  She is scheduled for radiofrequency ablation. Fibromyalgia is stable. She is on gabapentin 400 mg 3 times a day. She also takes trazodone 100 mg at bedtime. She continues complain of pain, swelling and stiffness in her joints especially in her hands. Symptoms are worse in the morning. Morning stiffness last for up to 2 hours. She uses topical diclofenac gel and Tylenol as needed with minimal relief. She is unable to take oral NSAIDs due to chronic kidney disease. She had bilateral CMC joint corticosteroid injections in the past.  Blood work showed low titer positive RF 23, CCP negative with normal ESR and CRP. ANGEL is negative. Hand x-rays show multifocal degenerative changes severe in bilateral CMC joints. She denies any side effects with gabapentin.   HPI  Review of Systems    REVIEW OF SYSTEMS: Positive for fatigue, sleep disturbance, anxiety, depression  Constitutional: No unanticipated weight loss or fevers. Integumentary: No rash, photosensitivity, malar rash, livedo reticularis, alopecia and Raynaud's symptoms, sclerodactyly, skin tightening  Eyes: negative for visual disturbance and persistent redness, discharge from eyes   ENT: - No tinnitus, loss of hearing, vertigo, or recurrent ear infections.  - No history of nasal/oral ulcers. - No history of dry eyes/dry mouth  Cardiovascular: No history of pericarditis, chest pain or murmur or palpitations  Respiratory: No shortness of breath, cough or history of interstitial lung disease. No history of pleurisy. No history of tuberculosis or atypical infections. Gastrointestinal: No history of heart burn, dysphagia or esophageal dysmotility. No change in bowel habits or any inflammatory bowel disease. Genitourinary: No history of miscarriages. Hematologic/Lymphatic: No abnormal bruising or bleeding,swollen lymph nodes. Neurological: No history of headaches, seizure or focal weakness. No history of neuropathies, paresthesias or hyperesthesias, facial droop, diplopia  Psychiatric: No history of bipolar disease  Endocrine: Denies any polyuria, polydipsia and osteoporosis  Allergic/Immunologic: No nasal congestion or hives. I have reviewed patients Past medical History, Social History and Family History as mentioned in her chart and this remains unchanged fromprevious.     Past Medical History:   Diagnosis Date    Arthritis     Asthma     Cancer ([facility] [address]     Breast    Chronic kidney disease     Fibromyalgia     Hx of blood clots     Hyperlipidemia     Hypertension     Shingles      Past Surgical History:   Procedure Laterality Date    ARM SURGERY Right     tendonitis    BACK SURGERY Left 2022    BLADDER SURGERY N/A 2019    lift    BREAST SURGERY      CAROTID ENDARTERECTOMY Right     COLONOSCOPY      ENDOSCOPY, COLON, DIAGNOSTIC      EYE SURGERY      HYSTERECTOMY (CERVIX STATUS UNKNOWN) TONSILLECTOMY       Social History     Socioeconomic History    Marital status:       Spouse name: Not on file    Number of children: Not on file    Years of education: Not on file    Highest education level: Not on file   Occupational History    Not on file   Tobacco Use    Smoking status: Former     Packs/day: 2.00     Years: 30.00     Pack years: 60.00     Types: Cigarettes     Quit date: 1980     Years since quittin.9    Smokeless tobacco: Never   Vaping Use    Vaping Use: Never used   Substance and Sexual Activity    Alcohol use: Never    Drug use: Never    Sexual activity: Not on file   Other Topics Concern    Not on file   Social History Narrative    Not on file     Social Determinants of Health     Financial Resource Strain: Low Risk     Difficulty of Paying Living Expenses: Not hard at all   Food Insecurity: No Food Insecurity    Worried About Running Out of Food in the Last Year: Never true    Ran Out of Food in the Last Year: Never true   Transportation Needs: Not on file   Physical Activity: Insufficiently Active    Days of Exercise per Week: 7 days    Minutes of Exercise per Session: 20 min   Stress: Not on file   Social Connections: Not on file   Intimate Partner Violence: Not on file   Housing Stability: Not on file     Family History   Problem Relation Age of Onset    Cerebral palsy Brother     Colon Polyps Brother     Colon Polyps Brother          PHYSICAL EXAM   Vitals:    22 1334   BP: 128/70   Site: Left Upper Arm   Position: Sitting   Cuff Size: Large Adult   Weight: 150 lb 6.4 oz (68.2 kg)   Height: 5' 7\" (1.702 m)     Physical Exam  Constitutional:  Well developed, well nourished, no acute distress, non-toxic appearance   Musculoskeletal:    RIGHT  Swell  Tender  ROM  LEFT  Swell  Tender  ROM    DIP2  0  0   Heberden  0  0   Heberden   DIP3  0  0   Heberden  0  0   Heberden   DIP4  0  0   Heberden  0  0   Heberden   DIP5  0  0   Heberden  0  0   Heberden   PIP1  0  0 bony change  0  0   bony change   PIP2  0  0   bony change  0  0   bony change   PIP3  + +  bony change  + +  bony change   PIP4  + +  bony change  +  +  bony change   PIP5  0  0   bony change  0  0   bony change   MCP1  0  0   bony change  0  0   bony change   MCP2  0  0  FULL   0  0  FULL    MCP3  0  0  FULL   0  0  FULL    MCP4  0  0  FULL   0  0  FULL    MCP5  0  0  FULL   0  0  FULL    Wrist  0  0  FULL   0  0  FULL    Elbow  0  0  FULL   0  0  FULL    Shouldr  0  0  FULL   0  0  FULL    Hip  0  0  FULL   0  0  FULL    Knee  0  0   crepitus/varus  0  0   crepitus/varus   Ankle  0  0  FULL   0  0  FULL    MTP1  0  0  FULL   0  0  FULL    MTP2  0  0  FULL   0  0  FULL    MTP3  0  0  FULL   0  0  FULL    MTP4  0  0  FULL   0  0  FULL    MTP5  0  0  FULL   0  0  FULL    IP1  0  0  FULL   0  0  FULL    IP2  0  0  FULL   0  0  FULL    IP3  0  0  FULL   0  0  FULL    IP4  0  0  FULL   0  0  FULL    IP5  0  0  FULL   0 0 FULL     Squaring, bony enlargement and tenderness of bilateral CMC joints  Ambulates without assistance, normal gait  Neck: Full ROM, no tenderness,supple   Back-diffuse lumbar spinal and paraspinal tenderness +, bilateral SLR negative. Eyes:  PERRL, extra ocular movements intact, conjunctiva normal   HEENT:  Atraumatic, normocephalic, external ears normal, oropharynx moist, no pharyngeal exudates. Respiratory:  No respiratory distress  GI:  Soft, nondistended, normal bowel sounds, nontender, noorganomegaly, no mass, no rebound, no guarding   :  No costovertebral angle tenderness   Integument:  Well hydrated, no rash or telangiectasias  Lymphatic:  No lymphadenopathy noted   Neurologic:   Alert & oriented x 3, CN 2-12 normal, no focal deficits noted. Sensations Intact. Muscle strength 5/5 proximally and distally in upper and lower extremities.    Psychiatric:  Speech and behavior appropriate           LABS AND IMAGING  Outside data reviewed and in HPI    Lab Results   Component Value Date/Time WBC 7.8 04/19/2022 12:56 PM    RBC 4.69 04/19/2022 12:56 PM    HGB 14.1 04/19/2022 12:56 PM    HCT 41.5 04/19/2022 12:56 PM     04/19/2022 12:56 PM    MCV 88.7 04/19/2022 12:56 PM    MCH 30.2 04/19/2022 12:56 PM    MCHC 34.0 04/19/2022 12:56 PM    RDW 13.9 04/19/2022 12:56 PM    LYMPHOPCT 9.0 04/19/2022 12:56 PM    MONOPCT 6.8 04/19/2022 12:56 PM    BASOPCT 0.5 04/19/2022 12:56 PM    MONOSABS 0.5 04/19/2022 12:56 PM    LYMPHSABS 0.7 04/19/2022 12:56 PM    EOSABS 0.2 04/19/2022 12:56 PM    BASOSABS 0.0 04/19/2022 12:56 PM       Chemistry        Component Value Date/Time     10/17/2022 1011    K 4.4 10/17/2022 1011     10/17/2022 1011    CO2 29 10/17/2022 1011    BUN 17 10/17/2022 1011    CREATININE 1.2 10/17/2022 1011        Component Value Date/Time    CALCIUM 10.3 10/17/2022 1011    ALKPHOS 74 09/15/2022 0819    AST 20 09/15/2022 0819    ALT 16 09/15/2022 0819    BILITOT 0.6 09/15/2022 0819          Lab Results   Component Value Date    SEDRATE 8 08/02/2022     No results found for: CRP  No results found for: ANGEL, SAMUEL, SSA, SSB, C3, C4  Lab Results   Component Value Date/Time    RF 23 08/02/2022 09:31 AM     No results found for: ANGEL, ANATITER, ANAINT, PATH  No results found for: DSDNAG, DSDNAIGGIFA  No results found for: SSAROAB, SSALAAB  No results found for: SMAB, RNPAB  No results found for: CENTABIGG  No results found for: C3, C4, ACE  Lab Results   Component Value Date/Time    VITD25 54.9 08/02/2022 09:31 AM     No results found for: Abdirahman Darling  Lab Results   Component Value Date    SBUEOACE06 299 08/02/2022     Lab Results   Component Value Date    TSH 1.570 08/02/2022     Lab Results   Component Value Date    VITD25 54.9 08/02/2022       Radiology:    Bilateral hand x-rays 8/2/2022     FINDINGS:                    BONES:  Severe osteoporosis.   No acute displaced fracture or aggressive osseous lesion   JOINTS:  Severe narrowing of the radiocarpal joint as well as the trapezium-first metacarpal    joint with spurs and subchondral cysts. Narrowing of the metacarpal phalangeal joints is    moderate. Severe narrowing of the PIP and DIP joints. SOFT TISSUES:  Unremarkable   OTHER:  None           IMPRESSION:           Severe osteoporosis with severe narrowing of the PIP and DIP joints. No visible erosions   Severe osteoarthritis of the lateral wrist joints       ######################################################################    I thank you for giving me theopportunity to participate in [facility]. If you have any questions or concerns please feel free to contact me. I look forward to following  Andrews Nicole along with you. Electronically signed by: Lia Garza MD, MD, 11/28/2022 2:03 PM    Documentation was done using voice recognition dragon software. Every effort was made to ensure accuracy;however, inadvertent unintentional computerized transcription errors may be present.

## 2022-12-06 ENCOUNTER — OFFICE VISIT (OUTPATIENT)
Dept: INTERNAL MEDICINE CLINIC | Age: 77
End: 2022-12-06
Payer: MEDICARE

## 2022-12-06 VITALS
BODY MASS INDEX: 24.3 KG/M2 | HEART RATE: 54 BPM | DIASTOLIC BLOOD PRESSURE: 62 MMHG | SYSTOLIC BLOOD PRESSURE: 122 MMHG | TEMPERATURE: 98.7 F | WEIGHT: 155.13 LBS | OXYGEN SATURATION: 97 %

## 2022-12-06 DIAGNOSIS — R60.0 BILATERAL LEG EDEMA: Primary | ICD-10-CM

## 2022-12-06 DIAGNOSIS — I10 PRIMARY HYPERTENSION: ICD-10-CM

## 2022-12-06 DIAGNOSIS — N18.2 CKD (CHRONIC KIDNEY DISEASE) STAGE 2, GFR 60-89 ML/MIN: ICD-10-CM

## 2022-12-06 DIAGNOSIS — K21.9 GASTROESOPHAGEAL REFLUX DISEASE, UNSPECIFIED WHETHER ESOPHAGITIS PRESENT: ICD-10-CM

## 2022-12-06 DIAGNOSIS — J45.20 MILD INTERMITTENT ASTHMA WITHOUT COMPLICATION: ICD-10-CM

## 2022-12-06 DIAGNOSIS — G47.00 INSOMNIA, UNSPECIFIED TYPE: ICD-10-CM

## 2022-12-06 DIAGNOSIS — M79.7 FIBROMYALGIA: ICD-10-CM

## 2022-12-06 DIAGNOSIS — E78.5 HYPERLIPIDEMIA, UNSPECIFIED HYPERLIPIDEMIA TYPE: ICD-10-CM

## 2022-12-06 PROCEDURE — G8399 PT W/DXA RESULTS DOCUMENT: HCPCS | Performed by: NURSE PRACTITIONER

## 2022-12-06 PROCEDURE — 1090F PRES/ABSN URINE INCON ASSESS: CPT | Performed by: NURSE PRACTITIONER

## 2022-12-06 PROCEDURE — 99214 OFFICE O/P EST MOD 30 MIN: CPT | Performed by: NURSE PRACTITIONER

## 2022-12-06 PROCEDURE — G8427 DOCREV CUR MEDS BY ELIG CLIN: HCPCS | Performed by: NURSE PRACTITIONER

## 2022-12-06 PROCEDURE — 3078F DIAST BP <80 MM HG: CPT | Performed by: NURSE PRACTITIONER

## 2022-12-06 PROCEDURE — 1123F ACP DISCUSS/DSCN MKR DOCD: CPT | Performed by: NURSE PRACTITIONER

## 2022-12-06 PROCEDURE — G8484 FLU IMMUNIZE NO ADMIN: HCPCS | Performed by: NURSE PRACTITIONER

## 2022-12-06 PROCEDURE — 1036F TOBACCO NON-USER: CPT | Performed by: NURSE PRACTITIONER

## 2022-12-06 PROCEDURE — G8420 CALC BMI NORM PARAMETERS: HCPCS | Performed by: NURSE PRACTITIONER

## 2022-12-06 PROCEDURE — 3074F SYST BP LT 130 MM HG: CPT | Performed by: NURSE PRACTITIONER

## 2022-12-06 ASSESSMENT — ENCOUNTER SYMPTOMS
SHORTNESS OF BREATH: 0
CONSTIPATION: 0
COUGH: 0
CHOKING: 0
CHEST TIGHTNESS: 0
ABDOMINAL DISTENTION: 0
TROUBLE SWALLOWING: 0

## 2022-12-06 NOTE — PROGRESS NOTES
Date: 12/6/2022                                               Subjective/Objective:     Chief Complaint   Patient presents with    Gastroesophageal Reflux     6 month follow up       HPI    Rafy Ramey is here today for follow up on chronic condition. Last office visit 10.17.2022. Denies concerns today. Bilateral LE swelling: Managed on lasix, started in September 2022. Negative Duplex in September 2022. Reports medication compliance, and denies side effects. Denies swelling, chest pain or shortness of breathe. Does have mild  pain in bilateral ankles occasionally, pain does not effect her ADLs, and feels more like her arthritis to her. GERD: managed on pantoprazole. This was a change after  years on being managed on esomeprazole. New complaints in September of uncontrolled GERD and dysphagia. Was referred to GI, but has not gone. Currently Protonix works well and she has no symptoms of GERD. Denies any trouble swalloing. Has noticied some narrowing in throat, but does not affect her eating or swallwing. Fibromyalgia: Follows with rheumatology. Dr. Augie Seals prescribed prednisone for arthritis at last visit, but waiting till she has completed ablation with [facility] to start new medication. Ablation should be sometime this month. This will be her third ablation. Chronic kidney disease: Follows with nephrology. Last appointment 11.30.22    Hypertension: managed on atenolol, clonidine, hydralazine, felodipine, and losartan. Reports medication compliance, denies medication side effects. Denies chest pain, shortness of breath, or headaches. Hyperlipidemia: managed on simvastatin. Denies medication side effects, chest pain, or shortness of breathe. Asthma: managed on PRN albuterol, has not had to use inhlaer. Reports no exacerbations. Denies chest pain or shortness of breathe. Depression: managed on Zoloft. Denies medication side effects. Reports her mood is doing well.      Insomnia: managed Dextromethorphan-guaiFENesin (ALTARUSSIN DM)  MG/5ML SYRP Take 5 mLs by mouth every 4 hours as needed for Cough 120 mL 0    Zinc 30 MG TABS Take by mouth      TURMERIC CURCUMIN PO Take 2 capsules by mouth daily      ALBUTEROL IN Inhale into the lungs      acetaminophen (TYLENOL) 325 MG tablet Take 650 mg by mouth      acyclovir (ZOVIRAX) 400 MG tablet Take 400 mg by mouth 2 times daily      atenolol (TENORMIN) 50 MG tablet Take 50 mg by mouth in the morning and at bedtime      vitamin D3 (CHOLECALCIFEROL) 125 MCG (5000 UT) TABS tablet Take 3 tablets by mouth daily      docusate (COLACE, DULCOLAX) 100 MG CAPS Take 100 mg by mouth 2 times daily      Echinacea 380 MG CAPS Take 2 capsules by mouth      felodipine (PLENDIL) 5 MG extended release tablet Take 5 mg by mouth daily      hydrALAZINE (APRESOLINE) 25 MG tablet Take 25 mg by mouth 2 times daily      loratadine (CLARITIN) 10 MG tablet Take 10 mg by mouth daily      sertraline (ZOLOFT) 100 MG tablet Take 100 mg by mouth daily      simvastatin (ZOCOR) 40 MG tablet Take 40 mg by mouth nightly       No current facility-administered medications for this visit. Allergies   Allergen Reactions    Alcohol Other (See Comments)    Omeprazole Other (See Comments)     abd pain. Triggers diverticulitis  abd pain. Triggers diverticulitis      Tizanidine Other (See Comments)     Muscles jerked  Muscles jerked         Review of Systems   Constitutional:  Negative for fatigue and fever. HENT:  Negative for trouble swallowing. Respiratory:  Negative for cough, choking, chest tightness and shortness of breath. Cardiovascular:  Negative for chest pain, palpitations and leg swelling. Gastrointestinal:  Negative for abdominal distention and constipation. Musculoskeletal:  Positive for arthralgias. Negative for joint swelling. Mild arthralgia, no change or worsening   Neurological:  Negative for light-headedness and headaches.    Psychiatric/Behavioral: Negative for agitation, self-injury, sleep disturbance and suicidal ideas. Vitals:  /62 (Site: Left Upper Arm, Position: Sitting, Cuff Size: Medium Adult)   Pulse 54   Temp 98.7 °F (37.1 °C) (Oral)   Wt 155 lb 2 oz (70.4 kg)   SpO2 97%   BMI 24.30 kg/m²     Physical Exam  Constitutional:       Appearance: Normal appearance. Cardiovascular:      Rate and Rhythm: Normal rate and regular rhythm. Pulses: Normal pulses. Heart sounds: Normal heart sounds. Pulmonary:      Effort: Pulmonary effort is normal.      Breath sounds: Normal breath sounds. Abdominal:      General: Abdomen is flat. Bowel sounds are normal.      Palpations: Abdomen is soft. Musculoskeletal:         General: Normal range of motion. Cervical back: Neck supple. Skin:     General: Skin is warm and dry. Neurological:      Mental Status: She is alert. Psychiatric:         Mood and Affect: Mood normal.       Assessment/Plan     1. Bilateral leg edema  - Stable: continue on current medication regimen, lasix. 2. Gastroesophageal reflux disease, unspecified whether esophagitis present  - Stable: continue on current medication regimen, Protonix. - Encourages to see GI, referral previously given. 3. Insomnia, unspecified type  - Stable: continue with current medication regimen, trazodone. 4. Mild intermittent asthma without complication  - Stable: continue with current medication regimen, PRN albuterol. 5. Fibromyalgia  - Stable: Follows with specialist, Dr Sylvia Campos. 6. Hyperlipidemia, unspecified hyperlipidemia type  - Stable: continue with current medication regimen simvastatin. - Will need fasting labs in 3 months. 7. CKD (chronic kidney disease) stage 2, GFR 60-89 ml/min  - Stable: Follows with specialist Dr. Chester Andersen. 8. Primary hypertension  - Stable: continue on current medication regimen  atenolol, clonidine, hydralazine, felodipine, and losartan.      No orders of the defined types were placed in this encounter. Return in about 6 months (around 6/6/2023). OR sooner with questions, concerns, worsening symptoms    Cheri Acosta  12/6/2022  10:15 AM    Discussed use, benefit, and side effects of prescribed medications. Barriers to medication compliance addressed. Discussed all ordered testing and labs. All patient questions answered. Patient agreeable with plan above. Please note that this chart was generated using dragon dictation software. Although every effort was made to ensure the accuracy of this automated transcription, some errors in transcription may have occurred.

## 2022-12-06 NOTE — PATIENT INSTRUCTIONS
Important to see GI doctor  [address]   [address]    [place] [address]   Phone: [phone]   Fax: [phone]

## 2022-12-27 DIAGNOSIS — M05.79 RHEUMATOID ARTHRITIS INVOLVING MULTIPLE SITES WITH POSITIVE RHEUMATOID FACTOR (HCC): ICD-10-CM

## 2022-12-27 RX ORDER — HYDROXYCHLOROQUINE SULFATE 200 MG/1
TABLET, FILM COATED ORAL
Qty: 30 TABLET | Refills: 2 | Status: SHIPPED | OUTPATIENT
Start: 2022-12-27

## 2023-02-09 DIAGNOSIS — J45.20 MILD INTERMITTENT ASTHMA WITHOUT COMPLICATION: ICD-10-CM

## 2023-02-09 RX ORDER — ALBUTEROL SULFATE 2.5 MG/3ML
2.5 SOLUTION RESPIRATORY (INHALATION) EVERY 6 HOURS PRN
Qty: 120 EACH | Refills: 2 | Status: SHIPPED | OUTPATIENT
Start: 2023-02-09

## 2023-02-09 NOTE — TELEPHONE ENCOUNTER
Last ov 12 06 22  Future Appointments   Date Time Provider Malachi Glynn   2/28/2023  2:00 PM Epifanio Cobian MD [facility]   6/5/2023  8:15 AM Concepcion Mar, [facility]

## 2023-02-28 ENCOUNTER — OFFICE VISIT (OUTPATIENT)
Dept: RHEUMATOLOGY | Age: 78
End: 2023-02-28
Payer: MEDICARE

## 2023-02-28 VITALS
SYSTOLIC BLOOD PRESSURE: 106 MMHG | DIASTOLIC BLOOD PRESSURE: 78 MMHG | WEIGHT: 166.5 LBS | BODY MASS INDEX: 26.08 KG/M2 | OXYGEN SATURATION: 98 % | HEART RATE: 46 BPM

## 2023-02-28 DIAGNOSIS — M05.79 RHEUMATOID ARTHRITIS INVOLVING MULTIPLE SITES WITH POSITIVE RHEUMATOID FACTOR (HCC): ICD-10-CM

## 2023-02-28 DIAGNOSIS — M15.9 PRIMARY OSTEOARTHRITIS INVOLVING MULTIPLE JOINTS: ICD-10-CM

## 2023-02-28 DIAGNOSIS — Z79.899 HIGH RISK MEDICATION USE: ICD-10-CM

## 2023-02-28 DIAGNOSIS — M81.0 AGE-RELATED OSTEOPOROSIS WITHOUT CURRENT PATHOLOGICAL FRACTURE: ICD-10-CM

## 2023-02-28 DIAGNOSIS — M79.7 FIBROMYALGIA: Primary | ICD-10-CM

## 2023-02-28 DIAGNOSIS — M51.36 DDD (DEGENERATIVE DISC DISEASE), LUMBAR: ICD-10-CM

## 2023-02-28 PROCEDURE — G8417 CALC BMI ABV UP PARAM F/U: HCPCS | Performed by: INTERNAL MEDICINE

## 2023-02-28 PROCEDURE — 1036F TOBACCO NON-USER: CPT | Performed by: INTERNAL MEDICINE

## 2023-02-28 PROCEDURE — 1090F PRES/ABSN URINE INCON ASSESS: CPT | Performed by: INTERNAL MEDICINE

## 2023-02-28 PROCEDURE — G8484 FLU IMMUNIZE NO ADMIN: HCPCS | Performed by: INTERNAL MEDICINE

## 2023-02-28 PROCEDURE — 99214 OFFICE O/P EST MOD 30 MIN: CPT | Performed by: INTERNAL MEDICINE

## 2023-02-28 PROCEDURE — G8399 PT W/DXA RESULTS DOCUMENT: HCPCS | Performed by: INTERNAL MEDICINE

## 2023-02-28 PROCEDURE — 1123F ACP DISCUSS/DSCN MKR DOCD: CPT | Performed by: INTERNAL MEDICINE

## 2023-02-28 PROCEDURE — G8427 DOCREV CUR MEDS BY ELIG CLIN: HCPCS | Performed by: INTERNAL MEDICINE

## 2023-02-28 RX ORDER — PREDNISONE 1 MG/1
TABLET ORAL
Qty: 40 TABLET | Refills: 0 | Status: SHIPPED | OUTPATIENT
Start: 2023-02-28

## 2023-02-28 RX ORDER — HYDROXYCHLOROQUINE SULFATE 200 MG/1
TABLET, FILM COATED ORAL
Qty: 30 TABLET | Refills: 3 | Status: SHIPPED | OUTPATIENT
Start: 2023-02-28

## 2023-02-28 RX ORDER — LEFLUNOMIDE 10 MG/1
10 TABLET ORAL DAILY
Qty: 30 TABLET | Refills: 2 | Status: SHIPPED | OUTPATIENT
Start: 2023-02-28

## 2023-02-28 NOTE — PROGRESS NOTES
2023  Patient Name: Carlos A Dunlap  : [birth year]  Medical Record: [id number]      ASSESSMENT AND PLAN    Assessment/Plan:      ASSESSMENT:    No diagnosis found. PLAN:     Tej Fontenot was seen today for follow-up. Diagnoses and all orders for this visit:    Rheumatoid arthritis involving multiple sites with positive rheumatoid factor (HCC)  RF 23, CCP negative, normal ESR and CRP. Hand x-rays with severe degenerative changes in [facility] joint, PIP and DIP joints and MCP joints and radiocarpal narrowing  Mild active synovitis on joint exam  Most likely possibility mild rheumatoid arthritis with concomitant osteoarthritis contributing to the joint symptoms  I will start Plaquenil 200 mg daily and prednisone taper  Advised to avoid oral NSAIDs due to chronic kidney disease  Topical diclofenac gel as needed  Tylenol as needed, do not exceed 3 g daily  Plaquenil can cause skin rash, GI issues, visual blurriness and increases the risk of retinal toxicity; recommend baseline eye/retinal exam at 3 to 6 months and then once a year. -     predniSONE (DELTASONE) 5 MG tablet; Take 4 tabs daily for 4 days, 3 tabs daily for 4 days, 2 tabs daily for 4 days, 1 tab daily for 4 days and stop  -     hydroxychloroquine (PLAQUENIL) 200 MG tablet; TAKE 1 TAB BY MOUTH DAILY  -     ALT; Standing  -     AST; Standing  -     CBC with Auto Differential; Standing  -     Creatinine; Standing    Primary osteoarthritis involving multiple joints  Avoid oral NSAIDs due to chronic kidney disease  Diclofenac gel as needed  Tylenol 1000 mg every 8 hours, do not exceed 3 grams daily     Fibromyalgia  Fibromyalgia is a non-life threatening non-rheumatic disease. Discussed diagnosis, pathophysiology and management options with the patient. I discussed various treatment options with the patient including the medical management and physical therapy/aquatic therapy as well as self exercises.  Went over various FDA approved (Lindsay Andrew, gabapentin, sevalla) and non-fda approved medications (muscle relaxants, ssri's) with the patient. Written material was provided to the patient on fibromyalgia. Most likely primary fibromyalgia  CK, TSH, B12, vitamin D is normal.  Normal ESR and CRP. Stable  Continue gabapentin 400 mg 3 times a day  -Strongly emphasized on low impact aerobic and stretching exercises including biking, swimming, walking, yoga or tiachi classes  -deep breathing and relaxation exercises   -mindfulness techniques  -Counseled on Sleep hygiene   -Advised to drink 64 oz of water   -Limit caffeine intake to 8 oz/day     DDD (degenerative disc disease), lumbar-Right-sided radiculopathy, right SI joint and hip pain. Follows [facility]. Scheduled for radiofrequency ablation. Continue gabapentin 400 mg 3 times a day. Age-related osteoporosis without current pathological fracture  Hand x-rays with severe osteoporosis. No history of fragility fractures. I will obtain DEXA scan to evaluate further.  -     Vitamin D 25 Hydroxy; Future  -     DEXA BONE DENSITY AXIAL SKELETON; Future         The patient indicates understanding of these issues and agrees with the plan. No follow-ups on file. The risks and benefits of my recommendations, as well as other treatment options, benefits and side effects werediscussed with the patient. All questions were answered.        MEDICATIONS  Current Outpatient Medications   Medication Sig Dispense Refill    albuterol (PROVENTIL) (2.5 MG/3ML) 0.083% nebulizer solution Take 3 mLs by nebulization every 6 hours as needed for Wheezing 120 each 2    hydroxychloroquine (PLAQUENIL) 200 MG tablet TAKE 1 TAB BY MOUTH DAILY 30 tablet 2    cloNIDine (CATAPRES) 0.1 MG tablet Take 0.1 mg by mouth 2 times daily      ALBUTEROL IN Inhale into the lungs      acetaminophen (TYLENOL) 325 MG tablet Take 650 mg by mouth      acyclovir (ZOVIRAX) 400 MG tablet Take 400 mg by mouth 2 times daily      atenolol (TENORMIN) 50 MG tablet Take 50 mg by mouth in the morning and at bedtime      gabapentin (NEURONTIN) 400 MG capsule Take 1 capsule by mouth 3 times daily for 331 days. 270 capsule 1    predniSONE (DELTASONE) 5 MG tablet Take 4 tabs daily for 4 days, 3 tabs daily for 4 days, 2 tabs daily for 4 days, 1 tab daily for 4 days and stop 40 tablet 0    pantoprazole (PROTONIX) 20 MG tablet Take 1 tablet by mouth every morning (before breakfast) 90 tablet 0    furosemide (LASIX) 20 MG tablet Take 1 tablet by mouth daily 90 tablet 1    traZODone (DESYREL) 100 MG tablet TAKE ONE TABLET BY MOUTH ONCE NIGHTLY AS NEEDED FOR SLEEP 90 tablet 1    solifenacin (VESICARE) 10 MG tablet       losartan (COZAAR) 50 MG tablet       Zinc 30 MG TABS Take by mouth      TURMERIC CURCUMIN PO Take 2 capsules by mouth daily      vitamin D3 (CHOLECALCIFEROL) 125 MCG (5000 UT) TABS tablet Take 3 tablets by mouth daily      docusate (COLACE, DULCOLAX) 100 MG CAPS Take 100 mg by mouth 2 times daily      Echinacea 380 MG CAPS Take 2 capsules by mouth      felodipine (PLENDIL) 5 MG extended release tablet Take 5 mg by mouth daily      hydrALAZINE (APRESOLINE) 25 MG tablet Take 25 mg by mouth 2 times daily      loratadine (CLARITIN) 10 MG tablet Take 10 mg by mouth daily      sertraline (ZOLOFT) 100 MG tablet Take 100 mg by mouth daily      simvastatin (ZOCOR) 40 MG tablet Take 40 mg by mouth nightly       No current facility-administered medications for this visit. ALLERGIES  Allergies   Allergen Reactions    Alcohol Other (See Comments)    Omeprazole Other (See Comments)     abd pain. Triggers diverticulitis  abd pain. Triggers diverticulitis      Tizanidine Other (See Comments)     Muscles jerked  Muscles jerked           Comments  No specialty comments available. Background rheum history: History of fibromyalgia, osteoarthritis, degenerative disease lumbar spine. RF 23, CCP negative, ANGEL negative, normal ESR and CRP.   Status post bilateral CMC joint corticosteroid injections. Other significant medical history of hypertension, chronic kidney disease, DVT, breast cancer  Current Rheum medications gabapentin 300 mg 3 times a day, diclofenac gel as needed trazodone 100 mg at bedtime    Interim history: She presents for follow-up of fibromyalgia, osteoarthritis and degenerative disc disease lumbar spine. She has right lower back and hip pain which radiates to the right lower extremity. She denies tingling or numbness, weakness, bowel or bladder dysfunction or saddle anesthesia. She has degenerative disease in the lumbar spine. She has received epidural spinal actions and nerve blocks  in the past.  She is scheduled for radiofrequency ablation. Fibromyalgia is stable. She is on gabapentin 400 mg 3 times a day. She also takes trazodone 100 mg at bedtime. She continues complain of pain, swelling and stiffness in her joints especially in her hands. Symptoms are worse in the morning. Morning stiffness last for up to 2 hours. She uses topical diclofenac gel and Tylenol as needed with minimal relief. She is unable to take oral NSAIDs due to chronic kidney disease. She had bilateral CMC joint corticosteroid injections in the past.  Blood work showed low titer positive RF 23, CCP negative with normal ESR and CRP. ANGEL is negative. Hand x-rays show multifocal degenerative changes severe in bilateral CMC joints. She denies any side effects with gabapentin. Arthritis    Review of Systems   Musculoskeletal:  Positive for arthritis. REVIEW OF SYSTEMS: Positive for fatigue, sleep disturbance, anxiety, depression  Constitutional: No unanticipated weight loss or fevers.    Integumentary: No rash, photosensitivity, malar rash, livedo reticularis, alopecia and Raynaud's symptoms, sclerodactyly, skin tightening  Eyes: negative for visual disturbance and persistent redness, discharge from eyes   ENT: - No tinnitus, loss of hearing, vertigo, or recurrent ear infections.  - No history of nasal/oral ulcers. - No history of dry eyes/dry mouth  Cardiovascular: No history of pericarditis, chest pain or murmur or palpitations  Respiratory: No shortness of breath, cough or history of interstitial lung disease. No history of pleurisy. No history of tuberculosis or atypical infections. Gastrointestinal: No history of heart burn, dysphagia or esophageal dysmotility. No change in bowel habits or any inflammatory bowel disease. Genitourinary: No history of miscarriages. Hematologic/Lymphatic: No abnormal bruising or bleeding,swollen lymph nodes. Neurological: No history of headaches, seizure or focal weakness. No history of neuropathies, paresthesias or hyperesthesias, facial droop, diplopia  Psychiatric: No history of bipolar disease  Endocrine: Denies any polyuria, polydipsia and osteoporosis  Allergic/Immunologic: No nasal congestion or hives. I have reviewed patients Past medical History, Social History and Family History as mentioned in her chart and this remains unchanged fromprevious. Past Medical History:   Diagnosis Date    Arthritis     Asthma     Cancer ([facility] [address]     Breast    Chronic kidney disease     Fibromyalgia     Hx of blood clots     Hyperlipidemia     Hypertension     Shingles      Past Surgical History:   Procedure Laterality Date    ARM SURGERY Right     tendonitis    BACK SURGERY Left 2022    BLADDER SURGERY N/A 2019    lift    BREAST SURGERY      CAROTID ENDARTERECTOMY Right     COLONOSCOPY      ENDOSCOPY, COLON, DIAGNOSTIC      EYE SURGERY      HYSTERECTOMY (CERVIX STATUS UNKNOWN)      TONSILLECTOMY       Social History     Socioeconomic History    Marital status:       Spouse name: Not on file    Number of children: Not on file    Years of education: Not on file    Highest education level: Not on file   Occupational History    Not on file   Tobacco Use    Smoking status: Former     Packs/day: 2.00     Years: 30.00     Pack years: 60.00 Types: Cigarettes     Quit date: 1980     Years since quittin.2    Smokeless tobacco: Never   Vaping Use    Vaping Use: Never used   Substance and Sexual Activity    Alcohol use: Never    Drug use: Never    Sexual activity: Not on file   Other Topics Concern    Not on file   Social History Narrative    Not on file     Social Determinants of Health     Financial Resource Strain: Low Risk     Difficulty of Paying Living Expenses: Not hard at all   Food Insecurity: No Food Insecurity    Worried About Running Out of Food in the Last Year: Never true    Ran Out of Food in the Last Year: Never true   Transportation Needs: Not on file   Physical Activity: Insufficiently Active    Days of Exercise per Week: 7 days    Minutes of Exercise per Session: 20 min   Stress: Not on file   Social Connections: Not on file   Intimate Partner Violence: Not on file   Housing Stability: Not on file     Family History   Problem Relation Age of Onset    Cerebral palsy Brother     Colon Polyps Brother     Colon Polyps Brother          PHYSICAL EXAM   Vitals:    23 1411   Weight: 166 lb 8 oz (75.5 kg)     Physical Exam  Constitutional:  Well developed, well nourished, no acute distress, non-toxic appearance   Musculoskeletal:    RIGHT  Swell  Tender  ROM  LEFT  Swell  Tender  ROM    DIP2  0  0   [place]berden  0  0   Heberden   DIP3  0  0   Heberden  0  0   Heberden   DIP4  0  0   Heberden  0  0   Heberden   DIP5  0  0   Heberden  0  0   Heberden   PIP1  0  0   bony change  0  0   bony change   PIP2  0  0   bony change  0  0   bony change   PIP3  0 0  bony change  + +  bony change   PIP4  + +  bony change  +  +  bony change   PIP5  0  0   bony change  0  0   bony change   MCP1  0  0   bony change  0  0   bony change   MCP2  0  0  FULL   0  0  FULL    MCP3  0  0  FULL   0  0  FULL    MCP4  0  0  FULL   0  0  FULL    MCP5  0  0  FULL   0  0  FULL    Wrist  0  0  FULL   0  0  FULL    Elbow  0  0  FULL   0  0  FULL    Shouldr  0 0  FULL   0  0  FULL    Hip  0  0  FULL   0  0  FULL    Knee  0  0   crepitus/varus  0  0   crepitus/varus   Ankle  0  0  FULL   0  0  FULL    MTP1  0  0  FULL   0  0  FULL    MTP2  0  0  FULL   0  0  FULL    MTP3  0  0  FULL   0  0  FULL    MTP4  0  0  FULL   0  0  FULL    MTP5  0  0  FULL   0  0  FULL    IP1  0  0  FULL   0  0  FULL    IP2  0  0  FULL   0  0  FULL    IP3  0  0  FULL   0  0  FULL    IP4  0  0  FULL   0  0  FULL    IP5  0  0  FULL   0 0 FULL     Squaring, bony enlargement and tenderness of bilateral CMC joints  Ambulates without assistance, normal gait  Neck: Full ROM, no tenderness,supple   Back-diffuse lumbar spinal and paraspinal tenderness +, bilateral SLR negative. Eyes:  PERRL, extra ocular movements intact, conjunctiva normal   HEENT:  Atraumatic, normocephalic, external ears normal, oropharynx moist, no pharyngeal exudates. Respiratory:  No respiratory distress  GI:  Soft, nondistended, normal bowel sounds, nontender, noorganomegaly, no mass, no rebound, no guarding   :  No costovertebral angle tenderness   Integument:  Well hydrated, no rash or telangiectasias  Lymphatic:  No lymphadenopathy noted   Neurologic:   Alert & oriented x 3, CN 2-12 normal, no focal deficits noted. Sensations Intact. Muscle strength 5/5 proximally and distally in upper and lower extremities.    Psychiatric:  Speech and behavior appropriate           LABS AND IMAGING  Outside data reviewed and in HPI    Lab Results   Component Value Date/Time    WBC 5.6 11/28/2022 02:15 PM    RBC 5.46 11/28/2022 02:15 PM    HGB 16.0 11/28/2022 02:15 PM    HCT 47.6 11/28/2022 02:15 PM     11/28/2022 02:15 PM    MCV 87.3 11/28/2022 02:15 PM    MCH 29.3 11/28/2022 02:15 PM    MCHC 33.6 11/28/2022 02:15 PM    RDW 14.0 11/28/2022 02:15 PM    LYMPHOPCT 15.1 11/28/2022 02:15 PM    MONOPCT 5.9 11/28/2022 02:15 PM    BASOPCT 0.9 11/28/2022 02:15 PM    MONOSABS 0.3 11/28/2022 02:15 PM    LYMPHSABS 0.9 11/28/2022 02:15 PM    EOSABS 0.2 2022 02:15 PM    BASOSABS 0.1 2022 02:15 PM       Chemistry        Component Value Date/Time     10/17/2022 1011    K 4.4 10/17/2022 1011     10/17/2022 1011    CO2 29 10/17/2022 1011    BUN 17 10/17/2022 1011    CREATININE 1.0 2022 1415        Component Value Date/Time    CALCIUM 10.3 10/17/2022 1011    ALKPHOS 74 09/15/2022 0819    AST 21 2022 1415    ALT 13 2022 1415    BILITOT 0.6 09/15/2022 0819          Lab Results   Component Value Date    SEDRATE 8 2022     No results found for: CRP  No results found for: NAGEL, SAMUEL, SSA, SSB, C3, C4  Lab Results   Component Value Date/Time    RF 23 2022 09:31 AM     No results found for: ANGEL, ANATITER, ANAINT, PATH  No results found for: DSDNAG, DSDNAIGGIFA  No results found for: SSAROAB, SSALAAB  No results found for: SMAB, RNPAB  No results found for: CENTABIGG  No results found for: C3, C4, ACE  Lab Results   Component Value Date/Time    VITD25 43.8 2022 02:15 PM     No results found for: Linda Rawson  Lab Results   Component Value Date    BTMKVDAF79 299 2022     Lab Results   Component Value Date    TSH 1.570 2022     Lab Results   Component Value Date    VITD25 43.8 2022       Radiology:    Bilateral hand x-rays 2022     FINDINGS:                    BONES:  Severe osteoporosis. No acute displaced fracture or aggressive osseous lesion   JOINTS:  Severe narrowing of the radiocarpal joint as well as the trapezium-first metacarpal    joint with spurs and subchondral cysts. Narrowing of the metacarpal phalangeal joints is    moderate. Severe narrowing of the PIP and DIP joints. SOFT TISSUES:  Unremarkable   OTHER:  None           IMPRESSION:           Severe osteoporosis with severe narrowing of the PIP and DIP joints.    No visible erosions   Severe osteoarthritis of the lateral wrist joints       ######################################################################    I thank you for giving me theopportunity to participate in [facility]. If you have any questions or concerns please feel free to contact me. I look forward to following  Henna Kelly along with you. Electronically signed by: Mario Woodward MD, MD, 2/28/2023 2:13 PM    Documentation was done using voice recognition dragon software. Every effort was made to ensure accuracy;however, inadvertent unintentional computerized transcription errors may be present.

## 2023-03-06 DIAGNOSIS — J45.20 MILD INTERMITTENT ASTHMA WITHOUT COMPLICATION: ICD-10-CM

## 2023-03-07 RX ORDER — ALBUTEROL SULFATE 90 UG/1
1 AEROSOL, METERED RESPIRATORY (INHALATION) EVERY 4 HOURS PRN
Qty: 1 EACH | Refills: 2 | Status: SHIPPED | OUTPATIENT
Start: 2023-03-07

## 2023-03-07 RX ORDER — ALBUTEROL SULFATE 2.5 MG/3ML
2.5 SOLUTION RESPIRATORY (INHALATION) EVERY 6 HOURS PRN
Qty: 120 EACH | OUTPATIENT
Start: 2023-03-07

## 2023-03-07 NOTE — TELEPHONE ENCOUNTER
Future Appointments   Date Time Provider Malachi Glynn   4/25/2023  2:00 PM Juany Taylor MD FF RHE[facility]   6/8/2023  9:00 AM Adán Mark, [address] [place] [address] Drive [facility] [place] - [place]     Last appt on 12.6.2022

## 2023-03-07 NOTE — TELEPHONE ENCOUNTER
----- Message from Arik Michelle sent at 3/6/2023  8:29 PM EST -----  Regarding: Albuterol IN  I need a refill on this perscription

## 2023-03-14 DIAGNOSIS — R13.10 DYSPHAGIA, UNSPECIFIED TYPE: ICD-10-CM

## 2023-03-14 DIAGNOSIS — K21.9 GASTROESOPHAGEAL REFLUX DISEASE, UNSPECIFIED WHETHER ESOPHAGITIS PRESENT: ICD-10-CM

## 2023-03-14 RX ORDER — PANTOPRAZOLE SODIUM 20 MG/1
TABLET, DELAYED RELEASE ORAL
Qty: 90 TABLET | Refills: 0 | Status: SHIPPED | OUTPATIENT
Start: 2023-03-14

## 2023-03-23 DIAGNOSIS — M05.79 RHEUMATOID ARTHRITIS INVOLVING MULTIPLE SITES WITH POSITIVE RHEUMATOID FACTOR (HCC): ICD-10-CM

## 2023-03-23 RX ORDER — HYDROXYCHLOROQUINE SULFATE 200 MG/1
TABLET, FILM COATED ORAL
Qty: 30 TABLET | Refills: 3 | Status: CANCELLED | OUTPATIENT
Start: 2023-03-23

## 2023-03-24 RX ORDER — HYDROXYCHLOROQUINE SULFATE 200 MG/1
TABLET, FILM COATED ORAL
Qty: 30 TABLET | Refills: 0 | Status: SHIPPED | OUTPATIENT
Start: 2023-03-24

## 2023-03-28 LAB
BASOPHILS ABSOLUTE: 0 THOU/MCL (ref 0–0.2)
BASOPHILS ABSOLUTE: 1 %
EOSINOPHILS ABSOLUTE: 0.1 THOU/MCL (ref 0.03–0.45)
EOSINOPHILS RELATIVE PERCENT: 2 %
HCT VFR BLD CALC: 42.1 % (ref 36–46)
HEMOGLOBIN: 14.3 G/DL (ref 12–15.2)
LYMPHOCYTES ABSOLUTE: 0.8 THOU/MCL (ref 1–4)
LYMPHOCYTES RELATIVE PERCENT: 18 %
MCH RBC QN AUTO: 29.7 PG (ref 27–33)
MCHC RBC AUTO-ENTMCNC: 33.9 G/DL (ref 32–36)
MCV RBC AUTO: 87.6 FL (ref 82–97)
MONOCYTES # BLD: 6 %
MONOCYTES ABSOLUTE: 0.3 THOU/MCL (ref 0.2–0.9)
NEUTROPHILS ABSOLUTE: 3.5 THOU/MCL (ref 1.8–7.7)
PDW BLD-RTO: 13.6 % (ref 12.3–17)
PLATELET # BLD: 128 THOU/MCL (ref 140–375)
PMV BLD AUTO: 8 FL (ref 7.4–11.5)
RBC # BLD: 4.81 MIL/MCL (ref 3.8–5.2)
SEG NEUTROPHILS: 73 %
WBC # BLD: 4.8 THOU/MCL (ref 3.6–10.5)

## 2023-04-27 RX ORDER — SIMVASTATIN 40 MG
40 TABLET ORAL NIGHTLY
Qty: 90 TABLET | Refills: 0 | Status: SHIPPED | OUTPATIENT
Start: 2023-04-27

## 2023-05-04 DIAGNOSIS — G47.00 INSOMNIA, UNSPECIFIED TYPE: ICD-10-CM

## 2023-05-04 RX ORDER — TRAZODONE HYDROCHLORIDE 100 MG/1
TABLET ORAL
Qty: 90 TABLET | Refills: 1 | Status: SHIPPED | OUTPATIENT
Start: 2023-05-04

## 2023-05-04 NOTE — TELEPHONE ENCOUNTER
Future Appointments   Date Time Provider Malachi Glynn   6/8/2023  9:00 AM Lyndon Amin, [address] [facility] [place] - [place]     Last appt on 12.6.2022

## 2023-06-07 PROBLEM — E78.5 HYPERLIPIDEMIA: Status: ACTIVE | Noted: 2023-06-07

## 2023-06-07 PROBLEM — I10 PRIMARY HYPERTENSION: Status: ACTIVE | Noted: 2023-06-07

## 2023-06-07 PROBLEM — N18.2 CKD (CHRONIC KIDNEY DISEASE) STAGE 2, GFR 60-89 ML/MIN: Status: ACTIVE | Noted: 2023-06-07

## 2023-06-07 PROBLEM — K21.9 GASTROESOPHAGEAL REFLUX DISEASE: Status: ACTIVE | Noted: 2023-06-07

## 2023-06-07 NOTE — PROGRESS NOTES
breast cancer screening    Lipid, Fasting     Standing Status:   Future     Number of Occurrences:   1     Standing Expiration Date:   7/8/6993    Basic Metabolic Panel     Standing Status:   Future     Number of Occurrences:   1     Standing Expiration Date:   6/8/2024    DALJIT Wynn MD, Rheumatology, [facility]     Referral Priority:   Routine     Referral Type:   Eval and Treat     Referral Reason:   Specialty Services Required     Referred to Provider:   Wale Jin MD     Requested Specialty:   Rheumatology     Number of Visits Requested:   1    DALJIT Gayle MD, Gastroenterology, [facility]     Referral Priority:   Routine     Referral Type:   Eval and Treat     Referral Reason:   Specialty Services Required     Referred to Provider:   Joanne Hoffman MD     Requested Specialty:   Gastroenterology     Number of Visits Requested:   1       Return in about 6 months (around 12/8/2023) for hypertension. OR sooner with questions, concerns, worsening symptoms    BRANDON CORBETT  6/8/2023  10:35 AM    Discussed use, benefit, and side effects of prescribed medications. Barriers to medication compliance addressed. Discussed all ordered testing and labs. All patient questions answered. Patient agreeable with plan above. Please note that this chart was generated using dragon dictation software. Although every effort was made to ensure the accuracy of this automated transcription, some errors in transcription may have occurred.

## 2023-06-08 ENCOUNTER — OFFICE VISIT (OUTPATIENT)
Dept: INTERNAL MEDICINE CLINIC | Age: 78
End: 2023-06-08
Payer: MEDICARE

## 2023-06-08 VITALS
TEMPERATURE: 97.4 F | WEIGHT: 152 LBS | OXYGEN SATURATION: 98 % | HEART RATE: 43 BPM | SYSTOLIC BLOOD PRESSURE: 132 MMHG | DIASTOLIC BLOOD PRESSURE: 80 MMHG | BODY MASS INDEX: 23.81 KG/M2

## 2023-06-08 DIAGNOSIS — R60.0 BILATERAL LEG EDEMA: ICD-10-CM

## 2023-06-08 DIAGNOSIS — I10 PRIMARY HYPERTENSION: Primary | ICD-10-CM

## 2023-06-08 DIAGNOSIS — E78.5 HYPERLIPIDEMIA, UNSPECIFIED HYPERLIPIDEMIA TYPE: ICD-10-CM

## 2023-06-08 DIAGNOSIS — N18.2 CKD (CHRONIC KIDNEY DISEASE) STAGE 2, GFR 60-89 ML/MIN: ICD-10-CM

## 2023-06-08 DIAGNOSIS — Z12.31 ENCOUNTER FOR SCREENING MAMMOGRAM FOR MALIGNANT NEOPLASM OF BREAST: ICD-10-CM

## 2023-06-08 DIAGNOSIS — M79.7 FIBROMYALGIA: ICD-10-CM

## 2023-06-08 DIAGNOSIS — K21.9 GASTROESOPHAGEAL REFLUX DISEASE, UNSPECIFIED WHETHER ESOPHAGITIS PRESENT: ICD-10-CM

## 2023-06-08 LAB
ANION GAP SERPL CALCULATED.3IONS-SCNC: 12 MMOL/L (ref 3–16)
BUN SERPL-MCNC: 13 MG/DL (ref 7–20)
CALCIUM SERPL-MCNC: 10.6 MG/DL (ref 8.3–10.6)
CHLORIDE SERPL-SCNC: 103 MMOL/L (ref 99–110)
CHOLEST SERPL-MCNC: 145 MG/DL (ref 0–199)
CO2 SERPL-SCNC: 27 MMOL/L (ref 21–32)
CREAT SERPL-MCNC: 1.1 MG/DL (ref 0.6–1.2)
GFR SERPLBLD CREATININE-BSD FMLA CKD-EPI: 51 ML/MIN/{1.73_M2}
GLUCOSE SERPL-MCNC: 108 MG/DL (ref 70–99)
HDLC SERPL-MCNC: 52 MG/DL (ref 40–60)
LDL CHOLESTEROL CALCULATED: 70 MG/DL
POTASSIUM SERPL-SCNC: 4.1 MMOL/L (ref 3.5–5.1)
SODIUM SERPL-SCNC: 142 MMOL/L (ref 136–145)
TRIGL SERPL-MCNC: 117 MG/DL (ref 0–150)
VLDLC SERPL CALC-MCNC: 23 MG/DL

## 2023-06-08 PROCEDURE — G8399 PT W/DXA RESULTS DOCUMENT: HCPCS | Performed by: NURSE PRACTITIONER

## 2023-06-08 PROCEDURE — 99214 OFFICE O/P EST MOD 30 MIN: CPT | Performed by: NURSE PRACTITIONER

## 2023-06-08 PROCEDURE — 3075F SYST BP GE 130 - 139MM HG: CPT | Performed by: NURSE PRACTITIONER

## 2023-06-08 PROCEDURE — G8427 DOCREV CUR MEDS BY ELIG CLIN: HCPCS | Performed by: NURSE PRACTITIONER

## 2023-06-08 PROCEDURE — G8420 CALC BMI NORM PARAMETERS: HCPCS | Performed by: NURSE PRACTITIONER

## 2023-06-08 PROCEDURE — 1090F PRES/ABSN URINE INCON ASSESS: CPT | Performed by: NURSE PRACTITIONER

## 2023-06-08 PROCEDURE — 36415 COLL VENOUS BLD VENIPUNCTURE: CPT | Performed by: NURSE PRACTITIONER

## 2023-06-08 PROCEDURE — 3079F DIAST BP 80-89 MM HG: CPT | Performed by: NURSE PRACTITIONER

## 2023-06-08 PROCEDURE — 1036F TOBACCO NON-USER: CPT | Performed by: NURSE PRACTITIONER

## 2023-06-08 PROCEDURE — 1123F ACP DISCUSS/DSCN MKR DOCD: CPT | Performed by: NURSE PRACTITIONER

## 2023-06-08 SDOH — ECONOMIC STABILITY: FOOD INSECURITY: WITHIN THE PAST 12 MONTHS, THE FOOD YOU BOUGHT JUST DIDN'T LAST AND YOU DIDN'T HAVE MONEY TO GET MORE.: NEVER TRUE

## 2023-06-08 SDOH — ECONOMIC STABILITY: INCOME INSECURITY: HOW HARD IS IT FOR YOU TO PAY FOR THE VERY BASICS LIKE FOOD, HOUSING, MEDICAL CARE, AND HEATING?: NOT VERY HARD

## 2023-06-08 SDOH — ECONOMIC STABILITY: HOUSING INSECURITY
IN THE LAST 12 MONTHS, WAS THERE A TIME WHEN YOU DID NOT HAVE A STEADY PLACE TO SLEEP OR SLEPT IN A SHELTER (INCLUDING NOW)?: NO

## 2023-06-08 SDOH — ECONOMIC STABILITY: FOOD INSECURITY: WITHIN THE PAST 12 MONTHS, YOU WORRIED THAT YOUR FOOD WOULD RUN OUT BEFORE YOU GOT MONEY TO BUY MORE.: NEVER TRUE

## 2023-06-08 ASSESSMENT — PATIENT HEALTH QUESTIONNAIRE - PHQ9
3. TROUBLE FALLING OR STAYING ASLEEP: 0
2. FEELING DOWN, DEPRESSED OR HOPELESS: 0
SUM OF ALL RESPONSES TO PHQ QUESTIONS 1-9: 0
SUM OF ALL RESPONSES TO PHQ QUESTIONS 1-9: 0
7. TROUBLE CONCENTRATING ON THINGS, SUCH AS READING THE NEWSPAPER OR WATCHING TELEVISION: 0
9. THOUGHTS THAT YOU WOULD BE BETTER OFF DEAD, OR OF HURTING YOURSELF: 0
SUM OF ALL RESPONSES TO PHQ QUESTIONS 1-9: 0
8. MOVING OR SPEAKING SO SLOWLY THAT OTHER PEOPLE COULD HAVE NOTICED. OR THE OPPOSITE, BEING SO FIGETY OR RESTLESS THAT YOU HAVE BEEN MOVING AROUND A LOT MORE THAN USUAL: 0
10. IF YOU CHECKED OFF ANY PROBLEMS, HOW DIFFICULT HAVE THESE PROBLEMS MADE IT FOR YOU TO DO YOUR WORK, TAKE CARE OF THINGS AT HOME, OR GET ALONG WITH OTHER PEOPLE: 0
SUM OF ALL RESPONSES TO PHQ QUESTIONS 1-9: 0
5. POOR APPETITE OR OVEREATING: 0
SUM OF ALL RESPONSES TO PHQ9 QUESTIONS 1 & 2: 0
6. FEELING BAD ABOUT YOURSELF - OR THAT YOU ARE A FAILURE OR HAVE LET YOURSELF OR YOUR FAMILY DOWN: 0
4. FEELING TIRED OR HAVING LITTLE ENERGY: 0
1. LITTLE INTEREST OR PLEASURE IN DOING THINGS: 0

## 2023-06-08 ASSESSMENT — ENCOUNTER SYMPTOMS
SHORTNESS OF BREATH: 0
CONSTIPATION: 1

## 2023-06-09 DIAGNOSIS — K21.9 GASTROESOPHAGEAL REFLUX DISEASE, UNSPECIFIED WHETHER ESOPHAGITIS PRESENT: ICD-10-CM

## 2023-06-09 DIAGNOSIS — R13.10 DYSPHAGIA, UNSPECIFIED TYPE: ICD-10-CM

## 2023-06-09 RX ORDER — PANTOPRAZOLE SODIUM 20 MG/1
TABLET, DELAYED RELEASE ORAL
Qty: 90 TABLET | Refills: 1 | Status: SHIPPED | OUTPATIENT
Start: 2023-06-09

## 2023-06-09 NOTE — TELEPHONE ENCOUNTER
Last office visit 6/9/23      Future Appointments   Date Time Provider Malachi Glynn   11/9/2023  8:45 AM Arlette Lyeva, [address]

## 2023-06-30 DIAGNOSIS — M79.7 FIBROMYALGIA: ICD-10-CM

## 2023-06-30 DIAGNOSIS — M51.36 DDD (DEGENERATIVE DISC DISEASE), LUMBAR: ICD-10-CM

## 2023-07-03 RX ORDER — GABAPENTIN 400 MG/1
400 CAPSULE ORAL 3 TIMES DAILY
Qty: 270 CAPSULE | Refills: 0 | Status: SHIPPED | OUTPATIENT
Start: 2023-07-03 | End: 2023-10-01

## 2023-08-18 RX ORDER — SIMVASTATIN 40 MG
40 TABLET ORAL NIGHTLY
Qty: 90 TABLET | Refills: 1 | Status: SHIPPED | OUTPATIENT
Start: 2023-08-18

## 2023-09-05 DIAGNOSIS — F32.A DEPRESSION, UNSPECIFIED DEPRESSION TYPE: Primary | ICD-10-CM

## 2023-09-05 RX ORDER — SERTRALINE HYDROCHLORIDE 100 MG/1
100 TABLET, FILM COATED ORAL DAILY
Qty: 90 TABLET | Refills: 1 | Status: SHIPPED | OUTPATIENT
Start: 2023-09-05

## 2023-11-02 DIAGNOSIS — G47.00 INSOMNIA, UNSPECIFIED TYPE: ICD-10-CM

## 2023-11-02 RX ORDER — TRAZODONE HYDROCHLORIDE 100 MG/1
TABLET ORAL
Qty: 90 TABLET | Refills: 1 | Status: SHIPPED | OUTPATIENT
Start: 2023-11-02

## 2023-11-02 NOTE — TELEPHONE ENCOUNTER
Future Appointments   Date Time Provider [address]   11/9/2023  8:45 AM Aston Rivas, [address] [facility]         Last appt 6/12/23

## 2023-11-09 ENCOUNTER — OFFICE VISIT (OUTPATIENT)
Dept: INTERNAL MEDICINE CLINIC | Age: 78
End: 2023-11-09
Payer: MEDICARE

## 2023-11-09 VITALS
WEIGHT: 148.38 LBS | SYSTOLIC BLOOD PRESSURE: 136 MMHG | DIASTOLIC BLOOD PRESSURE: 78 MMHG | BODY MASS INDEX: 23.24 KG/M2 | TEMPERATURE: 97.7 F | OXYGEN SATURATION: 97 % | HEART RATE: 54 BPM

## 2023-11-09 DIAGNOSIS — R51.9 ACUTE INTRACTABLE HEADACHE, UNSPECIFIED HEADACHE TYPE: ICD-10-CM

## 2023-11-09 DIAGNOSIS — D69.6 THROMBOCYTOPENIA, UNSPECIFIED (HCC): ICD-10-CM

## 2023-11-09 DIAGNOSIS — R26.89 IMPAIRMENT OF BALANCE: ICD-10-CM

## 2023-11-09 DIAGNOSIS — G47.00 INSOMNIA, UNSPECIFIED TYPE: ICD-10-CM

## 2023-11-09 DIAGNOSIS — K21.9 GASTROESOPHAGEAL REFLUX DISEASE, UNSPECIFIED WHETHER ESOPHAGITIS PRESENT: ICD-10-CM

## 2023-11-09 DIAGNOSIS — I10 PRIMARY HYPERTENSION: Primary | ICD-10-CM

## 2023-11-09 DIAGNOSIS — N18.2 CKD (CHRONIC KIDNEY DISEASE) STAGE 2, GFR 60-89 ML/MIN: ICD-10-CM

## 2023-11-09 DIAGNOSIS — R41.3 MEMORY LOSS: ICD-10-CM

## 2023-11-09 DIAGNOSIS — E78.5 HYPERLIPIDEMIA, UNSPECIFIED HYPERLIPIDEMIA TYPE: ICD-10-CM

## 2023-11-09 PROCEDURE — G8427 DOCREV CUR MEDS BY ELIG CLIN: HCPCS | Performed by: NURSE PRACTITIONER

## 2023-11-09 PROCEDURE — 99214 OFFICE O/P EST MOD 30 MIN: CPT | Performed by: NURSE PRACTITIONER

## 2023-11-09 PROCEDURE — 1090F PRES/ABSN URINE INCON ASSESS: CPT | Performed by: NURSE PRACTITIONER

## 2023-11-09 PROCEDURE — G8399 PT W/DXA RESULTS DOCUMENT: HCPCS | Performed by: NURSE PRACTITIONER

## 2023-11-09 PROCEDURE — G8420 CALC BMI NORM PARAMETERS: HCPCS | Performed by: NURSE PRACTITIONER

## 2023-11-09 PROCEDURE — 1036F TOBACCO NON-USER: CPT | Performed by: NURSE PRACTITIONER

## 2023-11-09 PROCEDURE — 3075F SYST BP GE 130 - 139MM HG: CPT | Performed by: NURSE PRACTITIONER

## 2023-11-09 PROCEDURE — 1124F ACP DISCUSS-NO DSCNMKR DOCD: CPT | Performed by: NURSE PRACTITIONER

## 2023-11-09 PROCEDURE — G8484 FLU IMMUNIZE NO ADMIN: HCPCS | Performed by: NURSE PRACTITIONER

## 2023-11-09 PROCEDURE — 3078F DIAST BP <80 MM HG: CPT | Performed by: NURSE PRACTITIONER

## 2023-11-09 RX ORDER — ATENOLOL 25 MG/1
25 TABLET ORAL DAILY
COMMUNITY

## 2023-11-09 ASSESSMENT — ENCOUNTER SYMPTOMS
SHORTNESS OF BREATH: 0
CONSTIPATION: 0

## 2023-11-09 NOTE — PROGRESS NOTES
Date: 11/9/2023                                               Subjective/Objective:     Chief Complaint   Patient presents with    Hypertension    Hyperlipidemia       HPI      Ashlyn Burnham is a 78 yo female, visit today for follow up on chronic medical conditions. She c/o impaired balance, when she gets up out of her chair she feels like she is leaning to the right. This resolves after walking. She is not falling. No dizziness. Has noticed worsening short term memory. No speech difficulties. No N/T. Has a left temporal headache today. Has not had headaches before today. Describes this as a pressure. Rates this as mild. Has taken Tylenol for this, unsure if this has helped. Does not typically get headaches. Bilateral LE edema managed on furosemide, started in September 2022. BLE duplex negative 9/2022. Edema is at baseline. GERD - on pantoprazole. New complaints in September of uncontrolled GERD and dysphagia, was changed to pantoprazole and referred to GI at that time - has upcoming EGD scheduled. Fibromyalgia following with rheumatology. CKD following with nephrology. Hypertension - on atenolol, clonidine, hydralazine and losartan. Home BP reported to be around 130s/80s. Denies chest pain, shortness of breath. Hyperlipidemia on simvastatin. Asthma on PRN albuterol. Needing albuterol rarely, triggered with allergy changes. Depression on sertraline. Feels medication is very effective. Insomnia managed on trazodone. Constipation managed on sennakot. Already received flu vaccination this year.         Colon cancer screening: colonoscopy 7/2017 Dr Jung Clemons, 10 year follow up recommended   Gyn:               Last Mammogram: 7/2023  Osteoporosis Screening: needs, has order         Patient Active Problem List    Diagnosis Date Noted    Thrombocytopenia, unspecified ([address]) 11/09/2023    Fibromyalgia 06/08/2023    Bilateral leg edema 06/08/2023    Primary

## 2023-11-13 ENCOUNTER — TELEPHONE (OUTPATIENT)
Dept: INTERNAL MEDICINE CLINIC | Age: 78
End: 2023-11-13

## 2023-11-13 DIAGNOSIS — G47.39 SLEEP APNEA-LIKE BEHAVIOR: Primary | ICD-10-CM

## 2023-11-13 NOTE — TELEPHONE ENCOUNTER
Spoke with pt, she is on [place] for the week (not driving). Asked pt if she would be able to come in tomorrow with a ride she said no. Told pt she should go to ER if she can not wait until her CT on 11/22/23. Told pt I would let PCP know as well.

## 2023-11-13 NOTE — TELEPHONE ENCOUNTER
----- Message from Nory Krueger Eric Crawford sent at 11/12/2023  4:08 PM EST -----  Regarding: Last Visit - Sleep  Contact: [phone]  I forgot to mention that on rare occasions I fall asleep. Sometimes I sit in my chair and wake up three hours later. I have no indication I am even tire. This has happened when driving. I get very tired, have no control over staying awake or sleeping. I just nod off. So far I have recovered and awaken when driving. Thanks to the raised portions of the road or someone is with me to wake me up. This does not happen every day or week or month. I was driving home from [facility] and the kids woke me up and would not let me drive. The next time I was driving [address] alone and dosed off and woke up in another lamar. Ney no traffic. Should I come in or what?

## 2023-11-15 ENCOUNTER — ANESTHESIA EVENT (OUTPATIENT)
Dept: ENDOSCOPY | Age: 78
End: 2023-11-15
Payer: MEDICARE

## 2023-11-16 ENCOUNTER — ANESTHESIA (OUTPATIENT)
Dept: ENDOSCOPY | Age: 78
End: 2023-11-16
Payer: MEDICARE

## 2023-11-16 ENCOUNTER — HOSPITAL ENCOUNTER (OUTPATIENT)
Age: 78
Setting detail: OUTPATIENT SURGERY
Discharge: HOME | End: 2023-11-16
Attending: INTERNAL MEDICINE | Admitting: INTERNAL MEDICINE
Payer: MEDICARE

## 2023-11-16 VITALS
HEART RATE: 53 BPM | SYSTOLIC BLOOD PRESSURE: 156 MMHG | DIASTOLIC BLOOD PRESSURE: 80 MMHG | BODY MASS INDEX: 24.01 KG/M2 | HEIGHT: 67 IN | TEMPERATURE: 97 F | WEIGHT: 153 LBS | OXYGEN SATURATION: 96 % | RESPIRATION RATE: 13 BRPM

## 2023-11-16 DIAGNOSIS — K21.9 GASTROESOPHAGEAL REFLUX DISEASE WITHOUT ESOPHAGITIS: ICD-10-CM

## 2023-11-16 PROCEDURE — 2709999900 HC NON-CHARGEABLE SUPPLY: Performed by: INTERNAL MEDICINE

## 2023-11-16 PROCEDURE — 2500000003 HC RX 250 WO HCPCS

## 2023-11-16 PROCEDURE — 88305 TISSUE EXAM BY PATHOLOGIST: CPT

## 2023-11-16 PROCEDURE — 2580000003 HC RX 258: Performed by: ANESTHESIOLOGY

## 2023-11-16 PROCEDURE — 6360000002 HC RX W HCPCS

## 2023-11-16 PROCEDURE — 3609012400 HC EGD TRANSORAL BIOPSY SINGLE/MULTIPLE: Performed by: INTERNAL MEDICINE

## 2023-11-16 PROCEDURE — 3700000000 HC ANESTHESIA ATTENDED CARE: Performed by: INTERNAL MEDICINE

## 2023-11-16 PROCEDURE — 7100000001 HC PACU RECOVERY - ADDTL 15 MIN: Performed by: INTERNAL MEDICINE

## 2023-11-16 PROCEDURE — 7100000000 HC PACU RECOVERY - FIRST 15 MIN: Performed by: INTERNAL MEDICINE

## 2023-11-16 RX ORDER — SODIUM CHLORIDE 0.9 % (FLUSH) 0.9 %
5-40 SYRINGE (ML) INJECTION PRN
Status: DISCONTINUED | OUTPATIENT
Start: 2023-11-16 | End: 2023-11-16 | Stop reason: HOSPADM

## 2023-11-16 RX ORDER — ONDANSETRON 2 MG/ML
4 INJECTION INTRAMUSCULAR; INTRAVENOUS
Status: DISCONTINUED | OUTPATIENT
Start: 2023-11-16 | End: 2023-11-16 | Stop reason: HOSPADM

## 2023-11-16 RX ORDER — SODIUM CHLORIDE 0.9 % (FLUSH) 0.9 %
5-40 SYRINGE (ML) INJECTION EVERY 12 HOURS SCHEDULED
Status: DISCONTINUED | OUTPATIENT
Start: 2023-11-16 | End: 2023-11-16 | Stop reason: HOSPADM

## 2023-11-16 RX ORDER — SODIUM CHLORIDE 9 MG/ML
INJECTION, SOLUTION INTRAVENOUS PRN
Status: DISCONTINUED | OUTPATIENT
Start: 2023-11-16 | End: 2023-11-16 | Stop reason: HOSPADM

## 2023-11-16 RX ORDER — PROPOFOL 10 MG/ML
INJECTION, EMULSION INTRAVENOUS PRN
Status: DISCONTINUED | OUTPATIENT
Start: 2023-11-16 | End: 2023-11-16 | Stop reason: SDUPTHER

## 2023-11-16 RX ORDER — DIPHENHYDRAMINE HYDROCHLORIDE 50 MG/ML
12.5 INJECTION INTRAMUSCULAR; INTRAVENOUS
Status: DISCONTINUED | OUTPATIENT
Start: 2023-11-16 | End: 2023-11-16 | Stop reason: HOSPADM

## 2023-11-16 RX ORDER — LIDOCAINE HYDROCHLORIDE 20 MG/ML
INJECTION, SOLUTION EPIDURAL; INFILTRATION; INTRACAUDAL; PERINEURAL PRN
Status: DISCONTINUED | OUTPATIENT
Start: 2023-11-16 | End: 2023-11-16 | Stop reason: SDUPTHER

## 2023-11-16 RX ADMIN — SODIUM CHLORIDE: 9 INJECTION, SOLUTION INTRAVENOUS at 07:25

## 2023-11-16 RX ADMIN — PROPOFOL 50 MG: 10 INJECTION, EMULSION INTRAVENOUS at 08:08

## 2023-11-16 RX ADMIN — LIDOCAINE HYDROCHLORIDE 50 MG: 20 INJECTION, SOLUTION EPIDURAL; INFILTRATION; INTRACAUDAL; PERINEURAL at 08:08

## 2023-11-16 RX ADMIN — PROPOFOL 160 MCG/KG/MIN: 10 INJECTION, EMULSION INTRAVENOUS at 08:09

## 2023-11-16 ASSESSMENT — PAIN - FUNCTIONAL ASSESSMENT: PAIN_FUNCTIONAL_ASSESSMENT: NONE - DENIES PAIN

## 2023-11-16 ASSESSMENT — LIFESTYLE VARIABLES: SMOKING_STATUS: 0

## 2023-11-16 NOTE — DISCHARGE INSTRUCTIONS
There was some mild redness in your stomach, called gastritis. Recommendations:   Continue pre procedure medications and diet as tolerated  Await pathology results   The patient had biopsies taken today. The patient should check patient portal for results in 7 days and call us if they have not heard from our office within 14 days. Our number is [phone]. Endoscopy Discharge Instructions      You may be drowsy or lightheaded after receiving sedation. DO NOT operate  a vehicle (automobile, bicycle, motorcycle, machinery, or power tools), no  alcoholic beverages, and do not make any important decisions today. Plan on bed rest or quiet relaxation today. Resume normal activities in the morning. Resume normal activity tomorrow unless otherwise advised by your physician. Eat a light first meal, avoiding spicy and fatty foods, then resume normal diet unless  you are told otherwise by your physician. If the intravenous medication site is painful, apply warm compresses on the site until the soreness is relieved and elevate the arm above the heart. Call your physician if no improvement  in 2-3 days. POSSIBLE SYMPTOMS TO WATCH:     1. fever (greater than 100) 5. increased abdominal bloating   2. severe pain   6. excessive bleeding   3. nausea and vomiting  7. chest pain   4. chills    8. shortness of breath       Expected as normal and remedies:  Sore throat: use over the counter throat lozenges or gargle with warm salt water. Redness or soreness at the IV site: apply warm compress  Gaseous discomfort: belching or passing flatus (gas).

## 2023-11-16 NOTE — OP NOTE
condition. Estimated blood loss: None    ID Type Source Tests Collected by Time Destination   A : Gastric Biopsies Gastric Gastric SURGICAL PATHOLOGY Cici Armijo MD 11/16/2023 0972            Impression:   1) See post procedure diagnoses    Recommendations:   Continue pre procedure medications and diet as tolerated  Await pathology results   The patient had biopsies taken today. The patient should check patient portal for results in 7 days and call us if they have not heard from our office within 14 days. Our number is [phone].       Cici Armijo MD  [address] [facility] and [address] [facility]

## 2023-11-16 NOTE — H&P
Pre-operative History and Physical    Patient: Kiera Nicole  : 1945  Acct#:     Intended Procedure:  EGD     HISTORY OF PRESENT ILLNESS:  The patient is a 77 y.o. female  who presents for/due to new onset dyspepsia      Past Medical History:        Diagnosis Date    Arthritis     Asthma     Cancer ([address])     Breast    Chronic kidney disease     Fibromyalgia     Hx of blood clots     Hyperlipidemia     Hypertension     Shingles      Past Surgical History:        Procedure Laterality Date    ARM SURGERY Right     tendonitis    BACK SURGERY Left     back ablation    BLADDER SURGERY N/A     lift    BREAST SURGERY      CAROTID ENDARTERECTOMY Right     COLONOSCOPY      ENDOSCOPY, COLON, DIAGNOSTIC      EYE SURGERY      HYSTERECTOMY (CERVIX STATUS UNKNOWN)      TONSILLECTOMY       Medications Prior to Admission:   No current facility-administered medications on file prior to encounter. Current Outpatient Medications on File Prior to Encounter   Medication Sig Dispense Refill    simvastatin (ZOCOR) 40 MG tablet Take 1 tablet by mouth nightly 90 tablet 1    gabapentin (NEURONTIN) 400 MG capsule Take 1 capsule by mouth 3 times daily for 90 days.  270 capsule 0    furosemide (LASIX) 20 MG tablet TAKE ONE TABLET BY MOUTH DAILY 90 tablet 3    pantoprazole (PROTONIX) 20 MG tablet TAKE ONE TABLET BY MOUTH EVERY MORNING BEFORE BREAKFAST 90 tablet 1    hydroxychloroquine (PLAQUENIL) 200 MG tablet TAKE 1 TAB BY MOUTH DAILY 30 tablet 0    albuterol sulfate HFA (PROVENTIL;VENTOLIN;PROAIR) 108 (90 Base) MCG/ACT inhaler Inhale 1 puff into the lungs every 4 hours as needed for Wheezing or Shortness of Breath 1 each 2    albuterol (PROVENTIL) (2.5 MG/3ML) 0.083% nebulizer solution Take 3 mLs by nebulization every 6 hours as needed for Wheezing 120 each 2    cloNIDine (CATAPRES) 0.1 MG tablet Take 1 tablet by mouth 2 times daily      losartan (COZAAR) 50 MG tablet Take 1 tablet by mouth daily      TURMERIC

## 2023-11-16 NOTE — ANESTHESIA POSTPROCEDURE EVALUATION
Department of Anesthesiology  Postprocedure Note    Patient: Guillermo Choi  MRN: [id number]  YOB: 1945  Date of evaluation: 11/16/2023      Procedure Summary       Date: 11/16/23 Room / Location: [address] [place] [address]    Anesthesia Start: 6414 Anesthesia Stop: 3932    Procedure: EGD BIOPSY Diagnosis:       Gastroesophageal reflux disease without esophagitis      (Gastroesophageal reflux disease without esophagitis [K21.9])    Surgeons: Charline Diaz MD Responsible Provider: Stephon Thomas MD    Anesthesia Type: MAC ASA Status: 3            Anesthesia Type: No value filed.     Emelia Phase I: Emelia Score: 9    Emelia Phase II: Emelia Score: 10      Anesthesia Post Evaluation    Patient location during evaluation: bedside  Patient participation: complete - patient participated  Level of consciousness: awake and alert  Pain score: 0  Nausea & Vomiting: no nausea  Complications: no  Cardiovascular status: hemodynamically stable  Respiratory status: acceptable  Hydration status: stable  Pain management: adequate

## 2023-11-16 NOTE — PROGRESS NOTES
Sedation provided per anesthesia. See anesthesia record for medication administration and vitals.
[facility] Pre-Admission Testing Electronic Communication Worksheet for OR/ENDO Procedures        Patient: Bharat Davis    DOS: 11/16    Arrival Time: 7    Surgery Time:830    Meds to Bed:  [x] YES    []  NO    Transportation Confirmed: [x] YES    []  NO    History and Physical:  [] YES    []  NO  [x] N/A  If yes, please list doctor or Urgent Care and date of H&P:     Additional Clearance(Cardiac, Pulmonary, etc):  [] YES    [x]  NO    Pre-Admission Testing Visit:  [] YES    [x]  NO If no, do labs/testing need to be done DOS?   [] YES    [x]  NO    Medication Reconciliation Complete:  [x] YES    []  NO        Additional Notes:                Interview Complete: [x] YES    []  NO          Sheyla Lanza RN  12:05 PM
For your safety, please do not wear any jewelry or body piercing's on the day of surgery. All jewelry must be removed. If you have dentures, they will be removed before going to operating room. For your convenience, we will provide you with a container. If you wear contact lenses or glasses, they will be removed, please bring a case for them. If you have a living will and a durable power of  for healthcare, please bring in a copy. As part of our patient safety program to minimize surgical site infections, we ask you to do the following:    Please notify your surgeon if you develop any illness between         now and the  day of your surgery. This includes a cough, cold, fever, sore throat, nausea,         or vomiting, and diarrhea, etc.   Please notify your surgeon if you experience dizziness, shortness         of breath or blurred vision between now and the time of your surgery. Do not shave your operative site 96 hours prior to surgery. For face and neck surgery, men may use an electric razor 48 hours   prior to surgery. You may shower the night before surgery or the morning of   your surgery with an antibacterial soap. You will need to bring a photo ID and insurance card    [facility] has an onsite pharmacy, would you like to utilize our pharmacy     If you will be staying overnight and use a C-pap machine, please bring   your C-pap to hospital     Our goal is to provide you with excellent care, therefore, visitors will be limited to two(2) in the room at a time so that we may focus on providing this care for you. Please contact pre-admission testing if you have any further questions. [facility] phone number:  [address] [place] fax number:  [phone]  Please note these are generalized instructions for all surgical cases, you may be provided with more specific instructions according to your surgery.     C-Difficile admission screening

## 2023-11-21 ENCOUNTER — TELEPHONE (OUTPATIENT)
Dept: INTERNAL MEDICINE CLINIC | Age: 78
End: 2023-11-21

## 2023-11-21 NOTE — TELEPHONE ENCOUNTER
[facility] [employer] called, can you please send a new order for her CT head without contrast    [phone], opt 2, opt 1    Thank you

## 2023-12-06 DIAGNOSIS — K21.9 GASTROESOPHAGEAL REFLUX DISEASE, UNSPECIFIED WHETHER ESOPHAGITIS PRESENT: ICD-10-CM

## 2023-12-06 DIAGNOSIS — R13.10 DYSPHAGIA, UNSPECIFIED TYPE: ICD-10-CM

## 2023-12-06 NOTE — TELEPHONE ENCOUNTER
Future Appointments   Date Time Provider [address]  [address]   12/12/2023  8:20 AM Stanislav Norton MD [facility]         Last appointment 11/09/23

## 2023-12-07 DIAGNOSIS — K21.9 GASTROESOPHAGEAL REFLUX DISEASE, UNSPECIFIED WHETHER ESOPHAGITIS PRESENT: ICD-10-CM

## 2023-12-07 DIAGNOSIS — R13.10 DYSPHAGIA, UNSPECIFIED TYPE: ICD-10-CM

## 2023-12-07 RX ORDER — PANTOPRAZOLE SODIUM 20 MG/1
TABLET, DELAYED RELEASE ORAL
Qty: 90 TABLET | Refills: 1 | Status: SHIPPED | OUTPATIENT
Start: 2023-12-07

## 2023-12-07 RX ORDER — PANTOPRAZOLE SODIUM 20 MG/1
20 TABLET, DELAYED RELEASE ORAL
Qty: 90 TABLET | Refills: 1 | OUTPATIENT
Start: 2023-12-07

## 2023-12-12 ENCOUNTER — OFFICE VISIT (OUTPATIENT)
Dept: SLEEP MEDICINE | Age: 78
End: 2023-12-12
Payer: MEDICARE

## 2023-12-12 VITALS
RESPIRATION RATE: 18 BRPM | OXYGEN SATURATION: 98 % | DIASTOLIC BLOOD PRESSURE: 60 MMHG | WEIGHT: 153 LBS | HEIGHT: 67 IN | HEART RATE: 50 BPM | SYSTOLIC BLOOD PRESSURE: 100 MMHG | TEMPERATURE: 96.9 F | BODY MASS INDEX: 24.01 KG/M2

## 2023-12-12 DIAGNOSIS — K21.9 GASTROESOPHAGEAL REFLUX DISEASE, UNSPECIFIED WHETHER ESOPHAGITIS PRESENT: ICD-10-CM

## 2023-12-12 DIAGNOSIS — I10 PRIMARY HYPERTENSION: ICD-10-CM

## 2023-12-12 DIAGNOSIS — F51.04 INSOMNIA, PSYCHOPHYSIOLOGICAL: ICD-10-CM

## 2023-12-12 DIAGNOSIS — G47.33 OSA (OBSTRUCTIVE SLEEP APNEA): Primary | ICD-10-CM

## 2023-12-12 DIAGNOSIS — R06.83 SNORING: ICD-10-CM

## 2023-12-12 DIAGNOSIS — R60.0 BILATERAL LEG EDEMA: ICD-10-CM

## 2023-12-12 PROCEDURE — 3078F DIAST BP <80 MM HG: CPT | Performed by: PSYCHIATRY & NEUROLOGY

## 2023-12-12 PROCEDURE — G8420 CALC BMI NORM PARAMETERS: HCPCS | Performed by: PSYCHIATRY & NEUROLOGY

## 2023-12-12 PROCEDURE — 1124F ACP DISCUSS-NO DSCNMKR DOCD: CPT | Performed by: PSYCHIATRY & NEUROLOGY

## 2023-12-12 PROCEDURE — G8427 DOCREV CUR MEDS BY ELIG CLIN: HCPCS | Performed by: PSYCHIATRY & NEUROLOGY

## 2023-12-12 PROCEDURE — 99204 OFFICE O/P NEW MOD 45 MIN: CPT | Performed by: PSYCHIATRY & NEUROLOGY

## 2023-12-12 PROCEDURE — G8399 PT W/DXA RESULTS DOCUMENT: HCPCS | Performed by: PSYCHIATRY & NEUROLOGY

## 2023-12-12 PROCEDURE — G8484 FLU IMMUNIZE NO ADMIN: HCPCS | Performed by: PSYCHIATRY & NEUROLOGY

## 2023-12-12 PROCEDURE — 1090F PRES/ABSN URINE INCON ASSESS: CPT | Performed by: PSYCHIATRY & NEUROLOGY

## 2023-12-12 PROCEDURE — 3074F SYST BP LT 130 MM HG: CPT | Performed by: PSYCHIATRY & NEUROLOGY

## 2023-12-12 PROCEDURE — 1036F TOBACCO NON-USER: CPT | Performed by: PSYCHIATRY & NEUROLOGY

## 2023-12-12 ASSESSMENT — SLEEP AND FATIGUE QUESTIONNAIRES
HOW LIKELY ARE YOU TO NOD OFF OR FALL ASLEEP WHILE SITTING INACTIVE IN A PUBLIC PLACE: 3
HOW LIKELY ARE YOU TO NOD OFF OR FALL ASLEEP IN A CAR, WHILE STOPPED FOR A FEW MINUTES IN TRAFFIC: 2
HOW LIKELY ARE YOU TO NOD OFF OR FALL ASLEEP WHILE SITTING AND TALKING TO SOMEONE: 0
ESS TOTAL SCORE: 17
HOW LIKELY ARE YOU TO NOD OFF OR FALL ASLEEP WHILE SITTING AND READING: 3
HOW LIKELY ARE YOU TO NOD OFF OR FALL ASLEEP WHILE SITTING QUIETLY AFTER LUNCH WITHOUT ALCOHOL: 3
HOW LIKELY ARE YOU TO NOD OFF OR FALL ASLEEP WHILE LYING DOWN TO REST IN THE AFTERNOON WHEN CIRCUMSTANCES PERMIT: 0
HOW LIKELY ARE YOU TO NOD OFF OR FALL ASLEEP WHEN YOU ARE A PASSENGER IN A CAR FOR AN HOUR WITHOUT A BREAK: 3
NECK CIRCUMFERENCE (INCHES): 14
HOW LIKELY ARE YOU TO NOD OFF OR FALL ASLEEP WHILE WATCHING TV: 3

## 2023-12-12 ASSESSMENT — ENCOUNTER SYMPTOMS
SORE THROAT: 1
GASTROINTESTINAL NEGATIVE: 1
RESPIRATORY NEGATIVE: 1
ALLERGIC/IMMUNOLOGIC NEGATIVE: 1
EYES NEGATIVE: 1

## 2023-12-12 NOTE — PATIENT INSTRUCTIONS
Sleep compression between 11 PM and 7:30 AM, no naps. Will replace the Trazodone with Doxepin next visit as needed.          Orders Placed This Encounter   Procedures    Baseline Diagnostic Sleep Study     Standing Status:   Future     Standing Expiration Date:   12/12/2024     Order Specific Question:   Adult or Pediatric     Answer:   Adult Study (>7 Years)     Order Specific Question:   Location For Sleep Study     Answer:   [facility]     Order Specific Question:   Select Sleep Lab Location     Answer:   [facility]    Sleep Study with PAP Titration     Standing Status:   Future     Standing Expiration Date:   12/12/2024     Order Specific Question:   Sleep Study Titration Type     Answer:   CPAP     Order Specific Question:   Location For Sleep Study     Answer:   [facility]     Order Specific Question:   Select Sleep Lab Location     Answer:   [facility]

## 2023-12-12 NOTE — PROGRESS NOTES
Assessment:   Obstructive sleep apnea especially daytime sleepiness, insomnia,  Mallampati class of 4 and obesity. Diagnosis Orders   1. MARTÍNEZ (obstructive sleep apnea)  Baseline Diagnostic Sleep Study    Sleep Study with PAP Titration      2. Bilateral leg edema        3. Gastroesophageal reflux disease, unspecified whether esophagitis present        4. Primary hypertension        5. Snoring        6. Insomnia, psychophysiological          Plan:   Sleep compression between 11 PM and 7:30 AM, no naps. Will replace the Trazodone with Doxepin next visit as needed. Patient was counseled about the pathophysiology of obstructive sleep apnea syndrome and the methods for evaluating its presence and severity. Patient was counseled to avoid driving and other potentially hazardous circumstances if the patient is experiencing excessive sleepiness. Treatment considerations include the use of nasal CPAP, inspire,  which should be based on otolarygologic findings, In the meantime, the patient should be cautioned to avoid the use of alcohol or other depressant medications because of potential for increasing the duration and severity of apnea and cautioned regarding driving or operating and dangerous equipment if the patient is experiencing daytime sleepiness. .  Most likely treating the MARTÍNEZ will have positive impact on HTN, leg edema and GERD control. Orders Placed This Encounter   Procedures    Baseline Diagnostic Sleep Study    Sleep Study with PAP Titration       No follow-ups on file.     Samantha Treviño MD  Medical Director - [facility]

## 2023-12-13 ENCOUNTER — TELEPHONE (OUTPATIENT)
Dept: INTERNAL MEDICINE CLINIC | Age: 78
End: 2023-12-13

## 2023-12-13 DIAGNOSIS — R41.3 MEMORY LOSS: ICD-10-CM

## 2023-12-13 DIAGNOSIS — R26.89 IMPAIRMENT OF BALANCE: Primary | ICD-10-CM

## 2023-12-13 NOTE — TELEPHONE ENCOUNTER
Can we please reorder the CT head  wo contrast as a routine,  kiera valdivia called, can you please put this in epic.      Thank you

## 2023-12-25 DIAGNOSIS — R26.89 IMPAIRMENT OF BALANCE: Primary | ICD-10-CM

## 2024-01-08 DIAGNOSIS — J45.20 MILD INTERMITTENT ASTHMA WITHOUT COMPLICATION: ICD-10-CM

## 2024-01-09 RX ORDER — ALBUTEROL SULFATE 2.5 MG/3ML
2.5 SOLUTION RESPIRATORY (INHALATION) EVERY 6 HOURS PRN
Qty: 120 EACH | Refills: 2 | Status: SHIPPED | OUTPATIENT
Start: 2024-01-09

## 2024-01-09 RX ORDER — ALBUTEROL SULFATE 90 UG/1
1 AEROSOL, METERED RESPIRATORY (INHALATION) EVERY 4 HOURS PRN
Qty: 1 EACH | Refills: 2 | Status: SHIPPED | OUTPATIENT
Start: 2024-01-09

## 2024-01-09 NOTE — TELEPHONE ENCOUNTER
Last office visit 11/9/2023       Next office visit scheduled Visit date not found    Requested Prescriptions     Pending Prescriptions Disp Refills    albuterol (PROVENTIL) (2.5 MG/3ML) 0.083% nebulizer solution 120 each 2     Sig: Take 3 mLs by nebulization every 6 hours as needed for Wheezing    albuterol sulfate HFA (PROVENTIL;VENTOLIN;PROAIR) 108 (90 Base) MCG/ACT inhaler 1 each 2     Sig: Inhale 1 puff into the lungs every 4 hours as needed for Wheezing or Shortness of Breath

## 2024-01-11 ENCOUNTER — HOSPITAL ENCOUNTER (OUTPATIENT)
Dept: SLEEP CENTER | Age: 79
Discharge: HOME | End: 2024-01-11
Attending: PSYCHIATRY & NEUROLOGY
Payer: MEDICARE

## 2024-01-11 PROCEDURE — 95810 POLYSOM 6/> YRS 4/> PARAM: CPT

## 2024-01-12 PROBLEM — G47.33 OSA (OBSTRUCTIVE SLEEP APNEA): Status: ACTIVE | Noted: 2024-01-12

## 2024-01-15 ENCOUNTER — TELEPHONE (OUTPATIENT)
Dept: PULMONOLOGY | Age: 79
End: 2024-01-15

## 2024-01-15 NOTE — TELEPHONE ENCOUNTER
Sleep study showed mild MARTÍNEZ.  AHI was 8.9  per hr. And O2 Desaturations to 86%.   Recommends titration.    Pt notified of sleep results  She states she will decline CPAP titration at this time  Has an appt with Dr Ferguson and will discuss further options--? meds

## 2024-01-16 SDOH — HEALTH STABILITY: PHYSICAL HEALTH: ON AVERAGE, HOW MANY DAYS PER WEEK DO YOU ENGAGE IN MODERATE TO STRENUOUS EXERCISE (LIKE A BRISK WALK)?: 3 DAYS

## 2024-01-16 SDOH — HEALTH STABILITY: PHYSICAL HEALTH: ON AVERAGE, HOW MANY MINUTES DO YOU ENGAGE IN EXERCISE AT THIS LEVEL?: 20 MIN

## 2024-01-16 ASSESSMENT — PATIENT HEALTH QUESTIONNAIRE - PHQ9
SUM OF ALL RESPONSES TO PHQ QUESTIONS 1-9: 0
2. FEELING DOWN, DEPRESSED OR HOPELESS: 0
1. LITTLE INTEREST OR PLEASURE IN DOING THINGS: 0
SUM OF ALL RESPONSES TO PHQ9 QUESTIONS 1 & 2: 0

## 2024-01-16 ASSESSMENT — LIFESTYLE VARIABLES
HOW OFTEN DO YOU HAVE SIX OR MORE DRINKS ON ONE OCCASION: 1
HOW OFTEN DO YOU HAVE A DRINK CONTAINING ALCOHOL: NEVER
HOW MANY STANDARD DRINKS CONTAINING ALCOHOL DO YOU HAVE ON A TYPICAL DAY: 0
HOW OFTEN DO YOU HAVE A DRINK CONTAINING ALCOHOL: 1
HOW MANY STANDARD DRINKS CONTAINING ALCOHOL DO YOU HAVE ON A TYPICAL DAY: PATIENT DOES NOT DRINK

## 2024-01-18 ENCOUNTER — TELEMEDICINE (OUTPATIENT)
Dept: INTERNAL MEDICINE CLINIC | Age: 79
End: 2024-01-18
Payer: MEDICARE

## 2024-01-18 DIAGNOSIS — Z00.00 MEDICARE ANNUAL WELLNESS VISIT, SUBSEQUENT: Primary | ICD-10-CM

## 2024-01-18 PROCEDURE — 1124F ACP DISCUSS-NO DSCNMKR DOCD: CPT | Performed by: NURSE PRACTITIONER

## 2024-01-18 PROCEDURE — G0439 PPPS, SUBSEQ VISIT: HCPCS | Performed by: NURSE PRACTITIONER

## 2024-01-18 ASSESSMENT — PATIENT HEALTH QUESTIONNAIRE - PHQ9
SUM OF ALL RESPONSES TO PHQ QUESTIONS 1-9: 0
5. POOR APPETITE OR OVEREATING: 0
1. LITTLE INTEREST OR PLEASURE IN DOING THINGS: 0
SUM OF ALL RESPONSES TO PHQ QUESTIONS 1-9: 0
SUM OF ALL RESPONSES TO PHQ QUESTIONS 1-9: 0
2. FEELING DOWN, DEPRESSED OR HOPELESS: 0
10. IF YOU CHECKED OFF ANY PROBLEMS, HOW DIFFICULT HAVE THESE PROBLEMS MADE IT FOR YOU TO DO YOUR WORK, TAKE CARE OF THINGS AT HOME, OR GET ALONG WITH OTHER PEOPLE: 0
SUM OF ALL RESPONSES TO PHQ9 QUESTIONS 1 & 2: 0
7. TROUBLE CONCENTRATING ON THINGS, SUCH AS READING THE NEWSPAPER OR WATCHING TELEVISION: 0
8. MOVING OR SPEAKING SO SLOWLY THAT OTHER PEOPLE COULD HAVE NOTICED. OR THE OPPOSITE, BEING SO FIGETY OR RESTLESS THAT YOU HAVE BEEN MOVING AROUND A LOT MORE THAN USUAL: 0
4. FEELING TIRED OR HAVING LITTLE ENERGY: 0
6. FEELING BAD ABOUT YOURSELF - OR THAT YOU ARE A FAILURE OR HAVE LET YOURSELF OR YOUR FAMILY DOWN: 0
SUM OF ALL RESPONSES TO PHQ QUESTIONS 1-9: 0
3. TROUBLE FALLING OR STAYING ASLEEP: 0
9. THOUGHTS THAT YOU WOULD BE BETTER OFF DEAD, OR OF HURTING YOURSELF: 0

## 2024-01-18 NOTE — PROGRESS NOTES
a caregiver was present when appropriate.   The patient was located at Home: [address] [place] [address]  Provider was located at Facility (Appt Dept): [address] [address]

## 2024-01-19 ENCOUNTER — HOSPITAL ENCOUNTER (OUTPATIENT)
Dept: MRI IMAGING | Age: 79
Discharge: HOME | End: 2024-01-19
Attending: STUDENT IN AN ORGANIZED HEALTH CARE EDUCATION/TRAINING PROGRAM
Payer: MEDICARE

## 2024-01-19 DIAGNOSIS — R26.89 IMPAIRMENT OF BALANCE: ICD-10-CM

## 2024-01-19 PROCEDURE — 70551 MRI BRAIN STEM W/O DYE: CPT

## 2024-01-23 ENCOUNTER — TELEPHONE (OUTPATIENT)
Dept: INTERNAL MEDICINE CLINIC | Age: 79
End: 2024-01-23

## 2024-01-23 DIAGNOSIS — R90.89 ABNORMAL BRAIN MRI: Primary | ICD-10-CM

## 2024-01-23 DIAGNOSIS — R26.89 IMPAIRMENT OF BALANCE: ICD-10-CM

## 2024-01-23 NOTE — TELEPHONE ENCOUNTER
Spoke to patient re all and also sent her a My Chart message with phone number to central sched, per her request.

## 2024-02-15 ENCOUNTER — HOSPITAL ENCOUNTER (OUTPATIENT)
Dept: MRI IMAGING | Age: 79
Discharge: HOME | End: 2024-02-15
Payer: MEDICARE

## 2024-02-15 DIAGNOSIS — R26.89 IMPAIRMENT OF BALANCE: ICD-10-CM

## 2024-02-15 DIAGNOSIS — R90.89 ABNORMAL BRAIN MRI: ICD-10-CM

## 2024-02-15 LAB
PERFORMED ON: NORMAL
POC CREATININE: 0.8 MG/DL (ref 0.6–1.2)
POC SAMPLE TYPE: NORMAL

## 2024-02-15 PROCEDURE — 70553 MRI BRAIN STEM W/O & W/DYE: CPT

## 2024-02-15 PROCEDURE — 6360000004 HC RX CONTRAST MEDICATION: Performed by: NURSE PRACTITIONER

## 2024-02-15 PROCEDURE — A9579 GAD-BASE MR CONTRAST NOS,1ML: HCPCS | Performed by: NURSE PRACTITIONER

## 2024-02-15 PROCEDURE — 82565 ASSAY OF CREATININE: CPT

## 2024-02-15 RX ADMIN — GADOTERIDOL 13 ML: 279.3 INJECTION, SOLUTION INTRAVENOUS at 18:14

## 2024-02-19 DIAGNOSIS — E78.5 HYPERLIPIDEMIA, UNSPECIFIED HYPERLIPIDEMIA TYPE: Primary | ICD-10-CM

## 2024-02-19 RX ORDER — SIMVASTATIN 40 MG
40 TABLET ORAL NIGHTLY
Qty: 90 TABLET | Refills: 0 | Status: SHIPPED | OUTPATIENT
Start: 2024-02-19

## 2024-02-20 DIAGNOSIS — R26.89 IMPAIRMENT OF BALANCE: Primary | ICD-10-CM

## 2024-02-20 DIAGNOSIS — R41.3 MEMORY LOSS: ICD-10-CM

## 2024-02-29 DIAGNOSIS — R26.89 IMPAIRMENT OF BALANCE: Primary | ICD-10-CM

## 2024-03-01 DIAGNOSIS — F32.A DEPRESSION, UNSPECIFIED DEPRESSION TYPE: ICD-10-CM

## 2024-03-01 RX ORDER — SERTRALINE HYDROCHLORIDE 100 MG/1
100 TABLET, FILM COATED ORAL DAILY
Qty: 90 TABLET | Refills: 1 | Status: SHIPPED | OUTPATIENT
Start: 2024-03-01

## 2024-03-07 ENCOUNTER — TELEPHONE (OUTPATIENT)
Dept: INTERNAL MEDICINE CLINIC | Age: 79
End: 2024-03-07

## 2024-03-07 NOTE — TELEPHONE ENCOUNTER
Teresa from [facility] called, can you send the order for her balance therapy to [phone](fax)    Thank you     [phone]

## 2024-03-28 ENCOUNTER — APPOINTMENT (OUTPATIENT)
Dept: CT IMAGING | Age: 79
End: 2024-03-28
Payer: MEDICARE

## 2024-03-28 ENCOUNTER — HOSPITAL ENCOUNTER (OUTPATIENT)
Age: 79
Setting detail: OBSERVATION
Discharge: HOME | End: 2024-03-30
Attending: EMERGENCY MEDICINE | Admitting: FAMILY MEDICINE
Payer: MEDICARE

## 2024-03-28 ENCOUNTER — APPOINTMENT (OUTPATIENT)
Dept: GENERAL RADIOLOGY | Age: 79
End: 2024-03-28
Payer: MEDICARE

## 2024-03-28 DIAGNOSIS — N17.9 ACUTE RENAL FAILURE SUPERIMPOSED ON CHRONIC KIDNEY DISEASE, UNSPECIFIED ACUTE RENAL FAILURE TYPE, UNSPECIFIED CKD STAGE (HCC): Primary | ICD-10-CM

## 2024-03-28 DIAGNOSIS — R82.81 PYURIA: ICD-10-CM

## 2024-03-28 DIAGNOSIS — N18.9 ACUTE RENAL FAILURE SUPERIMPOSED ON CHRONIC KIDNEY DISEASE, UNSPECIFIED ACUTE RENAL FAILURE TYPE, UNSPECIFIED CKD STAGE (HCC): Primary | ICD-10-CM

## 2024-03-28 DIAGNOSIS — R00.1 BRADYCARDIA: ICD-10-CM

## 2024-03-28 LAB
ALBUMIN SERPL-MCNC: 4.2 G/DL (ref 3.4–5)
ALBUMIN/GLOB SERPL: 2.6 {RATIO} (ref 1.1–2.2)
ALP SERPL-CCNC: 75 U/L (ref 40–129)
ALT SERPL-CCNC: 15 U/L (ref 10–40)
ANION GAP SERPL CALCULATED.3IONS-SCNC: 5 MMOL/L (ref 3–16)
AST SERPL-CCNC: 23 U/L (ref 15–37)
BACTERIA URNS QL MICRO: ABNORMAL /HPF
BASOPHILS # BLD: 0 K/UL (ref 0–0.2)
BASOPHILS NFR BLD: 0.8 %
BILIRUB SERPL-MCNC: 0.5 MG/DL (ref 0–1)
BILIRUB UR QL STRIP.AUTO: NEGATIVE
BUN SERPL-MCNC: 22 MG/DL (ref 7–20)
CALCIUM SERPL-MCNC: 10.7 MG/DL (ref 8.3–10.6)
CHARACTER UR: ABNORMAL
CHLORIDE SERPL-SCNC: 104 MMOL/L (ref 99–110)
CLARITY UR: CLEAR
CO2 SERPL-SCNC: 30 MMOL/L (ref 21–32)
COLOR UR: YELLOW
CREAT SERPL-MCNC: 1.6 MG/DL (ref 0.6–1.2)
DEPRECATED RDW RBC AUTO: 14.3 % (ref 12.4–15.4)
EOSINOPHIL # BLD: 0.1 K/UL (ref 0–0.6)
EOSINOPHIL NFR BLD: 2.8 %
EPI CELLS #/AREA URNS AUTO: 1 /HPF (ref 0–5)
ETHANOLAMINE SERPL-MCNC: NORMAL MG/DL (ref 0–0.08)
FLUAV RNA UPPER RESP QL NAA+PROBE: NEGATIVE
FLUBV AG NPH QL: NEGATIVE
GFR SERPLBLD CREATININE-BSD FMLA CKD-EPI: 32 ML/MIN/{1.73_M2}
GLUCOSE SERPL-MCNC: 94 MG/DL (ref 70–99)
GLUCOSE UR STRIP.AUTO-MCNC: NEGATIVE MG/DL
HCT VFR BLD AUTO: 41.2 % (ref 36–48)
HGB BLD-MCNC: 14.3 G/DL (ref 12–16)
HGB UR QL STRIP.AUTO: NEGATIVE
HYALINE CASTS #/AREA URNS LPF: ABNORMAL /LPF (ref 0–2)
KETONES UR STRIP.AUTO-MCNC: NEGATIVE MG/DL
LEUKOCYTE ESTERASE UR QL STRIP.AUTO: ABNORMAL
LYMPHOCYTES # BLD: 0.8 K/UL (ref 1–5.1)
LYMPHOCYTES NFR BLD: 18.8 %
MAGNESIUM SERPL-MCNC: 2.1 MG/DL (ref 1.8–2.4)
MCH RBC QN AUTO: 30.8 PG (ref 26–34)
MCHC RBC AUTO-ENTMCNC: 34.8 G/DL (ref 31–36)
MCV RBC AUTO: 88.5 FL (ref 80–100)
MONOCYTES # BLD: 0.3 K/UL (ref 0–1.3)
MONOCYTES NFR BLD: 7.1 %
MUCOUS THREADS #/AREA URNS LPF: ABNORMAL /LPF
NEUTROPHILS # BLD: 2.9 K/UL (ref 1.7–7.7)
NEUTROPHILS NFR BLD: 70.5 %
NITRITE UR QL STRIP.AUTO: NEGATIVE
PH UR STRIP.AUTO: 6 [PH] (ref 5–8)
PLATELET # BLD AUTO: 96 K/UL (ref 135–450)
PLATELET BLD QL SMEAR: ABNORMAL
PMV BLD AUTO: 7.6 FL (ref 5–10.5)
POTASSIUM SERPL-SCNC: 4.3 MMOL/L (ref 3.5–5.1)
PROT SERPL-MCNC: 5.8 G/DL (ref 6.4–8.2)
PROT UR STRIP.AUTO-MCNC: NEGATIVE MG/DL
RBC # BLD AUTO: 4.65 M/UL (ref 4–5.2)
RBC CLUMPS #/AREA URNS AUTO: 2 /HPF (ref 0–4)
RBC MORPH BLD: NORMAL
RENAL EPI CELLS #/AREA UR COMP ASSIST: ABNORMAL /HPF (ref 0–1)
SARS-COV-2 RDRP RESP QL NAA+PROBE: NOT DETECTED
SLIDE REVIEW: ABNORMAL
SODIUM SERPL-SCNC: 139 MMOL/L (ref 136–145)
SP GR UR STRIP.AUTO: 1.01 (ref 1–1.03)
TROPONIN, HIGH SENSITIVITY: 11 NG/L (ref 0–14)
TROPONIN, HIGH SENSITIVITY: 11 NG/L (ref 0–14)
UA COMPLETE W REFLEX CULTURE PNL UR: ABNORMAL
UA DIPSTICK W REFLEX MICRO PNL UR: YES
URN SPEC COLLECT METH UR: ABNORMAL
UROBILINOGEN UR STRIP-ACNC: 0.2 E.U./DL
WBC # BLD AUTO: 4.2 K/UL (ref 4–11)
WBC #/AREA URNS AUTO: 7 /HPF (ref 0–5)

## 2024-03-28 PROCEDURE — G0378 HOSPITAL OBSERVATION PER HR: HCPCS

## 2024-03-28 PROCEDURE — 82077 ASSAY SPEC XCP UR&BREATH IA: CPT

## 2024-03-28 PROCEDURE — 96361 HYDRATE IV INFUSION ADD-ON: CPT

## 2024-03-28 PROCEDURE — 83735 ASSAY OF MAGNESIUM: CPT

## 2024-03-28 PROCEDURE — 2580000003 HC RX 258: Performed by: PHYSICIAN ASSISTANT

## 2024-03-28 PROCEDURE — 6370000000 HC RX 637 (ALT 250 FOR IP): Performed by: FAMILY MEDICINE

## 2024-03-28 PROCEDURE — 96374 THER/PROPH/DIAG INJ IV PUSH: CPT

## 2024-03-28 PROCEDURE — 6360000002 HC RX W HCPCS: Performed by: NURSE PRACTITIONER

## 2024-03-28 PROCEDURE — 2580000003 HC RX 258: Performed by: EMERGENCY MEDICINE

## 2024-03-28 PROCEDURE — 87804 INFLUENZA ASSAY W/OPTIC: CPT

## 2024-03-28 PROCEDURE — 93005 ELECTROCARDIOGRAM TRACING: CPT | Performed by: EMERGENCY MEDICINE

## 2024-03-28 PROCEDURE — 36415 COLL VENOUS BLD VENIPUNCTURE: CPT

## 2024-03-28 PROCEDURE — 2580000003 HC RX 258: Performed by: FAMILY MEDICINE

## 2024-03-28 PROCEDURE — 70450 CT HEAD/BRAIN W/O DYE: CPT

## 2024-03-28 PROCEDURE — 87086 URINE CULTURE/COLONY COUNT: CPT

## 2024-03-28 PROCEDURE — 87635 SARS-COV-2 COVID-19 AMP PRB: CPT

## 2024-03-28 PROCEDURE — 80053 COMPREHEN METABOLIC PANEL: CPT

## 2024-03-28 PROCEDURE — 94761 N-INVAS EAR/PLS OXIMETRY MLT: CPT

## 2024-03-28 PROCEDURE — 94640 AIRWAY INHALATION TREATMENT: CPT

## 2024-03-28 PROCEDURE — 84484 ASSAY OF TROPONIN QUANT: CPT

## 2024-03-28 PROCEDURE — 99285 EMERGENCY DEPT VISIT HI MDM: CPT

## 2024-03-28 PROCEDURE — 81001 URINALYSIS AUTO W/SCOPE: CPT

## 2024-03-28 PROCEDURE — 85025 COMPLETE CBC W/AUTO DIFF WBC: CPT

## 2024-03-28 PROCEDURE — 71045 X-RAY EXAM CHEST 1 VIEW: CPT

## 2024-03-28 PROCEDURE — 6360000002 HC RX W HCPCS: Performed by: EMERGENCY MEDICINE

## 2024-03-28 RX ORDER — ACETAMINOPHEN 325 MG/1
650 TABLET ORAL EVERY 6 HOURS PRN
Status: DISCONTINUED | OUTPATIENT
Start: 2024-03-28 | End: 2024-03-30 | Stop reason: HOSPADM

## 2024-03-28 RX ORDER — TRAZODONE HYDROCHLORIDE 100 MG/1
100 TABLET ORAL NIGHTLY PRN
Status: DISCONTINUED | OUTPATIENT
Start: 2024-03-28 | End: 2024-03-30 | Stop reason: HOSPADM

## 2024-03-28 RX ORDER — ONDANSETRON 4 MG/1
4 TABLET, ORALLY DISINTEGRATING ORAL EVERY 8 HOURS PRN
Status: DISCONTINUED | OUTPATIENT
Start: 2024-03-28 | End: 2024-03-30 | Stop reason: HOSPADM

## 2024-03-28 RX ORDER — LOSARTAN POTASSIUM 50 MG/1
50 TABLET ORAL DAILY
Status: DISCONTINUED | OUTPATIENT
Start: 2024-03-29 | End: 2024-03-30

## 2024-03-28 RX ORDER — ENOXAPARIN SODIUM 100 MG/ML
40 INJECTION SUBCUTANEOUS DAILY
Status: DISCONTINUED | OUTPATIENT
Start: 2024-03-29 | End: 2024-03-30 | Stop reason: HOSPADM

## 2024-03-28 RX ORDER — SODIUM CHLORIDE, SODIUM LACTATE, POTASSIUM CHLORIDE, CALCIUM CHLORIDE 600; 310; 30; 20 MG/100ML; MG/100ML; MG/100ML; MG/100ML
INJECTION, SOLUTION INTRAVENOUS CONTINUOUS
Status: DISCONTINUED | OUTPATIENT
Start: 2024-03-28 | End: 2024-03-29

## 2024-03-28 RX ORDER — CETIRIZINE HYDROCHLORIDE 10 MG/1
10 TABLET ORAL DAILY
Status: DISCONTINUED | OUTPATIENT
Start: 2024-03-29 | End: 2024-03-30 | Stop reason: HOSPADM

## 2024-03-28 RX ORDER — HYDROXYCHLOROQUINE SULFATE 200 MG/1
200 TABLET, FILM COATED ORAL DAILY
Status: DISCONTINUED | OUTPATIENT
Start: 2024-03-29 | End: 2024-03-30 | Stop reason: HOSPADM

## 2024-03-28 RX ORDER — AMLODIPINE BESYLATE 5 MG/1
5 TABLET ORAL DAILY
Status: DISCONTINUED | OUTPATIENT
Start: 2024-03-29 | End: 2024-03-30

## 2024-03-28 RX ORDER — ACETAMINOPHEN 650 MG/1
650 SUPPOSITORY RECTAL EVERY 6 HOURS PRN
Status: DISCONTINUED | OUTPATIENT
Start: 2024-03-28 | End: 2024-03-30 | Stop reason: HOSPADM

## 2024-03-28 RX ORDER — ALBUTEROL SULFATE 2.5 MG/3ML
2.5 SOLUTION RESPIRATORY (INHALATION) EVERY 6 HOURS PRN
Status: DISCONTINUED | OUTPATIENT
Start: 2024-03-28 | End: 2024-03-30 | Stop reason: HOSPADM

## 2024-03-28 RX ORDER — PANTOPRAZOLE SODIUM 20 MG/1
20 TABLET, DELAYED RELEASE ORAL
Status: DISCONTINUED | OUTPATIENT
Start: 2024-03-29 | End: 2024-03-30 | Stop reason: HOSPADM

## 2024-03-28 RX ORDER — FUROSEMIDE 20 MG/1
20 TABLET ORAL DAILY
Status: DISCONTINUED | OUTPATIENT
Start: 2024-03-29 | End: 2024-03-30 | Stop reason: HOSPADM

## 2024-03-28 RX ORDER — SODIUM CHLORIDE 0.9 % (FLUSH) 0.9 %
5-40 SYRINGE (ML) INJECTION EVERY 12 HOURS SCHEDULED
Status: DISCONTINUED | OUTPATIENT
Start: 2024-03-28 | End: 2024-03-30 | Stop reason: HOSPADM

## 2024-03-28 RX ORDER — 0.9 % SODIUM CHLORIDE 0.9 %
1000 INTRAVENOUS SOLUTION INTRAVENOUS ONCE
Status: COMPLETED | OUTPATIENT
Start: 2024-03-28 | End: 2024-03-28

## 2024-03-28 RX ORDER — LANOLIN ALCOHOL/MO/W.PET/CERES
3 CREAM (GRAM) TOPICAL NIGHTLY PRN
Status: DISCONTINUED | OUTPATIENT
Start: 2024-03-28 | End: 2024-03-30 | Stop reason: HOSPADM

## 2024-03-28 RX ORDER — OXYBUTYNIN CHLORIDE 10 MG/1
10 TABLET, EXTENDED RELEASE ORAL DAILY
COMMUNITY

## 2024-03-28 RX ORDER — HYDRALAZINE HYDROCHLORIDE 25 MG/1
25 TABLET, FILM COATED ORAL 2 TIMES DAILY
Status: DISCONTINUED | OUTPATIENT
Start: 2024-03-28 | End: 2024-03-29

## 2024-03-28 RX ORDER — ONDANSETRON 2 MG/ML
4 INJECTION INTRAMUSCULAR; INTRAVENOUS EVERY 6 HOURS PRN
Status: DISCONTINUED | OUTPATIENT
Start: 2024-03-28 | End: 2024-03-30 | Stop reason: HOSPADM

## 2024-03-28 RX ORDER — SODIUM CHLORIDE 0.9 % (FLUSH) 0.9 %
5-40 SYRINGE (ML) INJECTION PRN
Status: DISCONTINUED | OUTPATIENT
Start: 2024-03-28 | End: 2024-03-30 | Stop reason: HOSPADM

## 2024-03-28 RX ORDER — ATORVASTATIN CALCIUM 20 MG/1
20 TABLET, FILM COATED ORAL DAILY
Status: DISCONTINUED | OUTPATIENT
Start: 2024-03-29 | End: 2024-03-30 | Stop reason: HOSPADM

## 2024-03-28 RX ORDER — POLYETHYLENE GLYCOL 3350 17 G/17G
17 POWDER, FOR SOLUTION ORAL DAILY PRN
Status: DISCONTINUED | OUTPATIENT
Start: 2024-03-28 | End: 2024-03-30 | Stop reason: HOSPADM

## 2024-03-28 RX ORDER — CLONIDINE HYDROCHLORIDE 0.1 MG/1
0.1 TABLET ORAL 3 TIMES DAILY
Status: DISCONTINUED | OUTPATIENT
Start: 2024-03-28 | End: 2024-03-29

## 2024-03-28 RX ORDER — FELODIPINE 5 MG/1
5 TABLET, EXTENDED RELEASE ORAL DAILY
COMMUNITY

## 2024-03-28 RX ORDER — M-VIT,TX,IRON,MINS/CALC/FOLIC 27MG-0.4MG
1 TABLET ORAL DAILY
COMMUNITY

## 2024-03-28 RX ORDER — SODIUM CHLORIDE 9 MG/ML
INJECTION, SOLUTION INTRAVENOUS PRN
Status: DISCONTINUED | OUTPATIENT
Start: 2024-03-28 | End: 2024-03-30 | Stop reason: HOSPADM

## 2024-03-28 RX ORDER — SERTRALINE HYDROCHLORIDE 100 MG/1
100 TABLET, FILM COATED ORAL DAILY
Status: DISCONTINUED | OUTPATIENT
Start: 2024-03-29 | End: 2024-03-30 | Stop reason: HOSPADM

## 2024-03-28 RX ADMIN — CLONIDINE HYDROCHLORIDE 0.1 MG: 0.1 TABLET ORAL at 23:25

## 2024-03-28 RX ADMIN — ALBUTEROL SULFATE 2.5 MG: 2.5 SOLUTION RESPIRATORY (INHALATION) at 23:48

## 2024-03-28 RX ADMIN — SODIUM CHLORIDE 1000 ML: 9 INJECTION, SOLUTION INTRAVENOUS at 18:39

## 2024-03-28 RX ADMIN — SODIUM CHLORIDE, PRESERVATIVE FREE 10 ML: 5 INJECTION INTRAVENOUS at 23:26

## 2024-03-28 RX ADMIN — SODIUM CHLORIDE, POTASSIUM CHLORIDE, SODIUM LACTATE AND CALCIUM CHLORIDE: 600; 310; 30; 20 INJECTION, SOLUTION INTRAVENOUS at 23:22

## 2024-03-28 RX ADMIN — WATER 1000 MG: 1 INJECTION INTRAMUSCULAR; INTRAVENOUS; SUBCUTANEOUS at 19:34

## 2024-03-28 RX ADMIN — HYDRALAZINE HYDROCHLORIDE 25 MG: 25 TABLET ORAL at 23:25

## 2024-03-28 RX ADMIN — TRAZODONE HYDROCHLORIDE 100 MG: 100 TABLET ORAL at 23:25

## 2024-03-28 ASSESSMENT — PAIN - FUNCTIONAL ASSESSMENT: PAIN_FUNCTIONAL_ASSESSMENT: NONE - DENIES PAIN

## 2024-03-28 ASSESSMENT — LIFESTYLE VARIABLES
HOW MANY STANDARD DRINKS CONTAINING ALCOHOL DO YOU HAVE ON A TYPICAL DAY: PATIENT DOES NOT DRINK
HOW OFTEN DO YOU HAVE A DRINK CONTAINING ALCOHOL: NEVER

## 2024-03-28 NOTE — ED TRIAGE NOTES
Pt into ER via EMS with c/c Dizziness, sudden onset, and lowered herself to ground.  EMS found pt with HR at 40.  EMS gave 0.5 mg Atropine x2.  Pt into ER with HR at 60.  Pt denies any CP, or SOB.

## 2024-03-29 LAB
ANION GAP SERPL CALCULATED.3IONS-SCNC: 6 MMOL/L (ref 3–16)
BACTERIA UR CULT: NORMAL
BASOPHILS # BLD: 0 K/UL (ref 0–0.2)
BASOPHILS NFR BLD: 0.6 %
BUN SERPL-MCNC: 19 MG/DL (ref 7–20)
CALCIUM SERPL-MCNC: 10.6 MG/DL (ref 8.3–10.6)
CHLORIDE SERPL-SCNC: 106 MMOL/L (ref 99–110)
CO2 SERPL-SCNC: 27 MMOL/L (ref 21–32)
CREAT SERPL-MCNC: 1.3 MG/DL (ref 0.6–1.2)
DEPRECATED RDW RBC AUTO: 13.9 % (ref 12.4–15.4)
EKG ATRIAL RATE: 58 BPM
EKG DIAGNOSIS: NORMAL
EKG P AXIS: 45 DEGREES
EKG P-R INTERVAL: 140 MS
EKG Q-T INTERVAL: 474 MS
EKG QRS DURATION: 80 MS
EKG QTC CALCULATION (BAZETT): 465 MS
EKG R AXIS: -46 DEGREES
EKG T AXIS: 17 DEGREES
EKG VENTRICULAR RATE: 58 BPM
EOSINOPHIL # BLD: 0.1 K/UL (ref 0–0.6)
EOSINOPHIL NFR BLD: 2.5 %
GFR SERPLBLD CREATININE-BSD FMLA CKD-EPI: 42 ML/MIN/{1.73_M2}
GLUCOSE SERPL-MCNC: 101 MG/DL (ref 70–99)
HCT VFR BLD AUTO: 38.8 % (ref 36–48)
HGB BLD-MCNC: 13.7 G/DL (ref 12–16)
LYMPHOCYTES # BLD: 0.7 K/UL (ref 1–5.1)
LYMPHOCYTES NFR BLD: 16.8 %
MCH RBC QN AUTO: 30.8 PG (ref 26–34)
MCHC RBC AUTO-ENTMCNC: 35.3 G/DL (ref 31–36)
MCV RBC AUTO: 87.4 FL (ref 80–100)
MONOCYTES # BLD: 0.3 K/UL (ref 0–1.3)
MONOCYTES NFR BLD: 6.5 %
NEUTROPHILS # BLD: 3.2 K/UL (ref 1.7–7.7)
NEUTROPHILS NFR BLD: 73.6 %
PLATELET # BLD AUTO: 101 K/UL (ref 135–450)
PMV BLD AUTO: 8.1 FL (ref 5–10.5)
POTASSIUM SERPL-SCNC: 3.9 MMOL/L (ref 3.5–5.1)
RBC # BLD AUTO: 4.44 M/UL (ref 4–5.2)
SODIUM SERPL-SCNC: 139 MMOL/L (ref 136–145)
WBC # BLD AUTO: 4.3 K/UL (ref 4–11)

## 2024-03-29 PROCEDURE — 85025 COMPLETE CBC W/AUTO DIFF WBC: CPT

## 2024-03-29 PROCEDURE — 93306 TTE W/DOPPLER COMPLETE: CPT

## 2024-03-29 PROCEDURE — 2580000003 HC RX 258: Performed by: FAMILY MEDICINE

## 2024-03-29 PROCEDURE — 6370000000 HC RX 637 (ALT 250 FOR IP): Performed by: FAMILY MEDICINE

## 2024-03-29 PROCEDURE — 36415 COLL VENOUS BLD VENIPUNCTURE: CPT

## 2024-03-29 PROCEDURE — 96361 HYDRATE IV INFUSION ADD-ON: CPT

## 2024-03-29 PROCEDURE — 96372 THER/PROPH/DIAG INJ SC/IM: CPT

## 2024-03-29 PROCEDURE — 6370000000 HC RX 637 (ALT 250 FOR IP): Performed by: HOSPITALIST

## 2024-03-29 PROCEDURE — 97165 OT EVAL LOW COMPLEX 30 MIN: CPT

## 2024-03-29 PROCEDURE — 97161 PT EVAL LOW COMPLEX 20 MIN: CPT

## 2024-03-29 PROCEDURE — G0378 HOSPITAL OBSERVATION PER HR: HCPCS

## 2024-03-29 PROCEDURE — 6360000002 HC RX W HCPCS: Performed by: FAMILY MEDICINE

## 2024-03-29 PROCEDURE — 93010 ELECTROCARDIOGRAM REPORT: CPT | Performed by: INTERNAL MEDICINE

## 2024-03-29 PROCEDURE — 80048 BASIC METABOLIC PNL TOTAL CA: CPT

## 2024-03-29 RX ORDER — HYDRALAZINE HYDROCHLORIDE 50 MG/1
50 TABLET, FILM COATED ORAL 2 TIMES DAILY
Qty: 90 TABLET | Refills: 3 | Status: SHIPPED | OUTPATIENT
Start: 2024-03-29

## 2024-03-29 RX ORDER — CLONIDINE HYDROCHLORIDE 0.1 MG/1
0.1 TABLET ORAL 2 TIMES DAILY
Qty: 60 TABLET | Refills: 3 | Status: SHIPPED | OUTPATIENT
Start: 2024-03-29

## 2024-03-29 RX ORDER — HYDRALAZINE HYDROCHLORIDE 50 MG/1
50 TABLET, FILM COATED ORAL 2 TIMES DAILY
Status: DISCONTINUED | OUTPATIENT
Start: 2024-03-29 | End: 2024-03-30 | Stop reason: HOSPADM

## 2024-03-29 RX ORDER — HYDRALAZINE HYDROCHLORIDE 25 MG/1
25 TABLET, FILM COATED ORAL ONCE
Status: COMPLETED | OUTPATIENT
Start: 2024-03-29 | End: 2024-03-29

## 2024-03-29 RX ORDER — CLONIDINE HYDROCHLORIDE 0.1 MG/1
0.1 TABLET ORAL 2 TIMES DAILY
Status: DISCONTINUED | OUTPATIENT
Start: 2024-03-29 | End: 2024-03-30 | Stop reason: HOSPADM

## 2024-03-29 RX ADMIN — ATORVASTATIN CALCIUM 20 MG: 20 TABLET, FILM COATED ORAL at 09:07

## 2024-03-29 RX ADMIN — HYDRALAZINE HYDROCHLORIDE 25 MG: 25 TABLET ORAL at 18:35

## 2024-03-29 RX ADMIN — HYDRALAZINE HYDROCHLORIDE 50 MG: 50 TABLET, FILM COATED ORAL at 22:12

## 2024-03-29 RX ADMIN — HYDROXYCHLOROQUINE SULFATE 200 MG: 200 TABLET ORAL at 09:07

## 2024-03-29 RX ADMIN — ENOXAPARIN SODIUM 40 MG: 100 INJECTION SUBCUTANEOUS at 09:07

## 2024-03-29 RX ADMIN — SERTRALINE 100 MG: 100 TABLET, FILM COATED ORAL at 09:07

## 2024-03-29 RX ADMIN — CLONIDINE HYDROCHLORIDE 0.1 MG: 0.1 TABLET ORAL at 14:07

## 2024-03-29 RX ADMIN — CLONIDINE HYDROCHLORIDE 0.1 MG: 0.1 TABLET ORAL at 09:07

## 2024-03-29 RX ADMIN — CETIRIZINE HYDROCHLORIDE 10 MG: 10 TABLET, FILM COATED ORAL at 09:07

## 2024-03-29 RX ADMIN — SODIUM CHLORIDE, POTASSIUM CHLORIDE, SODIUM LACTATE AND CALCIUM CHLORIDE: 600; 310; 30; 20 INJECTION, SOLUTION INTRAVENOUS at 09:13

## 2024-03-29 RX ADMIN — FUROSEMIDE 20 MG: 20 TABLET ORAL at 09:07

## 2024-03-29 RX ADMIN — TRAZODONE HYDROCHLORIDE 100 MG: 100 TABLET ORAL at 22:12

## 2024-03-29 RX ADMIN — AMLODIPINE BESYLATE 5 MG: 5 TABLET ORAL at 09:07

## 2024-03-29 RX ADMIN — SODIUM CHLORIDE, PRESERVATIVE FREE 10 ML: 5 INJECTION INTRAVENOUS at 22:16

## 2024-03-29 RX ADMIN — LOSARTAN POTASSIUM 50 MG: 50 TABLET, FILM COATED ORAL at 09:07

## 2024-03-29 RX ADMIN — CLONIDINE HYDROCHLORIDE 0.1 MG: 0.1 TABLET ORAL at 22:12

## 2024-03-29 RX ADMIN — HYDRALAZINE HYDROCHLORIDE 25 MG: 25 TABLET ORAL at 09:07

## 2024-03-29 RX ADMIN — PANTOPRAZOLE SODIUM 20 MG: 20 TABLET, DELAYED RELEASE ORAL at 07:25

## 2024-03-29 NOTE — PROGRESS NOTES
Dr Skinner notified re: bp 188/78. Pt alert and oriented, denies s/s hypertension. Will continue to monitor.

## 2024-03-29 NOTE — CARE COORDINATION
Case Management Assessment  Initial Evaluation    Date/Time of Evaluation: 3/29/2024 12:14 PM  Assessment Completed by: ANUSHKA Steen    If patient is discharged prior to next notation, then this note serves as note for discharge by case management.    Patient Name: Ayaka Arevalo                   YOB: 1945  Diagnosis: Bradycardia [R00.1]  Pyuria [R82.81]  Acute renal failure superimposed on chronic kidney disease, unspecified acute renal failure type, unspecified CKD stage (HCC) [N17.9, N18.9]                   Date / Time: 3/28/2024  4:30 PM    Patient Admission Status: Observation   Readmission Risk (Low < 19, Mod (19-27), High > 27): No data recorded  Current PCP: Concepcion Mar APRN  PCP verified by CM? (P) Yes    Chart Reviewed: Yes      History Provided by: (P) Patient  Patient Orientation: (P) Alert and Oriented, Person, Situation, Place, Self    Patient Cognition: (P) Alert    Hospitalization in the last 30 days (Readmission):  No    If yes, Readmission Assessment in  Navigator will be completed.    Advance Directives:      Code Status: Full Code   Patient's Primary Decision Maker is: (P) Patient Declined (Legal Next of Kin Remains as Decision Maker)    Primary Decision Maker: Jack Carroll - Brother/Sister - [phone]    Discharge Planning:    Patient lives with: (P) Alone Type of Home: (P) House  Primary Care Giver: (P) Self  Patient Support Systems include: (P) Family Members   Current Financial resources: (P) Medicare  Current community resources: (P) None  Current services prior to admission: (P) None            Current DME:  Cane, Wheelchair, Walker, Grab bars in bathroom, Shower chair            Type of Home Care services:  (P) None    ADLS  Prior functional level: (P) Independent in ADLs/IADLs  Current functional level: (P) Independent in ADLs/IADLs    PT AM-PAC: 24 /24  OT AM-PAC: 24 /24    Family can provide assistance at DC: (P) Yes  Would you like Case Management  Oriented;Person;Situation;Place;Self   Cognition Alert   History Provided By Patient   Primary Caregiver Self   Accompanied By/Relationship N/A   Support Systems Family Members   Patient's Healthcare Decision Maker is: Patient Declined (Legal Next of Kin Remains as Decision Maker)   PCP Verified by CM Yes   Last Visit to PCP Within last 3 months   Prior Functional Level Independent in ADLs/IADLs   Current Functional Level Independent in ADLs/IADLs   Can patient return to prior living arrangement Yes   Ability to make needs known: Good   Family able to assist with home care needs: Yes   Would you like for me to discuss the discharge plan with any other family members/significant others, and if so, who? Yes  (If needed; Brother- Jack Carroll)   Financial Resources Medicare   Community Resources None   CM/SW Referral Other (see comment)  (Discharge Planning)   Discharge Planning   Type of Residence House   Living Arrangements Alone   Current Services Prior To Admission None   Potential Assistance Needed N/A   DME Ordered? No   Potential Assistance Purchasing Medications No   Type of Home Care Services None   Patient expects to be discharged to: House   History of falls? 0   Services At/After Discharge   Transition of Care Consult (CM Consult) Discharge Planning   Services At/After Discharge None   Mode of Transport at Discharge Other (see comment)  (Friend/Family)   Confirm Follow Up Transport Self   Condition of Participation: Discharge Planning   The Plan for Transition of Care is related to the following treatment goals: Strengthening     Electronically signed by ANUSHKA Steen on 3/29/2024 at 12:14 PM

## 2024-03-29 NOTE — PLAN OF CARE
Problem: Discharge Planning  Goal: Discharge to home or other facility with appropriate resources  Outcome: Progressing  Flowsheets (Taken 3/28/2024 2237)  Discharge to home or other facility with appropriate resources:   Identify barriers to discharge with patient and caregiver   Identify discharge learning needs (meds, wound care, etc)   Refer to discharge planning if patient needs post-hospital services based on physician order or complex needs related to functional status, cognitive ability or social support system   Arrange for needed discharge resources and transportation as appropriate     Problem: Safety - Adult  Goal: Free from fall injury  Outcome: Progressing     Problem: Respiratory - Adult  Goal: Achieves optimal ventilation and oxygenation  Outcome: Progressing  Flowsheets (Taken 3/28/2024 2217)  Achieves optimal ventilation and oxygenation:   Assess for changes in respiratory status   Assess for changes in mentation and behavior   Position to facilitate oxygenation and minimize respiratory effort   Oxygen supplementation based on oxygen saturation or arterial blood gases   Encourage broncho-pulmonary hygiene including cough, deep breathe, incentive spirometry   Assess and instruct to report shortness of breath or any respiratory difficulty   Respiratory therapy support as indicated     Problem: Cardiovascular - Adult  Goal: Maintains optimal cardiac output and hemodynamic stability  Outcome: Progressing  Flowsheets (Taken 3/28/2024 2217)  Maintains optimal cardiac output and hemodynamic stability:   Monitor blood pressure and heart rate   Monitor urine output and notify Licensed Independent Practitioner for values outside of normal range   Assess for signs of decreased cardiac output   Administer fluid and/or volume expanders as ordered  Goal: Absence of cardiac dysrhythmias or at baseline  Outcome: Progressing  Flowsheets (Taken 3/28/2024 2217)  Absence of cardiac dysrhythmias or at baseline:  Monitor cardiac rate and rhythm

## 2024-03-29 NOTE — PLAN OF CARE
Problem: Discharge Planning  Goal: Discharge to home or other facility with appropriate resources  3/29/2024 1242 by Mary Stevens RN  Outcome: Progressing  3/29/2024 0314 by Priyanka Weber RN  Outcome: Progressing  Flowsheets (Taken 3/28/2024 2237)  Discharge to home or other facility with appropriate resources:   Identify barriers to discharge with patient and caregiver   Identify discharge learning needs (meds, wound care, etc)   Refer to discharge planning if patient needs post-hospital services based on physician order or complex needs related to functional status, cognitive ability or social support system   Arrange for needed discharge resources and transportation as appropriate     Problem: Safety - Adult  Goal: Free from fall injury  3/29/2024 1242 by Mary Stevens RN  Outcome: Progressing  3/29/2024 0314 by Priyanka Weber RN  Outcome: Progressing     Problem: Respiratory - Adult  Goal: Achieves optimal ventilation and oxygenation  3/29/2024 1242 by Mary Stevens RN  Outcome: Progressing  3/29/2024 0314 by Priyanka Weber RN  Outcome: Progressing  Flowsheets (Taken 3/28/2024 2217)  Achieves optimal ventilation and oxygenation:   Assess for changes in respiratory status   Assess for changes in mentation and behavior   Position to facilitate oxygenation and minimize respiratory effort   Oxygen supplementation based on oxygen saturation or arterial blood gases   Encourage broncho-pulmonary hygiene including cough, deep breathe, incentive spirometry   Assess and instruct to report shortness of breath or any respiratory difficulty   Respiratory therapy support as indicated     Problem: Cardiovascular - Adult  Goal: Maintains optimal cardiac output and hemodynamic stability  3/29/2024 1242 by Mary Stevens RN  Outcome: Progressing  3/29/2024 0314 by Priyanka Weber RN  Outcome: Progressing  Flowsheets (Taken 3/28/2024 2217)  Maintains optimal cardiac output and

## 2024-03-29 NOTE — PROGRESS NOTES
Medication Reconciliation    List of medications patient is currently taking is complete.     Source of information: 1. Conversation with patient at bedside                                      2. EPIC records      Allergies  Alcohol, Omeprazole, and Tizanidine     Notes regarding home medications:   1. Patient received all of her morning home medications prior to arrival to the emergency department.    2. For clarification, patient has no allergic reaction to Prilosec and simply did not like the medication when taken in the past for reasons unspecified. She confirmed to take Protonix with no adverse reactions.    Eden Tafoya, Pharmacy Intern  3/28/2024 9:23 PM

## 2024-03-29 NOTE — PROGRESS NOTES
Occupational Therapy  Facility/Department: [address] MED SURG  Occupational Therapy Initial Assessment and Discharge     Name: Ayaka Arevalo  : 1945  MRN: [id number]  Date of Service: 3/29/2024    Discharge Recommendations:  Home independently        Ayaka Arevalo scored a 24/24 on the AM-PAC ADL Inpatient form.  At this time, no further OT is recommended upon discharge due to pt functioning at baseline level of independent.  Recommend patient returns to prior setting with prior services.       Patient Diagnosis(es): The primary encounter diagnosis was Acute renal failure superimposed on chronic kidney disease, unspecified acute renal failure type, unspecified CKD stage (HCC). Diagnoses of Pyuria and Bradycardia were also pertinent to this visit.  Past Medical History:  has a past medical history of Arthritis, Asthma, Cancer (HCC), Chronic kidney disease, Fibromyalgia, Hx of blood clots, Hyperlipidemia, Hypertension, and Shingles.  Past Surgical History:  has a past surgical history that includes Breast surgery; Colonoscopy; Endoscopy, colon, diagnostic; eye surgery; Hysterectomy; Bladder surgery (N/A, ); back surgery (Left, ); Arm Surgery (Right); Tonsillectomy; Carotid endarterectomy (Right); and Upper gastrointestinal endoscopy (N/A, 2023).           Assessment   Assessment: Pt presents to be functioning at/near baseline, and does not require any additional acute OT. Pt UAL in room completing ADLs independently, and completed fxl mobility community distance in hallway with no AD independently. Anticipate pt will be safe to return home independently at d/c. Pt in agreement she is functioning at baseline, denies need for OT services. No acute OT goals identified, will discharge.  Prognosis: Good  Decision Making: Low Complexity  History: see above for PMHx. Social hx: Pt lives alone in a one-story house with 1 TR to enter, independent ADLs, IADLs, and fxl mobility.  No Skilled OT:  Normal  Sensation: Intact    ADL  Grooming: Independent  Grooming Skilled Clinical Factors: standing at sink to wash hands  LE Bathing Skilled Clinical Factors: pt reports she bathed independently earlier this date  LE Dressing: Independent  LE Dressing Skilled Clinical Factors: to don/doff socks  Toileting: Independent  Toileting Skilled Clinical Factors: to void urine at commode, independent clothing management and toileting hygiene  Functional Mobility: Independent  Functional Mobility Skilled Clinical Factors: around room and community distance in hallway to/from visitor's lounge with no AD independently.  Additional Comments: Pt UAL in room completing ADLs independently.    Activity Tolerance  Activity Tolerance: Patient tolerated evaluation without incident    Bed mobility  Supine to Sit: Modified independent (HOB elevated)  Sit to Supine: Unable to assess (the pt up to the chair)    Transfers  Sit to stand: Independent  Stand to sit: Independent    Vision  Vision: Impaired  Vision Exceptions: Wears glasses at all times  Hearing  Hearing: Within functional limits    Cognition  Overall Cognitive Status: WFL  Orientation  Overall Orientation Status: Within Functional Limits    Static Sitting Balance: independent  Dynamic Sitting Balance: independent  Static Standing Balance: independent  Dynamic Standing Balance: independent    Education Given To: Patient  Education Provided: Role of Therapy  Education Method: Verbal  Barriers to Learning: None  Education Outcome: Verbalized understanding                            AM-PAC - ADL  AM-PAC Daily Activity - Inpatient   How much help is needed for putting on and taking off regular lower body clothing?: None  How much help is needed for bathing (which includes washing, rinsing, drying)?: None  How much help is needed for toileting (which includes using toilet, bedpan, or urinal)?: None  How much help is needed for putting on and taking off regular upper body clothing?:

## 2024-03-29 NOTE — PROGRESS NOTES
4 Eyes Skin Assessment     NAME:  Ayaka Arevalo  YOB: 1945  MEDICAL RECORD NUMBER:  [id number]    The patient is being assessed for  Admission    I agree that at least one RN has performed a thorough Head to Toe Skin Assessment on the patient. ALL assessment sites listed below have been assessed.      Areas assessed by both nurses:    Head, Face, Ears, Shoulders, Back, Chest, Arms, Elbows, Hands, Sacrum. Buttock, Coccyx, Ischium, Legs. Feet and Heels, and Under Medical Devices         Does the Patient have a Wound? No noted wound(s)       Lebron Prevention initiated by RN: No  Wound Care Orders initiated by RN: No    Pressure Injury (Stage 3,4, Unstageable, DTI, NWPT, and Complex wounds) if present, place Wound referral order by RN under : No    New Ostomies, if present place, Ostomy referral order under : No     Nurse 1 eSignature: Electronically signed by Priyanka Weber RN on 3/29/24 at 1:38 AM EDT    **SHARE this note so that the co-signing nurse can place an eSignature**    Nurse 2 eSignature: Electronically signed by María Land RN on 3/29/24 at 6:53 AM EDT

## 2024-03-29 NOTE — DISCHARGE INSTRUCTIONS
Decrease clonidine to twice daily then 1/2 twice daily in 2 weeks then stop   Increase hydralazine to 50mg twice daily

## 2024-03-29 NOTE — PROGRESS NOTES
V2.0    [facility] Progress Note      Name:  Ayaka Arevalo /Age/Sex: 1945  (78 y.o. female)   MRN & CSN:  [id number] & [id number] Encounter Date/Time: 3/29/2024 2:16 PM EDT   Location:  [id number] PCP: Concepcion Mar APRN     Attending:Ezra Skinner MD       Hospital Day: 2    Assessment and Recommendations   Ayaka Arevalo is a 78 y.o. female with pmh of hypertension, chronic kidney disease stage II, obstructive sleep apnea she is on both clonidine and atenolol for her hypertension who presented with weakness involving both lower extremities, and sustaining a fall.  In the emergency room, her heart rate was in the high 40s low 50s.  Subsequently patient was admitted with near syncope.      Plan:   Near syncope: Patient denied actually feeling like passing out, her main complaint was weakness involving both lower extremities, she said that her heart rate is usually in the 40s and 50s, and does not unusual for her and it has been going on for years.  She denies any chest pain palpitation, she is going for echo that still pending at the time of this dictation.  Bradycardia: Suspect precipitated by her atenolol which the dose has been decreased from 50-25 by her primary care physician and also the presence of clonidine.  History of hypertension continue with home medication, explained to her that I believe clonidine needs to be discontinued, but I will leave that decision to her PCP.  Chronic kidney disease with minimal acute kidney injury, seem to be resolving and close to baseline  Pending echo result      Diet ADULT DIET; Regular; Low Fat/Low Chol/High Fiber/2 gm Na   DVT Prophylaxis [x] Lovenox, []  Heparin, [] SCDs, [] Ambulation,  [] Eliquis, [] Xarelto  [] Coumadin   Code Status Full Code   Disposition From: Home  Expected Disposition: Home  Estimated Date of Discharge: 3/29/2024  Patient requires continued admission due to pending echo   Surrogate Decision Maker/ POA Son

## 2024-03-29 NOTE — PROGRESS NOTES
Pt admitted from the ED to room 4109. Alert and oriented x4.   Ambulated to the bed with a steady gait.  Audible wheezing, shortness of breath and cough noted.  Dyspnea with exertion and at rest. Unable to take deep breaths.  Orthostatic blood pressures obtained. Tele monitor placed.  Denies nausea, vomiting, numbness, tingling, headache, dizziness, blurred vision or chest pain.  Oriented to room and call light. Warm blankets and non skid socks placed.  Call light within reach. Will continue to monitor.

## 2024-03-29 NOTE — PROGRESS NOTES
Physical Therapy  Facility/Department: [address] MED SURG  Physical Therapy Initial and Discharge Assessment    Name: Ayaka Arevalo  : 1945  MRN: [id number]  Date of Service: 3/29/2024    Discharge Recommendations:  Home independently, No therapy recommended at discharge   Ayaka Arevalo scored a 24/24 on the AM-PAC short mobility form.  At this time, no further PT is recommended upon discharge due to no needs.  Recommend patient returns to prior setting with prior services.    PT Equipment Recommendations  Equipment Needed: No      Patient Diagnosis(es): The primary encounter diagnosis was Acute renal failure superimposed on chronic kidney disease, unspecified acute renal failure type, unspecified CKD stage (HCC). Diagnoses of Pyuria and Bradycardia were also pertinent to this visit.  Past Medical History:  has a past medical history of Arthritis, Asthma, Cancer (HCC), Chronic kidney disease, Fibromyalgia, Hx of blood clots, Hyperlipidemia, Hypertension, and Shingles.  Past Surgical History:  has a past surgical history that includes Breast surgery; Colonoscopy; Endoscopy, colon, diagnostic; eye surgery; Hysterectomy; Bladder surgery (N/A, 2019); back surgery (Left, ); Arm Surgery (Right); Tonsillectomy; Carotid endarterectomy (Right); and Upper gastrointestinal endoscopy (N/A, 2023).    Assessment   Assessment: The pt is a 77 yo female who presented to the ED with lightheadedness and dizziness resulting in a slow fall to the floor. Her HR was in the 40's when EMS arrived. PMHx: hypertension, GERD, hyperlipidemia, CKD stage II, MARTÍNEZ, breast Ca, fibromyalgia  The pt reports she lives aone in a house and PTA, she was indep in mobility using no device, indep in self-careand IADL's including driving. She helps care for her brother with CP. Today, the pt demonstrated that she is functioning close or at her baseline. She is up ad jered in the room, managed her toileting indep and was able to  Inpatient CMS 0-100% Score: 0  Mobility Inpatient CMS G-Code Modifier : CH      Goals  Short Term Goals  Time Frame for Short Term Goals: no acute care PT goals identified  Patient Goals   Patient Goals : to go home       Education  Patient Education  Education Given To: Patient  Education Provided: Role of Therapy  Education Method: Verbal  Barriers to Learning: None  Education Outcome: Verbalized understanding;Demonstrated understanding      Therapy Time   Individual Concurrent Group Co-treatment   Time In 1054         Time Out 1113         Minutes 19         Timed Code Treatment Minutes: 4 Minutes       Electronically signed by Sandy Gutierrez, PT [id number] on 3/29/2024 at 11:26 AM

## 2024-03-29 NOTE — H&P
every 4 hours as needed for Wheezing or Shortness of Breath 1/9/24   Concepcion Mar APRN   pantoprazole (PROTONIX) 20 MG tablet TAKE ONE TABLET BY MOUTH EVERY MORNING BEFORE BREAKFAST 12/7/23   Concepcion Mar APRN   atenolol (TENORMIN) 25 MG tablet Take 1 tablet by mouth daily    Sukhdeep Cavazos MD   traZODone (DESYREL) 100 MG tablet TAKE ONE TABLET BY MOUTH ONCE NIGHTLY AS NEEDED FOR SLEEP 11/2/23   Concepcion Mar APRN   gabapentin (NEURONTIN) 400 MG capsule Take 1 capsule by mouth 3 times daily for 90 days. 7/3/23 6/13/24  Ariana Bruce MD   furosemide (LASIX) 20 MG tablet TAKE ONE TABLET BY MOUTH DAILY 6/14/23   Tye Vivar MD   hydroxychloroquine (PLAQUENIL) 200 MG tablet TAKE 1 TAB BY MOUTH DAILY 3/24/23   Deborah Mandujano MD   cloNIDine (CATAPRES) 0.1 MG tablet Take 1 tablet by mouth 2 times daily    Sukhdeep Cavazos MD   losartan (COZAAR) 50 MG tablet Take 1 tablet by mouth daily 4/12/22   Sukhdeep Cavazos MD   TURMERIC CURCUMIN PO Take 2 capsules by mouth daily    Sukhdeep Cavazos MD   acetaminophen (TYLENOL) 325 MG tablet Take 2 tablets by mouth every 4 hours as needed    Sukhdeep Cavazos MD   acyclovir (ZOVIRAX) 400 MG tablet Take 1 tablet by mouth 2 times daily 3/17/21   Sukhdeep Cavazos MD   vitamin D3 (CHOLECALCIFEROL) 125 MCG (5000 UT) TABS tablet Take 3 tablets by mouth daily    Sukhdeep Cavazos MD   docusate (COLACE, DULCOLAX) 100 MG CAPS Take 100 mg by mouth 2 times daily    Sukhdeep Cavazos MD   Echinacea 380 MG CAPS Take 2 capsules by mouth daily    Sukhdeep Cavazos MD   hydrALAZINE (APRESOLINE) 25 MG tablet Take 1 tablet by mouth 2 times daily 9/27/21   Sukhdeep Cavazos MD   loratadine (CLARITIN) 10 MG tablet Take 1 tablet by mouth daily    Sukhdeep Cavazos MD       Labs: Personally reviewed and interpreted for clinical significance.   Recent Labs     03/28/24  1649   WBC 4.2   HGB 14.3   HCT 41.2   PLT 96*     Recent

## 2024-03-30 VITALS
SYSTOLIC BLOOD PRESSURE: 149 MMHG | RESPIRATION RATE: 14 BRPM | TEMPERATURE: 98.1 F | HEART RATE: 51 BPM | OXYGEN SATURATION: 96 % | DIASTOLIC BLOOD PRESSURE: 74 MMHG | BODY MASS INDEX: 23.49 KG/M2 | HEIGHT: 67 IN | WEIGHT: 149.69 LBS

## 2024-03-30 LAB
ANION GAP SERPL CALCULATED.3IONS-SCNC: 6 MMOL/L (ref 3–16)
BUN SERPL-MCNC: 12 MG/DL (ref 7–20)
CALCIUM SERPL-MCNC: 10.6 MG/DL (ref 8.3–10.6)
CHLORIDE SERPL-SCNC: 104 MMOL/L (ref 99–110)
CO2 SERPL-SCNC: 28 MMOL/L (ref 21–32)
CREAT SERPL-MCNC: 0.9 MG/DL (ref 0.6–1.2)
GFR SERPLBLD CREATININE-BSD FMLA CKD-EPI: 65 ML/MIN/{1.73_M2}
GLUCOSE SERPL-MCNC: 110 MG/DL (ref 70–99)
POTASSIUM SERPL-SCNC: 3.7 MMOL/L (ref 3.5–5.1)
SODIUM SERPL-SCNC: 138 MMOL/L (ref 136–145)

## 2024-03-30 PROCEDURE — 6370000000 HC RX 637 (ALT 250 FOR IP): Performed by: HOSPITALIST

## 2024-03-30 PROCEDURE — 36415 COLL VENOUS BLD VENIPUNCTURE: CPT

## 2024-03-30 PROCEDURE — 6370000000 HC RX 637 (ALT 250 FOR IP): Performed by: FAMILY MEDICINE

## 2024-03-30 PROCEDURE — 80048 BASIC METABOLIC PNL TOTAL CA: CPT

## 2024-03-30 PROCEDURE — 96361 HYDRATE IV INFUSION ADD-ON: CPT

## 2024-03-30 PROCEDURE — G0378 HOSPITAL OBSERVATION PER HR: HCPCS

## 2024-03-30 PROCEDURE — 94760 N-INVAS EAR/PLS OXIMETRY 1: CPT

## 2024-03-30 PROCEDURE — 96372 THER/PROPH/DIAG INJ SC/IM: CPT

## 2024-03-30 PROCEDURE — 2580000003 HC RX 258: Performed by: FAMILY MEDICINE

## 2024-03-30 PROCEDURE — 6360000002 HC RX W HCPCS: Performed by: FAMILY MEDICINE

## 2024-03-30 RX ORDER — AMLODIPINE BESYLATE 10 MG/1
10 TABLET ORAL DAILY
Status: DISCONTINUED | OUTPATIENT
Start: 2024-03-31 | End: 2024-03-30 | Stop reason: HOSPADM

## 2024-03-30 RX ORDER — LOSARTAN POTASSIUM 100 MG/1
100 TABLET ORAL DAILY
Status: DISCONTINUED | OUTPATIENT
Start: 2024-03-30 | End: 2024-03-30 | Stop reason: HOSPADM

## 2024-03-30 RX ORDER — HYDRALAZINE HYDROCHLORIDE 20 MG/ML
10 INJECTION INTRAMUSCULAR; INTRAVENOUS EVERY 6 HOURS PRN
Status: DISCONTINUED | OUTPATIENT
Start: 2024-03-30 | End: 2024-03-30 | Stop reason: HOSPADM

## 2024-03-30 RX ORDER — LOSARTAN POTASSIUM 100 MG/1
100 TABLET ORAL DAILY
Qty: 30 TABLET | Refills: 3 | Status: SHIPPED | OUTPATIENT
Start: 2024-03-31

## 2024-03-30 RX ADMIN — ACETAMINOPHEN 325MG 650 MG: 325 TABLET ORAL at 02:12

## 2024-03-30 RX ADMIN — CETIRIZINE HYDROCHLORIDE 10 MG: 10 TABLET, FILM COATED ORAL at 08:24

## 2024-03-30 RX ADMIN — FUROSEMIDE 20 MG: 20 TABLET ORAL at 08:24

## 2024-03-30 RX ADMIN — ATORVASTATIN CALCIUM 20 MG: 20 TABLET, FILM COATED ORAL at 08:24

## 2024-03-30 RX ADMIN — PANTOPRAZOLE SODIUM 20 MG: 20 TABLET, DELAYED RELEASE ORAL at 05:46

## 2024-03-30 RX ADMIN — CLONIDINE HYDROCHLORIDE 0.1 MG: 0.1 TABLET ORAL at 08:24

## 2024-03-30 RX ADMIN — HYDROXYCHLOROQUINE SULFATE 200 MG: 200 TABLET ORAL at 08:24

## 2024-03-30 RX ADMIN — SERTRALINE 100 MG: 100 TABLET, FILM COATED ORAL at 08:24

## 2024-03-30 RX ADMIN — HYDRALAZINE HYDROCHLORIDE 50 MG: 50 TABLET, FILM COATED ORAL at 07:02

## 2024-03-30 RX ADMIN — ENOXAPARIN SODIUM 40 MG: 100 INJECTION SUBCUTANEOUS at 08:23

## 2024-03-30 RX ADMIN — ACETAMINOPHEN 325MG 650 MG: 325 TABLET ORAL at 08:37

## 2024-03-30 RX ADMIN — SODIUM CHLORIDE, PRESERVATIVE FREE 10 ML: 5 INJECTION INTRAVENOUS at 08:22

## 2024-03-30 RX ADMIN — AMLODIPINE BESYLATE 5 MG: 5 TABLET ORAL at 07:02

## 2024-03-30 RX ADMIN — LOSARTAN POTASSIUM 100 MG: 100 TABLET, FILM COATED ORAL at 08:24

## 2024-03-30 ASSESSMENT — PAIN DESCRIPTION - ORIENTATION
ORIENTATION: MID
ORIENTATION: MID

## 2024-03-30 ASSESSMENT — PAIN DESCRIPTION - ONSET: ONSET: ON-GOING

## 2024-03-30 ASSESSMENT — PAIN SCALES - GENERAL
PAINLEVEL_OUTOF10: 0
PAINLEVEL_OUTOF10: 3
PAINLEVEL_OUTOF10: 3
PAINLEVEL_OUTOF10: 0
PAINLEVEL_OUTOF10: 3

## 2024-03-30 ASSESSMENT — PAIN DESCRIPTION - DESCRIPTORS
DESCRIPTORS: CRAMPING
DESCRIPTORS: CRAMPING

## 2024-03-30 ASSESSMENT — PAIN DESCRIPTION - LOCATION
LOCATION: HEAD
LOCATION: HEAD

## 2024-03-30 NOTE — PROGRESS NOTES
Provider notified of elevated BP. Per Provider administer AM  Hydralazine and Norvasc at this time.  Pt updated on POC and medication administered per Mar.

## 2024-03-30 NOTE — DISCHARGE SUMMARY
V2.0  Discharge Summary    Name:  Ayaka Arevalo /Age/Sex: 1945 (78 y.o. female)   Admit Date: 3/28/2024  Discharge Date: 3/30/24    MRN & CSN:  [id number] & [id number] Encounter Date and Time 3/30/24 10:50 AM EDT    Attending:  Ezra Skinner MD Discharging Provider: Ezra Skinner MD       Hospital Course:     Brief HPI: Ayaka Arevalo is a 78 y.o. female with past medical history of hypertension, chronic kidney disease stage II, obstructive sleep apnea who presented with a fall at home, initially was assessed as potential syncope, but then after lengthy discussion with her she indicated that her legs gave out on her and they felt like spaghetti.  She did not lose her consciousness    Brief Problem Based Course:   Near syncope: Patient decayed that she did not have symptoms of presyncope or dizziness or vertigo, her legs gave out on her and she fell to the ground, either way patient was admitted she had an echo showed no wall motion abnormality with EF about 60% but showed hypertrophic changes consistent with long-term hypertension  Bradycardia: Patient has long-term bradycardia, related to her atenolol and clonidine and be managed properly, she has no syncope or presyncopal episode.  Uncontrolled hypertension with hypertensive emergency, patient has been taking clonidine hydralazine felodipine atenolol and Cozaar, while she was in the hospital blood pressure was poorly controlled, I elected to increase her hydralazine from 25 mg twice a day to 50 mg twice a day, and continue with felodipine, but also recommended for her to try to decrease clonidine to twice a day and eventually stop as it may contribute to her bradycardia.  Diastolic cardiomyopathy with normal ejection fraction, she is not in acute congestive heart failure, no adjustment treatment required she will follow-up with the primary care physician      The patient expressed appropriate understanding of, and agreement with the  discharge recommendations, medications, and plan.     Consults this admission:  None    Discharge Diagnosis:   Bradycardia  Hypertensive emergency  Presyncopal      Discharge Instruction:   Follow up appointments: With her primary nephrologist and hypertensive disease doctor  Primary care physician: Concepcion Mar APRN within 2 weeks  Diet: cardiac diet   Activity: activity as tolerated  Disposition: Discharged to:   [x]Home, [][facility], []SNF, []Acute Rehab, []Hospice none  Condition on discharge: Stable  Labs and Tests to be Followed up as an outpatient by PCP or Specialist: None    Discharge Medications:        Medication List        CHANGE how you take these medications      cloNIDine 0.1 MG tablet  Commonly known as: CATAPRES  Take 1 tablet by mouth 2 times daily  What changed: when to take this     gabapentin 400 MG capsule  Commonly known as: NEURONTIN  Take 1 capsule by mouth 3 times daily for 90 days.  What changed:   when to take this  additional instructions     hydrALAZINE 50 MG tablet  Commonly known as: APRESOLINE  Take 1 tablet by mouth 2 times daily  What changed:   medication strength  how much to take     losartan 100 MG tablet  Commonly known as: COZAAR  Take 1 tablet by mouth daily  Start taking on: March 31, 2024  What changed:   medication strength  how much to take     simvastatin 40 MG tablet  Commonly known as: ZOCOR  TAKE ONE TABLET BY MOUTH ONCE NIGHTLY  What changed: when to take this            CONTINUE taking these medications      acetaminophen 325 MG tablet  Commonly known as: TYLENOL     acyclovir 400 MG tablet  Commonly known as: ZOVIRAX     * albuterol (2.5 MG/3ML) 0.083% nebulizer solution  Commonly known as: PROVENTIL  Take 3 mLs by nebulization every 6 hours as needed for Wheezing     * albuterol sulfate  (90 Base) MCG/ACT inhaler  Commonly known as: PROVENTIL;VENTOLIN;PROAIR  Inhale 1 puff into the lungs every 4 hours as needed for Wheezing or Shortness of Breath

## 2024-03-30 NOTE — PROGRESS NOTES
Discharge for tonight post-poned till tomorrow d/t remaining elevated blood pressure: 195/78-> pt vocalized being very uncomfortable w/ b/p being that high and going home.

## 2024-03-30 NOTE — PROGRESS NOTES
pantoprazole  20 mg Oral QAM AC    sertraline  100 mg Oral Daily    atorvastatin  20 mg Oral Daily      Infusions:    sodium chloride       PRN Meds: hydrALAZINE, 10 mg, Q6H PRN  sodium chloride flush, 5-40 mL, PRN  sodium chloride, , PRN  ondansetron, 4 mg, Q8H PRN   Or  ondansetron, 4 mg, Q6H PRN  polyethylene glycol, 17 g, Daily PRN  acetaminophen, 650 mg, Q6H PRN   Or  acetaminophen, 650 mg, Q6H PRN  melatonin, 3 mg, Nightly PRN  perflutren lipid microspheres, 1.5 mL, ONCE PRN  traZODone, 100 mg, Nightly PRN  albuterol, 2.5 mg, Q6H PRN        Labs and Imaging   CT HEAD WO CONTRAST    Result Date: 3/28/2024  EXAMINATION: CT OF THE HEAD WITHOUT CONTRAST  3/28/2024 6:10 pm TECHNIQUE: CT of the head was performed without the administration of intravenous contrast. Automated exposure control, iterative reconstruction, and/or weight based adjustment of the mA/kV was utilized to reduce the radiation dose to as low as reasonably achievable. COMPARISON: CT head 12/21/2023. HISTORY: ORDERING SYSTEM PROVIDED HISTORY: dizzy TECHNOLOGIST PROVIDED HISTORY: Reason for exam:->dizzy Has a \"code stroke\" or \"stroke alert\" been called?->No Decision Support Exception - unselect if not a suspected or confirmed emergency medical condition->Emergency Medical Condition (MA) Reason for Exam: dizzy FINDINGS: BRAIN/VENTRICLES: There is no acute intracranial hemorrhage, mass effect or midline shift.  No abnormal extra-axial fluid collection.  The gray-white differentiation is maintained without evidence of an acute infarct.  Mild small vessel ischemic changes.  Mild generalized cerebral volume loss.  There is no evidence of hydrocephalus. SINUSES: The visualized paranasal sinuses and mastoid air cells demonstrate no acute abnormality. SOFT TISSUES/SKULL:  No acute abnormality of the visualized skull or soft tissues.     No acute intracranial abnormality.     XR CHEST PORTABLE    Result Date: 3/28/2024  EXAMINATION: ONE XRAY VIEW OF THE  Cultures:   Lab Results   Component Value Date/Time    BC No Growth after 4 days of incubation. 04/19/2022 01:43 PM     Lab Results   Component Value Date/Time    BLOODCULT2 No Growth after 4 days of incubation. 04/19/2022 01:43 PM     Organism: No results found for: \"ORG\"

## 2024-03-30 NOTE — PLAN OF CARE
Problem: Discharge Planning  Goal: Discharge to home or other facility with appropriate resources  3/30/2024 1348 by Karen Harris RN  Outcome: Completed  3/30/2024 1015 by Karen Harris RN  Outcome: Progressing  3/30/2024 0450 by María Land RN  Outcome: Progressing     Problem: Safety - Adult  Goal: Free from fall injury  3/30/2024 1348 by Karen Harris RN  Outcome: Completed  3/30/2024 1015 by Karen Harris RN  Outcome: Progressing  3/30/2024 0450 by María Land RN  Outcome: Progressing     Problem: Respiratory - Adult  Goal: Achieves optimal ventilation and oxygenation  3/30/2024 1348 by Karen Harris RN  Outcome: Completed  3/30/2024 1015 by Karen Harris RN  Outcome: Progressing  3/30/2024 0450 by María Land RN  Outcome: Progressing     Problem: Cardiovascular - Adult  Goal: Maintains optimal cardiac output and hemodynamic stability  3/30/2024 1348 by Karen Harris RN  Outcome: Completed  3/30/2024 1015 by Karen Harris RN  Outcome: Progressing  3/30/2024 0450 by María Land RN  Outcome: Progressing  Goal: Absence of cardiac dysrhythmias or at baseline  3/30/2024 1348 by Karen Harris RN  Outcome: Completed  3/30/2024 1015 by Karen Harris RN  Outcome: Progressing  3/30/2024 0450 by María Land RN  Outcome: Progressing     Problem: ABCDS Injury Assessment  Goal: Absence of physical injury  3/30/2024 1348 by Karen Harris RN  Outcome: Completed  3/30/2024 1015 by Karen Harris RN  Outcome: Progressing  3/30/2024 0450 by María Land RN  Outcome: Progressing

## 2024-03-30 NOTE — PLAN OF CARE
Problem: Discharge Planning  Goal: Discharge to home or other facility with appropriate resources  Outcome: Progressing     Problem: Safety - Adult  Goal: Free from fall injury  Outcome: Progressing     Problem: Respiratory - Adult  Goal: Achieves optimal ventilation and oxygenation  Outcome: Progressing     Problem: Cardiovascular - Adult  Goal: Maintains optimal cardiac output and hemodynamic stability  Outcome: Progressing  Goal: Absence of cardiac dysrhythmias or at baseline  Outcome: Progressing     Problem: ABCDS Injury Assessment  Goal: Absence of physical injury  Outcome: Progressing

## 2024-03-30 NOTE — PROGRESS NOTES
Pt discharge education provided, answer all question. Iv removed without any complication. Pt ambulated to [facility] wheelchair.

## 2024-03-30 NOTE — PLAN OF CARE
Problem: Discharge Planning  Goal: Discharge to home or other facility with appropriate resources  3/30/2024 1015 by Karen Harris RN  Outcome: Progressing  3/30/2024 0450 by María Land RN  Outcome: Progressing     Problem: Safety - Adult  Goal: Free from fall injury  3/30/2024 1015 by Karen Harris RN  Outcome: Progressing  3/30/2024 0450 by María Land RN  Outcome: Progressing     Problem: Respiratory - Adult  Goal: Achieves optimal ventilation and oxygenation  3/30/2024 1015 by Karen Harris RN  Outcome: Progressing  3/30/2024 0450 by María Land RN  Outcome: Progressing     Problem: Cardiovascular - Adult  Goal: Maintains optimal cardiac output and hemodynamic stability  3/30/2024 1015 by Karen Harris RN  Outcome: Progressing  3/30/2024 0450 by María Land RN  Outcome: Progressing  Goal: Absence of cardiac dysrhythmias or at baseline  3/30/2024 1015 by Karen Harris RN  Outcome: Progressing  3/30/2024 0450 by María Land RN  Outcome: Progressing     Problem: ABCDS Injury Assessment  Goal: Absence of physical injury  3/30/2024 1015 by Karen Harris RN  Outcome: Progressing  3/30/2024 0450 by María Land RN  Outcome: Progressing

## 2024-04-01 ENCOUNTER — TELEPHONE (OUTPATIENT)
Dept: INTERNAL MEDICINE CLINIC | Age: 79
End: 2024-04-01

## 2024-04-01 NOTE — TELEPHONE ENCOUNTER
Care Transitions Initial Follow Up Call    Outreach made within 2 business days of discharge: Yes    Patient: Ayaka Arevalo Patient : 1945   MRN: [id number]  Reason for Admission: There are no discharge diagnoses documented for the most recent discharge.  Discharge Date: 3/30/24       Spoke with: Patient    Discharge department/facility: [facility] Interactive Patient Contact:  Was patient able to fill all prescriptions: Yes  Was patient instructed to bring all medications to the follow-up visit: Yes  Is patient taking all medications as directed in the discharge summary? Yes  Does patient understand their discharge instructions: Yes  Does patient have questions or concerns that need addressed prior to 7-14 day follow up office visit: no    Scheduled appointment with PCP within 7-14 days    Follow Up  Future Appointments   Date Time Provider Department Center   2024  8:45 AM Concepcion Mar APRN [facility] Zach Bryant MA

## 2024-04-08 ENCOUNTER — OFFICE VISIT (OUTPATIENT)
Dept: INTERNAL MEDICINE CLINIC | Age: 79
End: 2024-04-08
Payer: MEDICARE

## 2024-04-08 VITALS
SYSTOLIC BLOOD PRESSURE: 132 MMHG | DIASTOLIC BLOOD PRESSURE: 72 MMHG | WEIGHT: 149 LBS | TEMPERATURE: 98.1 F | BODY MASS INDEX: 23.34 KG/M2 | OXYGEN SATURATION: 97 % | HEART RATE: 52 BPM

## 2024-04-08 DIAGNOSIS — D69.6 THROMBOCYTOPENIA, UNSPECIFIED (HCC): ICD-10-CM

## 2024-04-08 DIAGNOSIS — I10 PRIMARY HYPERTENSION: ICD-10-CM

## 2024-04-08 DIAGNOSIS — M05.79 RHEUMATOID ARTHRITIS INVOLVING MULTIPLE SITES WITH POSITIVE RHEUMATOID FACTOR (HCC): ICD-10-CM

## 2024-04-08 DIAGNOSIS — Z78.0 POST-MENOPAUSAL: ICD-10-CM

## 2024-04-08 DIAGNOSIS — Z09 HOSPITAL DISCHARGE FOLLOW-UP: Primary | ICD-10-CM

## 2024-04-08 PROCEDURE — 1090F PRES/ABSN URINE INCON ASSESS: CPT | Performed by: NURSE PRACTITIONER

## 2024-04-08 PROCEDURE — 3075F SYST BP GE 130 - 139MM HG: CPT | Performed by: NURSE PRACTITIONER

## 2024-04-08 PROCEDURE — 99214 OFFICE O/P EST MOD 30 MIN: CPT | Performed by: NURSE PRACTITIONER

## 2024-04-08 PROCEDURE — 1111F DSCHRG MED/CURRENT MED MERGE: CPT | Performed by: NURSE PRACTITIONER

## 2024-04-08 PROCEDURE — G8427 DOCREV CUR MEDS BY ELIG CLIN: HCPCS | Performed by: NURSE PRACTITIONER

## 2024-04-08 PROCEDURE — G8399 PT W/DXA RESULTS DOCUMENT: HCPCS | Performed by: NURSE PRACTITIONER

## 2024-04-08 PROCEDURE — G8420 CALC BMI NORM PARAMETERS: HCPCS | Performed by: NURSE PRACTITIONER

## 2024-04-08 PROCEDURE — 3078F DIAST BP <80 MM HG: CPT | Performed by: NURSE PRACTITIONER

## 2024-04-08 PROCEDURE — 1124F ACP DISCUSS-NO DSCNMKR DOCD: CPT | Performed by: NURSE PRACTITIONER

## 2024-04-08 PROCEDURE — 1036F TOBACCO NON-USER: CPT | Performed by: NURSE PRACTITIONER

## 2024-04-08 ASSESSMENT — ENCOUNTER SYMPTOMS: SHORTNESS OF BREATH: 0

## 2024-04-08 NOTE — PROGRESS NOTES
losartan (COZAAR) 100 MG tablet Take 1 tablet by mouth daily 30 tablet 3    cloNIDine (CATAPRES) 0.1 MG tablet Take 1 tablet by mouth 2 times daily 60 tablet 3    hydrALAZINE (APRESOLINE) 50 MG tablet Take 1 tablet by mouth 2 times daily 90 tablet 3    oxyBUTYnin (DITROPAN-XL) 10 MG extended release tablet Take 1 tablet by mouth daily      felodipine (PLENDIL) 5 MG extended release tablet Take 1 tablet by mouth daily      Multiple Vitamins-Minerals (THERAPEUTIC MULTIVITAMIN-MINERALS) tablet Take 1 tablet by mouth daily Balance of Nature vitamins      sertraline (ZOLOFT) 100 MG tablet TAKE 1 TABLET BY MOUTH DAILY 90 tablet 1    simvastatin (ZOCOR) 40 MG tablet TAKE ONE TABLET BY MOUTH ONCE NIGHTLY (Patient taking differently: Take 1 tablet by mouth daily) 90 tablet 0    albuterol (PROVENTIL) (2.5 MG/3ML) 0.083% nebulizer solution Take 3 mLs by nebulization every 6 hours as needed for Wheezing 120 each 2    albuterol sulfate HFA (PROVENTIL;VENTOLIN;PROAIR) 108 (90 Base) MCG/ACT inhaler Inhale 1 puff into the lungs every 4 hours as needed for Wheezing or Shortness of Breath 1 each 2    pantoprazole (PROTONIX) 20 MG tablet TAKE ONE TABLET BY MOUTH EVERY MORNING BEFORE BREAKFAST 90 tablet 1    atenolol (TENORMIN) 25 MG tablet Take 1 tablet by mouth daily      traZODone (DESYREL) 100 MG tablet TAKE ONE TABLET BY MOUTH ONCE NIGHTLY AS NEEDED FOR SLEEP 90 tablet 1    gabapentin (NEURONTIN) 400 MG capsule Take 1 capsule by mouth 3 times daily for 90 days. 270 capsule 0    furosemide (LASIX) 20 MG tablet TAKE ONE TABLET BY MOUTH DAILY 90 tablet 3    hydroxychloroquine (PLAQUENIL) 200 MG tablet TAKE 1 TAB BY MOUTH DAILY 30 tablet 0    TURMERIC CURCUMIN PO Take 2 capsules by mouth daily      acetaminophen (TYLENOL) 325 MG tablet Take 2 tablets by mouth every 4 hours as needed      acyclovir (ZOVIRAX) 400 MG tablet Take 1 tablet by mouth daily      vitamin D3 (CHOLECALCIFEROL) 125 MCG (5000 UT) TABS tablet Take 3 tablets by

## 2024-04-08 NOTE — PATIENT INSTRUCTIONS
Check blood pressure prior to taking medications each day  Do NOT take atenolol is heart rate is less than 50 or blood pressure is less than 110/80    See hematologist

## 2024-04-16 NOTE — TELEPHONE ENCOUNTER
Medication:   Requested Prescriptions     Pending Prescriptions Disp Refills    pantoprazole (PROTONIX) 20 MG tablet 90 tablet 1     Sig: Take 1 tablet by mouth every morning (before breakfast)        Last Filled:      Patient Phone Number: [phone] (home)     Last appt: 11/9/2023   Next appt: Visit date not found    Last OARRS:        No data to display                   4 = No assist / stand by assistance

## 2024-05-06 DIAGNOSIS — G47.00 INSOMNIA, UNSPECIFIED TYPE: ICD-10-CM

## 2024-05-06 RX ORDER — TRAZODONE HYDROCHLORIDE 100 MG/1
TABLET ORAL
Qty: 90 TABLET | Refills: 1 | Status: SHIPPED | OUTPATIENT
Start: 2024-05-06

## 2024-05-06 NOTE — TELEPHONE ENCOUNTER
Medication:   Requested Prescriptions     Pending Prescriptions Disp Refills    traZODone (DESYREL) 100 MG tablet [Pharmacy Med Name: traZODone 100 MG TABLET] 90 tablet 1     Sig: TAKE ONE TABLET BY MOUTH ONCE NIGHTLY AS NEEDED FOR SLEEP       Last Filled:  11.2.23    Patient Phone Number: [phone] (home)     Last appt: 4/8/2024   Next appt: 8/12/2024  Future Appointments   Date Time Provider Department Center   8/12/2024  9:00 AM Concepcion Mar APRN [facility] [place] [facility]

## 2024-05-22 ENCOUNTER — TELEPHONE (OUTPATIENT)
Dept: INTERNAL MEDICINE CLINIC | Age: 79
End: 2024-05-22

## 2024-05-22 NOTE — TELEPHONE ENCOUNTER
----- Message from Ayaka Arevalo sent at 5/21/2024  4:21 PM EDT -----  Regarding: Lidocaine   Contact: [phone]  I have been using OTC lidocain for back problems. 1.  Can too much lidocaine cause. Strok like symptoms?   2 can I OD OR CAUSE TOO MUCH LIDOCAINE?

## 2024-05-22 NOTE — TELEPHONE ENCOUNTER
Patient not having stroke sx's she was just asking if she had 2 patches on if she would be ok. She is aware to call 911 if she has any stroke sx's

## 2024-06-07 DIAGNOSIS — K21.9 GASTROESOPHAGEAL REFLUX DISEASE, UNSPECIFIED WHETHER ESOPHAGITIS PRESENT: ICD-10-CM

## 2024-06-07 DIAGNOSIS — R13.10 DYSPHAGIA, UNSPECIFIED TYPE: ICD-10-CM

## 2024-06-07 RX ORDER — PANTOPRAZOLE SODIUM 20 MG/1
TABLET, DELAYED RELEASE ORAL
Qty: 90 TABLET | Refills: 1 | Status: SHIPPED | OUTPATIENT
Start: 2024-06-07

## 2024-06-07 NOTE — TELEPHONE ENCOUNTER
Future Appointments   Date Time Provider Department Center   8/12/2024  9:00 AM Concepcion Mar APRN [facility] [place] [facility]       Last appt 4/8/24

## 2024-07-17 SDOH — ECONOMIC STABILITY: FOOD INSECURITY: WITHIN THE PAST 12 MONTHS, THE FOOD YOU BOUGHT JUST DIDN'T LAST AND YOU DIDN'T HAVE MONEY TO GET MORE.: NEVER TRUE

## 2024-07-17 SDOH — ECONOMIC STABILITY: FOOD INSECURITY: WITHIN THE PAST 12 MONTHS, YOU WORRIED THAT YOUR FOOD WOULD RUN OUT BEFORE YOU GOT MONEY TO BUY MORE.: NEVER TRUE

## 2024-07-17 SDOH — ECONOMIC STABILITY: INCOME INSECURITY: HOW HARD IS IT FOR YOU TO PAY FOR THE VERY BASICS LIKE FOOD, HOUSING, MEDICAL CARE, AND HEATING?: SOMEWHAT HARD

## 2024-07-17 SDOH — ECONOMIC STABILITY: TRANSPORTATION INSECURITY
IN THE PAST 12 MONTHS, HAS LACK OF TRANSPORTATION KEPT YOU FROM MEETINGS, WORK, OR FROM GETTING THINGS NEEDED FOR DAILY LIVING?: NO

## 2024-07-18 ENCOUNTER — OFFICE VISIT (OUTPATIENT)
Dept: INTERNAL MEDICINE CLINIC | Age: 79
End: 2024-07-18

## 2024-07-18 VITALS
OXYGEN SATURATION: 96 % | HEART RATE: 50 BPM | BODY MASS INDEX: 22.62 KG/M2 | DIASTOLIC BLOOD PRESSURE: 88 MMHG | WEIGHT: 144.4 LBS | SYSTOLIC BLOOD PRESSURE: 138 MMHG

## 2024-07-18 DIAGNOSIS — I10 PRIMARY HYPERTENSION: ICD-10-CM

## 2024-07-18 DIAGNOSIS — R19.8 ALTERNATING CONSTIPATION AND DIARRHEA: Primary | ICD-10-CM

## 2024-07-18 DIAGNOSIS — R11.10 VOMITING, UNSPECIFIED VOMITING TYPE, UNSPECIFIED WHETHER NAUSEA PRESENT: ICD-10-CM

## 2024-07-18 DIAGNOSIS — E83.52 HYPERCALCEMIA: ICD-10-CM

## 2024-07-18 DIAGNOSIS — E78.5 HYPERLIPIDEMIA, UNSPECIFIED HYPERLIPIDEMIA TYPE: ICD-10-CM

## 2024-07-18 LAB
CHOLEST SERPL-MCNC: 156 MG/DL (ref 0–199)
HDLC SERPL-MCNC: 46 MG/DL (ref 40–60)
LDL CHOLESTEROL: 81 MG/DL
TRIGL SERPL-MCNC: 146 MG/DL (ref 0–150)
VLDLC SERPL CALC-MCNC: 29 MG/DL

## 2024-07-18 ASSESSMENT — ENCOUNTER SYMPTOMS
CONSTIPATION: 1
VOMITING: 1
DIARRHEA: 1
NAUSEA: 1
SHORTNESS OF BREATH: 0

## 2024-07-18 NOTE — PROGRESS NOTES
NORMAL  NORMAL mg/dL Final    Nitrite, Urine 07/15/2024 NEGATIVE  NEGATIVE Final    Leukocyte Esterase, Urine 07/15/2024 2+ (75 Soy/uL) (A)  NEGATIVE Final    Tested at [place] [address] [address]    Erythrocytes, Urine 07/15/2024 0 to 2  <6 /HPF Final    LEUKOCYTES, UA 07/15/2024 <6  <6 /HPF Final    BACTERIA, URINE 07/15/2024 SEE NOTES (A)  NONE /HPF Final    FEW    Epithelial Cells, UA 07/15/2024 0 to 5  /HPF Final    Tested at [facility] [address] [id number]    Creatinine, Ur 07/15/2024 81.5  mg/dL Final    Protein, Urine, Random 07/15/2024 8.7  0.0 - 9.9 mg/dL Final    PROTEIN/CREAT RATIO URINE RAN 07/15/2024 0.11  <0.16 Ratio Final    Comment: A normal ratio is a reliable indicator that there is no significant 24-hour proteinuria.  Tested at [place] [address] [address]      1,3 Beta Glucan SER-MCNC 07/15/2024 Random  mLs Final    Protein, Urine, Random 07/15/2024 7.9  0.0 - 9.9 mg/dL Final    Random Paraprotein 07/15/2024 NOT DETECTED  NOT DETECTED mg/dL Final    PROTEIN ELECTROPHORESIS, URINE 07/15/2024 No evidence of monoclonal protein is seen.   Final    Comment: Annette Sanhdu MD  CPT [id number]  Tested at [facility] [address] [address]      Total Protein 07/15/2024 5.60 (L)  6.00 - 8.00 g/dL Final    Albumin 07/15/2024 3.89  3.40 - 5.10 g/dL Final    Alpha-1-Globulin 07/15/2024 0.24  0.10 - 0.40 g/dL Final    Alpha 2 07/15/2024 0.45  0.40 - 1.10 g/dL Final    Beta Globulin 07/15/2024 0.53 (L)  0.60 - 1.40 g/dL Final    Gamma Globulin 07/15/2024 0.49 (L)  0.50 - 1.70 g/dL Final    Abnormal Protein Band 07/15/2024 NOT DETECTED  NOT DETECTED g/dL Final    Albumin/Globulin Ratio 07/15/2024 2.27  Ratio Final    Protein Pattern 07/15/2024 [address]   Final    Comment: No evidence of monoclonal protein is seen. Annette Sandhu MD CPT  Tested at [facility] [address] [address]         Family History

## 2024-08-20 ENCOUNTER — HOSPITAL ENCOUNTER (OUTPATIENT)
Dept: CT IMAGING | Age: 79
Discharge: HOME | End: 2024-08-20
Payer: MEDICARE

## 2024-08-20 DIAGNOSIS — R19.8 ALTERNATING CONSTIPATION AND DIARRHEA: ICD-10-CM

## 2024-08-20 DIAGNOSIS — R11.10 VOMITING, UNSPECIFIED VOMITING TYPE, UNSPECIFIED WHETHER NAUSEA PRESENT: ICD-10-CM

## 2024-08-20 PROCEDURE — 74177 CT ABD & PELVIS W/CONTRAST: CPT

## 2024-08-20 PROCEDURE — 6360000004 HC RX CONTRAST MEDICATION: Performed by: NURSE PRACTITIONER

## 2024-08-20 RX ADMIN — IOPAMIDOL 75 ML: 755 INJECTION, SOLUTION INTRAVENOUS at 13:55

## 2024-08-20 RX ADMIN — IOPAMIDOL 50 ML: 612 INJECTION, SOLUTION INTRAVENOUS at 13:55

## 2024-08-22 DIAGNOSIS — K82.8 THICKENING OF WALL OF GALLBLADDER: Primary | ICD-10-CM

## 2024-08-24 DIAGNOSIS — R60.0 BILATERAL LEG EDEMA: ICD-10-CM

## 2024-08-26 RX ORDER — FUROSEMIDE 20 MG
20 TABLET ORAL DAILY
Qty: 90 TABLET | Refills: 1 | Status: SHIPPED | OUTPATIENT
Start: 2024-08-26

## 2024-08-28 DIAGNOSIS — F32.A DEPRESSION, UNSPECIFIED DEPRESSION TYPE: ICD-10-CM

## 2024-08-28 RX ORDER — SERTRALINE HYDROCHLORIDE 100 MG/1
100 TABLET, FILM COATED ORAL DAILY
Qty: 90 TABLET | Refills: 1 | Status: SHIPPED | OUTPATIENT
Start: 2024-08-28

## 2024-10-10 DIAGNOSIS — R05.1 ACUTE COUGH: Primary | ICD-10-CM

## 2024-10-10 DIAGNOSIS — J45.20 MILD INTERMITTENT ASTHMA WITHOUT COMPLICATION: ICD-10-CM

## 2024-10-11 NOTE — TELEPHONE ENCOUNTER
No future appointments.    Last appt 7/18/24   Pt mentioned that she wanted to do her MRI at [facility]  Ask her where this is located.     Also let her know that at times insurance does not cover MRI's at times if you don't do PT

## 2024-10-14 RX ORDER — ALBUTEROL SULFATE 0.83 MG/ML
2.5 SOLUTION RESPIRATORY (INHALATION) EVERY 6 HOURS PRN
Qty: 120 EACH | Refills: 2 | Status: SHIPPED | OUTPATIENT
Start: 2024-10-14

## 2024-10-14 RX ORDER — ALBUTEROL SULFATE 90 UG/1
1 INHALANT RESPIRATORY (INHALATION) EVERY 4 HOURS PRN
Qty: 1 EACH | Refills: 2 | Status: SHIPPED | OUTPATIENT
Start: 2024-10-14

## 2024-10-31 DIAGNOSIS — G47.00 INSOMNIA, UNSPECIFIED TYPE: ICD-10-CM

## 2024-10-31 RX ORDER — TRAZODONE HYDROCHLORIDE 100 MG/1
TABLET ORAL
Qty: 90 TABLET | Refills: 1 | Status: SHIPPED | OUTPATIENT
Start: 2024-10-31

## 2024-10-31 NOTE — TELEPHONE ENCOUNTER
Future Appointments   Date Time Provider Department Center   11/26/2024 10:00 AM [place] [facility] RM 4 BV [facility]   11/26/2024 12:00 PM [place] [facility] MED CAM RM 4 [facility]       Last appt 7/18/24

## 2024-11-26 ENCOUNTER — HOSPITAL ENCOUNTER (OUTPATIENT)
Dept: NUCLEAR MEDICINE | Age: 79
Discharge: HOME | End: 2024-11-26
Attending: INTERNAL MEDICINE
Payer: MEDICARE

## 2024-11-26 DIAGNOSIS — N18.31 STAGE 3A CHRONIC KIDNEY DISEASE (HCC): ICD-10-CM

## 2024-11-26 DIAGNOSIS — N25.0 RENAL OSTEODYSTROPHY: ICD-10-CM

## 2024-11-26 PROCEDURE — A9500 TC99M SESTAMIBI: HCPCS | Performed by: INTERNAL MEDICINE

## 2024-11-26 PROCEDURE — 78071 PARATHYRD PLANAR W/WO SUBTRJ: CPT

## 2024-11-26 PROCEDURE — 3430000000 HC RX DIAGNOSTIC RADIOPHARMACEUTICAL: Performed by: INTERNAL MEDICINE

## 2024-11-26 RX ORDER — TETRAKIS(2-METHOXYISOBUTYLISOCYANIDE)COPPER(I) TETRAFLUOROBORATE 1 MG/ML
20 INJECTION, POWDER, LYOPHILIZED, FOR SOLUTION INTRAVENOUS
Status: COMPLETED | OUTPATIENT
Start: 2024-11-26 | End: 2024-11-26

## 2024-11-26 RX ADMIN — TETRAKIS(2-METHOXYISOBUTYLISOCYANIDE)COPPER(I) TETRAFLUOROBORATE 20 MILLICURIE: 1 INJECTION, POWDER, LYOPHILIZED, FOR SOLUTION INTRAVENOUS at 10:30

## 2024-11-29 NOTE — ED PROVIDER NOTES
I independently examined and evaluated Ayaka Arevalo.    In brief, patient is a 78-year-old female presents to the emergency department for evaluation after she felt weak and lowered herself to the floor.  She reports she felt shaky, abdomen her hands was tremulous when she was holding onto her cell phone.  Says she suddenly felt weak and lowered herself to the ground and was brought to the ER for evaluation.  Focused exam revealed awake, alert, female, answering questions appropriately.  Denies having room spinning sensation.  Has no facial droop, no pronator drift, no leg drift, and has intact sensation to all extremities.  Troponin is 11 x 2.  Urinalysis with trace leukocyte, 7 WBC.  Will send for culture.  Given ceftriaxone in the ER.  Has an ANETTE with creatinine of 1.6, up from baseline of 1.1.  Hospitalist consulted for admission for further evaluation and treatment.  Admit.     The Ekg interpreted by me shows  Sinus bradycardia with a rate of 50  Axis is left  QTc is prolonged at 465    ST Segments: Nonspecific ST changes    All diagnostic, treatment, and disposition decisions were made by myself in conjunction with the advanced practice provider/resident physician.     I personally saw the patient and performed a substantive portion of the visit including aspects of the medical decision making. I approved management plan and take responsibility for the patient management.     I personally saw the patient and independently provided 0 minutes of non-concurrent critical care out of the total shared critical care time provided.    Comment: Please note this report has been produced using speech recognition software and may contain errors related to that system including errors in grammar, punctuation, and spelling, as well as words and phrases that may be inappropriate. If there are any questions or concerns please feel free to contact the dictating provider for clarification.    For all further details of 
  Stiven Fernández PA  03/28/24 [id number]    
no history of blood product transfusion

## 2024-12-04 DIAGNOSIS — E78.5 HYPERLIPIDEMIA, UNSPECIFIED HYPERLIPIDEMIA TYPE: ICD-10-CM

## 2024-12-05 RX ORDER — SIMVASTATIN 40 MG
40 TABLET ORAL NIGHTLY
Qty: 90 TABLET | Refills: 0 | Status: SHIPPED | OUTPATIENT
Start: 2024-12-05

## 2024-12-07 DIAGNOSIS — R13.10 DYSPHAGIA, UNSPECIFIED TYPE: ICD-10-CM

## 2024-12-07 DIAGNOSIS — K21.9 GASTROESOPHAGEAL REFLUX DISEASE, UNSPECIFIED WHETHER ESOPHAGITIS PRESENT: ICD-10-CM

## 2024-12-09 RX ORDER — PANTOPRAZOLE SODIUM 20 MG/1
TABLET, DELAYED RELEASE ORAL
Qty: 90 TABLET | Refills: 0 | Status: SHIPPED | OUTPATIENT
Start: 2024-12-09

## 2024-12-09 NOTE — TELEPHONE ENCOUNTER
Medication:   Requested Prescriptions     Pending Prescriptions Disp Refills    pantoprazole (PROTONIX) 20 MG tablet [Pharmacy Med Name: PANTOPRAZOLE SOD DR 20 MG TAB] 90 tablet 1     Sig: TAKE ONE TABLET BY MOUTH EVERY MORNING BEFORE BREAKFAST       Last Filled:      Patient Phone Number: [phone] (home)     Last appt: 7/18/2024   Next appt: Visit date not found  No future appointments.

## 2024-12-12 ENCOUNTER — TELEPHONE (OUTPATIENT)
Dept: INTERNAL MEDICINE CLINIC | Age: 79
End: 2024-12-12

## 2024-12-12 NOTE — TELEPHONE ENCOUNTER
Pt was seen today by Dr. Lloyd, he is requesting a referral for her to see Nephrology for Hyperparathyroidism. With a positive scan    Thank you    Whoever you recommend    [phone]

## 2024-12-13 DIAGNOSIS — E21.3 HYPERPARATHYROIDISM (HCC): Primary | ICD-10-CM

## 2024-12-31 ENCOUNTER — OFFICE VISIT (OUTPATIENT)
Age: 79
End: 2024-12-31

## 2024-12-31 VITALS
OXYGEN SATURATION: 94 % | DIASTOLIC BLOOD PRESSURE: 72 MMHG | SYSTOLIC BLOOD PRESSURE: 120 MMHG | BODY MASS INDEX: 23.96 KG/M2 | TEMPERATURE: 98 F | WEIGHT: 153 LBS | HEART RATE: 55 BPM

## 2024-12-31 DIAGNOSIS — J01.10 ACUTE FRONTAL SINUSITIS, RECURRENCE NOT SPECIFIED: Primary | ICD-10-CM

## 2024-12-31 DIAGNOSIS — R05.9 COUGH, UNSPECIFIED TYPE: ICD-10-CM

## 2024-12-31 LAB
INFLUENZA A ANTIBODY: NEGATIVE
INFLUENZA B ANTIBODY: NEGATIVE
Lab: NORMAL
PERFORMING INSTRUMENT: NORMAL
QC PASS/FAIL: NORMAL
SARS-COV-2, POC: NORMAL

## 2024-12-31 RX ORDER — PREDNISONE 20 MG/1
20 TABLET ORAL DAILY
Qty: 5 TABLET | Refills: 0 | Status: SHIPPED | OUTPATIENT
Start: 2024-12-31 | End: 2025-01-05

## 2024-12-31 ASSESSMENT — ENCOUNTER SYMPTOMS
COUGH: 1
SHORTNESS OF BREATH: 1

## 2024-12-31 NOTE — PROGRESS NOTES
Ayaka Arevalo (:  1945) is a 79 y.o. female,New patient, here for evaluation of the following chief complaint(s):  Sinusitis, Congestion, Chest Pain, Shortness of Breath, and Cough      ASSESSMENT/PLAN:    ICD-10-CM    1. Acute frontal sinusitis, recurrence not specified  J01.10 amoxicillin-clavulanate (AUGMENTIN) 875-125 MG per tablet     predniSONE (DELTASONE) 20 MG tablet      2. Cough, unspecified type  R05.9 POCT Influenza A/B     POCT COVID-19, Antigen        Results for orders placed or performed in visit on 24   POCT COVID-19, Antigen   Result Value Ref Range    SARS-COV-2, POC Not-Detected Not Detected    Lot Number [id number]     QC Pass/Fail pass     Performing Instrument BinaxNOW    POCT Influenza A/B   Result Value Ref Range    Influenza A Ab negative     Influenza B Ab negative         Dx Disposition: sinusitis  Education and handout provided on diagnosis and management of symptoms.   AVS reviewed with patient. Follow up as needed in UC or with PCP for new or worsening symptoms.   No follow-ups on file.    SUBJECTIVE/OBJECTIVE:  Patient presents today with complaints of cough head congestion and scratchy throat that started 3 days ago      History provided by:  Patient   used: No    Sinusitis  Associated symptoms include coughing and shortness of breath.   Chest Pain   Associated symptoms include a cough and shortness of breath.   Shortness of Breath  Associated symptoms include chest pain.   Cough  Associated symptoms include chest pain and shortness of breath.       Vitals:    24 1248   BP: 120/72   Site: Left Upper Arm   Position: Sitting   Cuff Size: Medium Adult   Pulse: 55   Temp: 98 °F (36.7 °C)   TempSrc: Oral   SpO2: 94%   Weight: 69.4 kg (153 lb)       Review of Systems   Respiratory:  Positive for cough and shortness of breath.    Cardiovascular:  Positive for chest pain.       Physical Exam  Constitutional:       Appearance: Normal appearance.

## 2025-01-16 DIAGNOSIS — R13.10 DYSPHAGIA, UNSPECIFIED TYPE: ICD-10-CM

## 2025-01-16 DIAGNOSIS — M79.7 FIBROMYALGIA: ICD-10-CM

## 2025-01-16 DIAGNOSIS — G47.00 INSOMNIA, UNSPECIFIED TYPE: ICD-10-CM

## 2025-01-16 DIAGNOSIS — K21.9 GASTROESOPHAGEAL REFLUX DISEASE, UNSPECIFIED WHETHER ESOPHAGITIS PRESENT: ICD-10-CM

## 2025-01-16 DIAGNOSIS — R05.1 ACUTE COUGH: ICD-10-CM

## 2025-01-16 DIAGNOSIS — M51.369 DDD (DEGENERATIVE DISC DISEASE), LUMBAR: ICD-10-CM

## 2025-01-16 RX ORDER — TRAZODONE HYDROCHLORIDE 100 MG/1
100 TABLET ORAL NIGHTLY PRN
Qty: 90 TABLET | Refills: 1 | Status: CANCELLED | OUTPATIENT
Start: 2025-01-16

## 2025-01-16 RX ORDER — PANTOPRAZOLE SODIUM 20 MG/1
20 TABLET, DELAYED RELEASE ORAL
Qty: 90 TABLET | Refills: 0 | OUTPATIENT
Start: 2025-01-16

## 2025-01-17 RX ORDER — TRAZODONE HYDROCHLORIDE 100 MG/1
100 TABLET ORAL NIGHTLY PRN
Qty: 90 TABLET | Refills: 1 | Status: SHIPPED | OUTPATIENT
Start: 2025-01-17

## 2025-01-17 RX ORDER — GABAPENTIN 400 MG/1
400 CAPSULE ORAL 3 TIMES DAILY
Qty: 270 CAPSULE | Refills: 0 | Status: SHIPPED | OUTPATIENT
Start: 2025-01-17 | End: 2025-04-17

## 2025-01-17 RX ORDER — ALBUTEROL SULFATE 90 UG/1
1 INHALANT RESPIRATORY (INHALATION) EVERY 4 HOURS PRN
Qty: 1 EACH | Refills: 2 | Status: SHIPPED | OUTPATIENT
Start: 2025-01-17

## 2025-01-20 ENCOUNTER — OFFICE VISIT (OUTPATIENT)
Age: 80
End: 2025-01-20
Payer: MEDICARE

## 2025-01-20 VITALS
SYSTOLIC BLOOD PRESSURE: 117 MMHG | WEIGHT: 152.2 LBS | RESPIRATION RATE: 16 BRPM | OXYGEN SATURATION: 96 % | HEART RATE: 65 BPM | BODY MASS INDEX: 23.84 KG/M2 | DIASTOLIC BLOOD PRESSURE: 72 MMHG

## 2025-01-20 DIAGNOSIS — E21.3 PARATHYROID HORMONE EXCESS (HCC): Primary | ICD-10-CM

## 2025-01-20 PROCEDURE — 1159F MED LIST DOCD IN RCRD: CPT | Performed by: HOSPITALIST

## 2025-01-20 PROCEDURE — 1160F RVW MEDS BY RX/DR IN RCRD: CPT | Performed by: HOSPITALIST

## 2025-01-20 PROCEDURE — 1090F PRES/ABSN URINE INCON ASSESS: CPT | Performed by: HOSPITALIST

## 2025-01-20 PROCEDURE — 1036F TOBACCO NON-USER: CPT | Performed by: HOSPITALIST

## 2025-01-20 PROCEDURE — 99204 OFFICE O/P NEW MOD 45 MIN: CPT | Performed by: HOSPITALIST

## 2025-01-20 PROCEDURE — G8420 CALC BMI NORM PARAMETERS: HCPCS | Performed by: HOSPITALIST

## 2025-01-20 PROCEDURE — 1124F ACP DISCUSS-NO DSCNMKR DOCD: CPT | Performed by: HOSPITALIST

## 2025-01-20 PROCEDURE — G8427 DOCREV CUR MEDS BY ELIG CLIN: HCPCS | Performed by: HOSPITALIST

## 2025-01-20 PROCEDURE — G8399 PT W/DXA RESULTS DOCUMENT: HCPCS | Performed by: HOSPITALIST

## 2025-01-20 NOTE — PROGRESS NOTES
Subjective:      Ayaka Arevalo, 79 y.o. female, who is here for further evaluation of elevated PTH and calcium levels.  Patient preferred name is Laurel.    Patient has CKD stage III and follows with nephrology.  At her visit in December 2024 she was noted to have hypercalcemia of 10.6.  Hence workup was done as below.   Latest Reference Range & Units 12/06/24 09:13   Creatinine 0.60 - 1.20 mg/dL 1.22 (H)      Latest Reference Range & Units 12/06/24 09:13   eGFR (CKD-EPI) >59 mL/min/1.73 m2 45 (L)      Latest Reference Range & Units 12/06/24 09:13   Calcium 8.5 - 10.4 mg/dL 10.6 (H)      Latest Reference Range & Units 12/06/24 09:14   Calcium, Ionized 4.6 - 5.4 mg/dL 5.5 (H)        Latest Reference Range & Units 12/06/24 09:13   Parathyroid Hormone 15.00 - 65.00 pg/mL 145.00 (H)      Latest Reference Range & Units 10/12/24 08:54   Vit D, 25-Hydroxy 30.0 - 150.0 ng/mL 43.7      Latest Reference Range & Units 12/06/24 09:13   Phosphorus 2.5 - 4.5 mg/dL 3.7      Latest Reference Range & Units 12/06/24 09:13   Magnesium 1.7 - 2.4 mg/dL 2.2      Latest Reference Range & Units 12/06/24 09:13   Albumin 3.5 - 5.7 g/dL 4.3      Latest Reference Range & Units 08/02/24 09:19   TSH, 3rd Generation 0.27 - 4.20 uIU/mL 1.82     UPEP showed no evidence of monoclonal protein.  Given the elevated calcium along with PTH nephrology suspected primary hyperparathyroidism.  They obtained NM parathyroid scan which showed findings suspicious for parathyroid adenoma at the inferior right thyroid bed in the appropriate clinical setting.  Given this she was referred to us for further evaluation.      she was initially told has elevated PTH levels in April/2024.    Looking at the records it appears they have had elevated calcium at least since March 2023 when it was noted to be 10.8.  The highest has been 11.5 in April 2024.  I do not see reports of 24-hour urinary studies.    Regards to hyperparathyroidism she denies history of fractures

## 2025-01-20 NOTE — ASSESSMENT & PLAN NOTE
Orders:    Calcium Ionized Serum; Future    PTH, Intact; Future    Magnesium; Future    Calcium, 24 HR Urine; Future    Creatinine Clearance, Urine, 24 HR; Future    Renal Function Panel; Future    Vitamin D 25 Hydroxy; Future

## 2025-01-20 NOTE — PATIENT INSTRUCTIONS
room temperature for a day or two, but should be kept cool or refrigerated for longer periods of time.  WHERE TO GET MORE INFORMATION -- Your healthcare provider is the best source of information for questions and concerns related to your medical problem.      -Continue to take adequate dietary calcium and vitamin D 1000 units daily as advised    -Please do not take calcium supplements    -In general please avoid falls, use night lights at night.     -Follow up in 2 weeks with the results

## 2025-01-23 DIAGNOSIS — E21.3 PARATHYROID HORMONE EXCESS (HCC): ICD-10-CM

## 2025-01-23 LAB
25(OH)D3 SERPL-MCNC: 31.1 NG/ML
ALBUMIN SERPL-MCNC: 4.5 G/DL (ref 3.4–5)
ANION GAP SERPL CALCULATED.3IONS-SCNC: 7 MMOL/L (ref 3–16)
BUN SERPL-MCNC: 15 MG/DL (ref 7–20)
CALCIUM SERPL-MCNC: 11.3 MG/DL (ref 8.3–10.6)
CHLORIDE SERPL-SCNC: 103 MMOL/L (ref 99–110)
CO2 SERPL-SCNC: 33 MMOL/L (ref 21–32)
CREAT SERPL-MCNC: 1.1 MG/DL (ref 0.6–1.2)
GFR SERPLBLD CREATININE-BSD FMLA CKD-EPI: 51 ML/MIN/{1.73_M2}
GLUCOSE SERPL-MCNC: 84 MG/DL (ref 70–99)
MAGNESIUM SERPL-MCNC: 2.11 MG/DL (ref 1.8–2.4)
PHOSPHATE SERPL-MCNC: 3.1 MG/DL (ref 2.5–4.9)
POTASSIUM SERPL-SCNC: 3.6 MMOL/L (ref 3.5–5.1)
PTH-INTACT SERPL-MCNC: 101 PG/ML (ref 14–72)
SODIUM SERPL-SCNC: 143 MMOL/L (ref 136–145)

## 2025-01-24 LAB
COLLECT DURATION TIME UR: 24 HR
CREAT 24H UR-MRATE: 1.1 G/24HR (ref 0.6–1.5)
CREAT CL 24H UR+SERPL-VRATE: 64 ML/MIN (ref 88–128)
CREAT SERPL-MCNC: 1.1 MG/DL (ref 0.6–1.2)
SPECIMEN VOL 24H UR: 3000 ML

## 2025-01-25 LAB
CA-I ADJ PH7.4 SERPL-SCNC: 1.45 MMOL/L (ref 1.09–1.3)
CA-I SERPL ISE-SCNC: 1.46 MMOL/L (ref 1.09–1.3)

## 2025-01-29 NOTE — RESULT ENCOUNTER NOTE
Nalini Barragan,  The results of the blood work showed elevated total and free calcium along with elevated PTH.  You continue to have CKD stage III.  Phosphorus, albumin, magnesium and vitamin D were within normal limits.  We will await the results of 24-hour urine calcium before making a final diagnosis.  Thank you  Mame Granados MD

## 2025-02-03 ENCOUNTER — OFFICE VISIT (OUTPATIENT)
Age: 80
End: 2025-02-03
Payer: MEDICARE

## 2025-02-03 VITALS
OXYGEN SATURATION: 98 % | WEIGHT: 151.2 LBS | SYSTOLIC BLOOD PRESSURE: 153 MMHG | HEIGHT: 67 IN | BODY MASS INDEX: 23.73 KG/M2 | TEMPERATURE: 98 F | RESPIRATION RATE: 16 BRPM | HEART RATE: 55 BPM | DIASTOLIC BLOOD PRESSURE: 84 MMHG

## 2025-02-03 DIAGNOSIS — E21.3 PARATHYROID HORMONE EXCESS (HCC): Primary | ICD-10-CM

## 2025-02-03 DIAGNOSIS — N18.31 CHRONIC KIDNEY DISEASE, STAGE 3A (HCC): ICD-10-CM

## 2025-02-03 PROCEDURE — 1159F MED LIST DOCD IN RCRD: CPT | Performed by: HOSPITALIST

## 2025-02-03 PROCEDURE — 1090F PRES/ABSN URINE INCON ASSESS: CPT | Performed by: HOSPITALIST

## 2025-02-03 PROCEDURE — 1036F TOBACCO NON-USER: CPT | Performed by: HOSPITALIST

## 2025-02-03 PROCEDURE — G8427 DOCREV CUR MEDS BY ELIG CLIN: HCPCS | Performed by: HOSPITALIST

## 2025-02-03 PROCEDURE — G8420 CALC BMI NORM PARAMETERS: HCPCS | Performed by: HOSPITALIST

## 2025-02-03 PROCEDURE — 3077F SYST BP >= 140 MM HG: CPT | Performed by: HOSPITALIST

## 2025-02-03 PROCEDURE — G8399 PT W/DXA RESULTS DOCUMENT: HCPCS | Performed by: HOSPITALIST

## 2025-02-03 PROCEDURE — 1160F RVW MEDS BY RX/DR IN RCRD: CPT | Performed by: HOSPITALIST

## 2025-02-03 PROCEDURE — 3079F DIAST BP 80-89 MM HG: CPT | Performed by: HOSPITALIST

## 2025-02-03 PROCEDURE — 1124F ACP DISCUSS-NO DSCNMKR DOCD: CPT | Performed by: HOSPITALIST

## 2025-02-03 PROCEDURE — 99214 OFFICE O/P EST MOD 30 MIN: CPT | Performed by: HOSPITALIST

## 2025-02-03 NOTE — PROGRESS NOTES
Subjective:      Ayaka Arevalo, 79 y.o. female, who is here for follow-up of elevated PTH and calcium levels.  Patient preferred name is Laurel.    Patient has CKD stage III and follows with nephrology.  At her visit in December 2024 she was noted to have hypercalcemia of 10.6.  Hence workup was done as below.   Latest Reference Range & Units 12/06/24 09:13   Creatinine 0.60 - 1.20 mg/dL 1.22 (H)      Latest Reference Range & Units 12/06/24 09:13   eGFR (CKD-EPI) >59 mL/min/1.73 m2 45 (L)      Latest Reference Range & Units 12/06/24 09:13   Calcium 8.5 - 10.4 mg/dL 10.6 (H)      Latest Reference Range & Units 12/06/24 09:14   Calcium, Ionized 4.6 - 5.4 mg/dL 5.5 (H)        Latest Reference Range & Units 12/06/24 09:13   Parathyroid Hormone 15.00 - 65.00 pg/mL 145.00 (H)      Latest Reference Range & Units 10/12/24 08:54   Vit D, 25-Hydroxy 30.0 - 150.0 ng/mL 43.7      Latest Reference Range & Units 12/06/24 09:13   Phosphorus 2.5 - 4.5 mg/dL 3.7      Latest Reference Range & Units 12/06/24 09:13   Magnesium 1.7 - 2.4 mg/dL 2.2      Latest Reference Range & Units 12/06/24 09:13   Albumin 3.5 - 5.7 g/dL 4.3      Latest Reference Range & Units 08/02/24 09:19   TSH, 3rd Generation 0.27 - 4.20 uIU/mL 1.82     UPEP showed no evidence of monoclonal protein.  Given the elevated calcium along with PTH nephrology suspected primary hyperparathyroidism.  They obtained NM parathyroid scan which showed findings suspicious for parathyroid adenoma at the inferior right thyroid bed in the appropriate clinical setting.  Given this she was referred to us for further evaluation.      she was initially told has elevated PTH levels in April/2024.    Looking at the records it appears they have had elevated calcium at least since March 2023 when it was noted to be 10.8.  The highest has been 11.5 in April 2024.  I do not see reports of 24-hour urinary studies.    Regards to hyperparathyroidism she denies history of fractures or recent

## 2025-02-03 NOTE — ASSESSMENT & PLAN NOTE
Orders:    PTH, Intact; Future    Calcium, 24 HR Urine; Future    Creatinine Clearance, Urine, 24 HR; Future    Renal Function Panel; Future

## 2025-02-03 NOTE — PATIENT INSTRUCTIONS
-Adequate hydration    -We repeat renal function panel, PTH.  We will also obtain 24-hour urine calcium and creatinine.    Patient instructions:   INTRODUCTION -- The following instructions will guide you in the proper collection of a 24-hour urine specimen. In some instances, you will be asked to collect two or three consecutive 24-hour urine samples.  INSTRUCTIONS  ?You should collect every drop of urine during each 24-hour period. It does not matter how much or little urine is passed each time, as long as every drop is collected.  ?Begin the urine collection in the morning after you wake up, after you have emptied your bladder for the first time.  ?Urinate (empty the bladder) for the first time and flush it down the toilet. Note the exact time (eg, 6:15 AM). You will begin the urine collection at this time.  ?Collect every drop of urine during the day and night in an empty collection bottle. Store the bottle at room temperature or in the refrigerator.  ?If you need to have a bowel movement, any urine passed with the bowel movement should be collected. Try not to include feces with the urine collection. If feces does get mixed in, do not try to remove the feces from the urine collection bottle.  ?Finish by collecting the first urine passed the next morning, adding it to the collection bottle. This should be within ten minutes before or after the time of the first morning void on the first day (which was flushed). In this example, you would try to void between 6:05 and 6:25 on the second day.  If you need to urinate one hour before the final collection time, drink a full glass of water so that you can void again at the appropriate time. If you have to urinate 20 minutes before, try to hold the urine until the proper time.  Please note the exact time of the final collection, even if it is not the same time as when collection began on day 1.  STORAGE -- The bottle(s) may be kept at room temperature for a day or two,

## 2025-02-13 DIAGNOSIS — E78.5 HYPERLIPIDEMIA, UNSPECIFIED HYPERLIPIDEMIA TYPE: ICD-10-CM

## 2025-02-13 DIAGNOSIS — E21.3 PARATHYROID HORMONE EXCESS: ICD-10-CM

## 2025-02-13 DIAGNOSIS — R60.0 BILATERAL LEG EDEMA: ICD-10-CM

## 2025-02-13 LAB
ALBUMIN SERPL-MCNC: 4.6 G/DL (ref 3.4–5)
ANION GAP SERPL CALCULATED.3IONS-SCNC: 9 MMOL/L (ref 3–16)
BUN SERPL-MCNC: 16 MG/DL (ref 7–20)
CALCIUM 24H UR-MRATE: 161 MG/24 HR (ref 42–353)
CALCIUM SERPL-MCNC: 11.6 MG/DL (ref 8.3–10.6)
CHLORIDE SERPL-SCNC: 102 MMOL/L (ref 99–110)
CO2 SERPL-SCNC: 26 MMOL/L (ref 21–32)
COLLECT DURATION TIME UR: 24 HR
CREAT 24H UR-MRATE: 1.1 G/24HR (ref 0.6–1.5)
CREAT CL 24H UR+SERPL-VRATE: 72 ML/MIN (ref 88–128)
CREAT SERPL-MCNC: 1 MG/DL (ref 0.6–1.2)
CREAT SERPL-MCNC: 1 MG/DL (ref 0.6–1.2)
GFR SERPLBLD CREATININE-BSD FMLA CKD-EPI: 57 ML/MIN/{1.73_M2}
GLUCOSE SERPL-MCNC: 100 MG/DL (ref 70–99)
PHOSPHATE SERPL-MCNC: 3 MG/DL (ref 2.5–4.9)
POTASSIUM SERPL-SCNC: 4.1 MMOL/L (ref 3.5–5.1)
PTH-INTACT SERPL-MCNC: 136 PG/ML (ref 14–72)
SODIUM SERPL-SCNC: 137 MMOL/L (ref 136–145)
SPECIMEN VOL 24H UR: 2200 ML

## 2025-02-13 RX ORDER — FUROSEMIDE 20 MG/1
20 TABLET ORAL DAILY
Qty: 90 TABLET | Refills: 0 | Status: SHIPPED | OUTPATIENT
Start: 2025-02-13

## 2025-02-13 RX ORDER — SIMVASTATIN 40 MG
40 TABLET ORAL NIGHTLY
Qty: 90 TABLET | Refills: 0 | Status: SHIPPED | OUTPATIENT
Start: 2025-02-13

## 2025-02-13 NOTE — TELEPHONE ENCOUNTER
Medication:   Requested Prescriptions     Pending Prescriptions Disp Refills    simvastatin (ZOCOR) 40 MG tablet 90 tablet 0     Sig: Take 1 tablet by mouth nightly       Last Filled:      Patient Phone Number: [phone] (home)     Last appt: 7/18/2024   Next appt: 2/13/2025  Future Appointments   Date Time Provider Department Center   2/26/2025 11:00 AM Mame Granados MD [facility]

## 2025-02-13 NOTE — TELEPHONE ENCOUNTER
Medication:   Requested Prescriptions     Pending Prescriptions Disp Refills    furosemide (LASIX) 20 MG tablet 90 tablet 1     Sig: Take 1 tablet by mouth daily       Last Filled:      Patient Phone Number: [phone] (home)     Last appt: 7/18/2024   Next appt: Visit date not found  Future Appointments   Date Time Provider Department Center   2/26/2025 11:00 AM Mame Granados MD [facility]

## 2025-02-21 NOTE — RESULT ENCOUNTER NOTE
Nalini Barragan,  The results of the blood work showed elevated calcium and PTH levels.  Albumin and phosphorus levels were within normal range.  You do have evidence of CKD stage III on your blood work.  24-hour urine calcium was normal at 161 mg/24 hr with a ratio of 0.01 which likely means you have probable primary hyperparathyroidism.  We can discuss further management steps at our upcoming appointment.  Thank you  Mame Granados MD

## 2025-02-24 DIAGNOSIS — F32.A DEPRESSION, UNSPECIFIED DEPRESSION TYPE: ICD-10-CM

## 2025-02-24 DIAGNOSIS — R60.0 BILATERAL LEG EDEMA: ICD-10-CM

## 2025-02-24 RX ORDER — FUROSEMIDE 20 MG/1
20 TABLET ORAL DAILY
Qty: 90 TABLET | Refills: 0 | OUTPATIENT
Start: 2025-02-24

## 2025-02-24 RX ORDER — SERTRALINE HYDROCHLORIDE 100 MG/1
100 TABLET, FILM COATED ORAL DAILY
Qty: 90 TABLET | Refills: 1 | Status: SHIPPED | OUTPATIENT
Start: 2025-02-24

## 2025-02-24 NOTE — TELEPHONE ENCOUNTER
Medication:   Requested Prescriptions     Pending Prescriptions Disp Refills    furosemide (LASIX) 20 MG tablet [Pharmacy Med Name: FUROSEMIDE 20 MG TABLET] 90 tablet 0     Sig: TAKE 1 TABLET BY MOUTH DAILY    sertraline (ZOLOFT) 100 MG tablet [Pharmacy Med Name: SERTRALINE  MG TABLET] 90 tablet 1     Sig: TAKE 1 TABLET BY MOUTH DAILY       Last Filled:      Patient Phone Number: [phone] (home)     Last appt: 7/18/2024   Next appt: Visit date not found  Future Appointments   Date Time Provider Department Center   2/26/2025 11:00 AM Mame Granados MD [facility]

## 2025-02-26 ENCOUNTER — OFFICE VISIT (OUTPATIENT)
Age: 80
End: 2025-02-26
Payer: MEDICARE

## 2025-02-26 VITALS
OXYGEN SATURATION: 99 % | DIASTOLIC BLOOD PRESSURE: 72 MMHG | RESPIRATION RATE: 16 BRPM | BODY MASS INDEX: 23.68 KG/M2 | HEART RATE: 71 BPM | WEIGHT: 151.2 LBS | SYSTOLIC BLOOD PRESSURE: 145 MMHG

## 2025-02-26 DIAGNOSIS — E21.3 PARATHYROID HORMONE EXCESS: Primary | ICD-10-CM

## 2025-02-26 PROCEDURE — 99214 OFFICE O/P EST MOD 30 MIN: CPT | Performed by: HOSPITALIST

## 2025-02-26 PROCEDURE — G8420 CALC BMI NORM PARAMETERS: HCPCS | Performed by: HOSPITALIST

## 2025-02-26 PROCEDURE — 1124F ACP DISCUSS-NO DSCNMKR DOCD: CPT | Performed by: HOSPITALIST

## 2025-02-26 PROCEDURE — 1090F PRES/ABSN URINE INCON ASSESS: CPT | Performed by: HOSPITALIST

## 2025-02-26 PROCEDURE — 1160F RVW MEDS BY RX/DR IN RCRD: CPT | Performed by: HOSPITALIST

## 2025-02-26 PROCEDURE — 3078F DIAST BP <80 MM HG: CPT | Performed by: HOSPITALIST

## 2025-02-26 PROCEDURE — 1036F TOBACCO NON-USER: CPT | Performed by: HOSPITALIST

## 2025-02-26 PROCEDURE — G8399 PT W/DXA RESULTS DOCUMENT: HCPCS | Performed by: HOSPITALIST

## 2025-02-26 PROCEDURE — 3077F SYST BP >= 140 MM HG: CPT | Performed by: HOSPITALIST

## 2025-02-26 PROCEDURE — G8427 DOCREV CUR MEDS BY ELIG CLIN: HCPCS | Performed by: HOSPITALIST

## 2025-02-26 PROCEDURE — 1159F MED LIST DOCD IN RCRD: CPT | Performed by: HOSPITALIST

## 2025-02-26 NOTE — PROGRESS NOTES
3.9 03/29/2024 05:40 AM     02/19/2025 01:29 PM    CO2 31 02/19/2025 01:29 PM    BUN 16 02/19/2025 01:29 PM    CREATININE 1.04 02/19/2025 01:29 PM    GLUCOSE 71 02/19/2025 01:29 PM    CALCIUM 11.1 02/19/2025 01:29 PM    LABGLOM 57 02/13/2025 11:30 AM    LABGLOM 65 03/30/2024 11:46 AM      Lab Results   Component Value Date/Time    VITD25 36.0 02/19/2025 01:29 PM      Lab Results   Component Value Date    IPTH 133.00 (H) 02/19/2025    CALCIUM 11.1 (H) 02/19/2025    CAION 1.46 (H) 01/23/2025    PHOS 3.1 02/19/2025     Lab Results   Component Value Date/Time    MG 2.4 02/19/2025 01:29 PM       Lab Results   Component Value Date    CA24 161 02/13/2025     NM parathyroid uptake and scan on 11/26/2024.  Findings are suspicious for parathyroid adenoma at the inferior right thyroid  bed in the appropriate clinical setting.       Assessment:     Assessment & Plan  Parathyroid hormone excess       Orders:    Ernesto Vallecillo MD, Otolaryngology, [facility]      Patient is here for follow-up of elevated PTH and calcium levels.  Based on their prior labs it appears that this is probably primary hyperparathyroidism with an element of probable excess vitamin D/secondary hyperparathyroidism from her CKD. she does not appear to be on any medications that could lead to hypercalcemia.  It appears she is asymptomatic currently except for fatigue.  Hence at her initial visit in January 2025 we ordered blood work along with 24-hour urine calcium/creatinine.  This showed elevated total and free calcium along with elevated PTH.  It was in the context of CKD stage III.  Rest of the labs of phosphorus, albumin, magnesium and vitamin D were within normal limits.  24-hour urine calcium was ordered but this was not performed.  Hence her next visit we again repeated labs and 24-hour urine studies which showed elevated calcium and PTH and 24-hour urine calcium of 161 mg/24-hour.  Hence we feel that she likely has primary

## 2025-02-26 NOTE — PATIENT INSTRUCTIONS
-Adequate hydration    -Will refer to Dr. Sanford    -Continue to take adequate dietary calcium and vitamin D 1000 units daily as advised    -Please do not take calcium supplements    -In general please avoid falls, use night lights at night.     -Follow up in 8 weeks with the results

## 2025-02-27 ENCOUNTER — OFFICE VISIT (OUTPATIENT)
Dept: INTERNAL MEDICINE CLINIC | Age: 80
End: 2025-02-27
Payer: MEDICARE

## 2025-02-27 VITALS
OXYGEN SATURATION: 99 % | SYSTOLIC BLOOD PRESSURE: 116 MMHG | HEART RATE: 62 BPM | DIASTOLIC BLOOD PRESSURE: 72 MMHG | HEIGHT: 67 IN | BODY MASS INDEX: 23.7 KG/M2 | WEIGHT: 151 LBS

## 2025-02-27 DIAGNOSIS — I10 PRIMARY HYPERTENSION: Primary | ICD-10-CM

## 2025-02-27 DIAGNOSIS — J01.40 ACUTE PANSINUSITIS, RECURRENCE NOT SPECIFIED: ICD-10-CM

## 2025-02-27 DIAGNOSIS — N18.2 CKD (CHRONIC KIDNEY DISEASE) STAGE 2, GFR 60-89 ML/MIN: ICD-10-CM

## 2025-02-27 DIAGNOSIS — E78.5 HYPERLIPIDEMIA, UNSPECIFIED HYPERLIPIDEMIA TYPE: ICD-10-CM

## 2025-02-27 DIAGNOSIS — E21.3 PARATHYROID HORMONE EXCESS: ICD-10-CM

## 2025-02-27 DIAGNOSIS — M05.79 RHEUMATOID ARTHRITIS INVOLVING MULTIPLE SITES WITH POSITIVE RHEUMATOID FACTOR (HCC): ICD-10-CM

## 2025-02-27 PROCEDURE — 1036F TOBACCO NON-USER: CPT | Performed by: NURSE PRACTITIONER

## 2025-02-27 PROCEDURE — 1160F RVW MEDS BY RX/DR IN RCRD: CPT | Performed by: NURSE PRACTITIONER

## 2025-02-27 PROCEDURE — G8420 CALC BMI NORM PARAMETERS: HCPCS | Performed by: NURSE PRACTITIONER

## 2025-02-27 PROCEDURE — 1090F PRES/ABSN URINE INCON ASSESS: CPT | Performed by: NURSE PRACTITIONER

## 2025-02-27 PROCEDURE — 1159F MED LIST DOCD IN RCRD: CPT | Performed by: NURSE PRACTITIONER

## 2025-02-27 PROCEDURE — G8399 PT W/DXA RESULTS DOCUMENT: HCPCS | Performed by: NURSE PRACTITIONER

## 2025-02-27 PROCEDURE — 3078F DIAST BP <80 MM HG: CPT | Performed by: NURSE PRACTITIONER

## 2025-02-27 PROCEDURE — 3074F SYST BP LT 130 MM HG: CPT | Performed by: NURSE PRACTITIONER

## 2025-02-27 PROCEDURE — G2211 COMPLEX E/M VISIT ADD ON: HCPCS | Performed by: NURSE PRACTITIONER

## 2025-02-27 PROCEDURE — G8427 DOCREV CUR MEDS BY ELIG CLIN: HCPCS | Performed by: NURSE PRACTITIONER

## 2025-02-27 PROCEDURE — 99214 OFFICE O/P EST MOD 30 MIN: CPT | Performed by: NURSE PRACTITIONER

## 2025-02-27 PROCEDURE — 1124F ACP DISCUSS-NO DSCNMKR DOCD: CPT | Performed by: NURSE PRACTITIONER

## 2025-02-27 RX ORDER — PREDNISONE 20 MG/1
40 TABLET ORAL DAILY
Qty: 10 TABLET | Refills: 0 | Status: SHIPPED | OUTPATIENT
Start: 2025-02-27 | End: 2025-03-04

## 2025-02-27 SDOH — ECONOMIC STABILITY: FOOD INSECURITY: WITHIN THE PAST 12 MONTHS, YOU WORRIED THAT YOUR FOOD WOULD RUN OUT BEFORE YOU GOT MONEY TO BUY MORE.: NEVER TRUE

## 2025-02-27 SDOH — ECONOMIC STABILITY: FOOD INSECURITY: WITHIN THE PAST 12 MONTHS, THE FOOD YOU BOUGHT JUST DIDN'T LAST AND YOU DIDN'T HAVE MONEY TO GET MORE.: NEVER TRUE

## 2025-02-27 ASSESSMENT — PATIENT HEALTH QUESTIONNAIRE - PHQ9
1. LITTLE INTEREST OR PLEASURE IN DOING THINGS: NOT AT ALL
SUM OF ALL RESPONSES TO PHQ QUESTIONS 1-9: 0
SUM OF ALL RESPONSES TO PHQ QUESTIONS 1-9: 0
SUM OF ALL RESPONSES TO PHQ9 QUESTIONS 1 & 2: 0
SUM OF ALL RESPONSES TO PHQ QUESTIONS 1-9: 0
SUM OF ALL RESPONSES TO PHQ QUESTIONS 1-9: 0
2. FEELING DOWN, DEPRESSED OR HOPELESS: NOT AT ALL

## 2025-02-27 ASSESSMENT — ENCOUNTER SYMPTOMS
SINUS PAIN: 1
VOMITING: 0
NAUSEA: 0
DIARRHEA: 0
SHORTNESS OF BREATH: 0

## 2025-02-27 NOTE — PROGRESS NOTES
Lymphocytes % 02/19/2025 13  % Final    Monocytes % 02/19/2025 8  % Final    Eosinophils % 02/19/2025 2  % Final    Basophils Absolute 02/19/2025 1  % Final    Tested at [facility] [address]  [id number]    Pth Intact 02/19/2025 133.00 (H)  15.00 - 65.00 pg/mL Final    Comment: (NOTE)  Intact PTH       Calcium       Interpretation  ----------       -------       --------------  15 - 65         8.6 - 10.2    Normal    > 65           > 10.2       Primary Hyperparathyroidism    < 20           > 10.2       Non-Parathyroid hypercalcemia    < 15           < 8.6        Hypoparathyroidism  Consider the above as guidelines only.  PTH results should be  interpreted in conjunction with the total or ionized calcium  level.  The finding of a persistently high-normal calcium  accompanied by a high-normal PTH (or a low-normal calcium   accompanied by a low-normal PTH) warrants further investigation.   Although the PTH may itself be within normal limits, it may be  inappropriately high (or low) relative to the circulating calcium  level.    Ingestion of juan doses of biotin (>5 mg/day) taken within 8 hours of  drawing blood sample can interfere with this immunoassay test.      Vit D, 25-Hydroxy 02/19/2025 36.0  30.0 - 150.0 ng/mL Final    Comment: (NOTE)  Preferred: >= 30 ng/mL  Insufficient: 21-29 ng/mL  Deficient <= 20  ng/mL  Possible Toxicity: >150 ng/mL  Samples should not be taken from patients receiving therapy with high  biotin doses (i.e. > 5 mg/day) until at least 8 hours following the  last biotin administration.         Family History   Problem Relation Age of Onset    Cerebral palsy Brother     Colon Polyps Brother     Colon Polyps Brother        Current Outpatient Medications   Medication Sig Dispense Refill    amoxicillin-clavulanate (AUGMENTIN) 875-125 MG per tablet Take 1 tablet by mouth 2 times daily for 7 days 14 tablet 0    predniSONE (DELTASONE) 20 MG tablet Take 2 tablets by mouth

## 2025-03-11 DIAGNOSIS — R13.10 DYSPHAGIA, UNSPECIFIED TYPE: ICD-10-CM

## 2025-03-11 DIAGNOSIS — K21.9 GASTROESOPHAGEAL REFLUX DISEASE, UNSPECIFIED WHETHER ESOPHAGITIS PRESENT: ICD-10-CM

## 2025-03-11 RX ORDER — PANTOPRAZOLE SODIUM 20 MG/1
20 TABLET, DELAYED RELEASE ORAL
Qty: 90 TABLET | Refills: 1 | Status: SHIPPED | OUTPATIENT
Start: 2025-03-11

## 2025-03-11 NOTE — TELEPHONE ENCOUNTER
Future Appointments   Date Time Provider Department Center   3/12/2025  2:10 PM [facility]   4/23/2025 10:00 AM Mame Granados MD [facility]   7/17/2025  9:00 AM Concepcion Mar APRN [facility] BS [facility] DEP     Last appt 2/27/25

## 2025-03-12 ENCOUNTER — OFFICE VISIT (OUTPATIENT)
Dept: ENT CLINIC | Age: 80
End: 2025-03-12

## 2025-03-12 ENCOUNTER — PREP FOR PROCEDURE (OUTPATIENT)
Dept: ENT CLINIC | Age: 80
End: 2025-03-12

## 2025-03-12 VITALS
HEIGHT: 67 IN | DIASTOLIC BLOOD PRESSURE: 79 MMHG | BODY MASS INDEX: 23.76 KG/M2 | WEIGHT: 151.4 LBS | TEMPERATURE: 96.9 F | SYSTOLIC BLOOD PRESSURE: 178 MMHG | HEART RATE: 65 BPM

## 2025-03-12 DIAGNOSIS — D35.1 PARATHYROID ADENOMA: Primary | ICD-10-CM

## 2025-03-12 DIAGNOSIS — D35.1 PARATHYROID ADENOMA: ICD-10-CM

## 2025-03-12 ASSESSMENT — ENCOUNTER SYMPTOMS
EYE ITCHING: 0
TROUBLE SWALLOWING: 0
SINUS PAIN: 0
EYE PAIN: 0
SINUS PRESSURE: 0
CHOKING: 0
SORE THROAT: 0
VOICE CHANGE: 0
SHORTNESS OF BREATH: 0
DIARRHEA: 0
COUGH: 0
FACIAL SWELLING: 0
EYE REDNESS: 0
NAUSEA: 0
RHINORRHEA: 0

## 2025-03-12 NOTE — PROGRESS NOTES
Subjective:      Patient ID: Ayaka Arevalo is a 79 y.o. female.    HPI  Chief Complaint   Patient presents with    hyperparathyroidism parathyroid adenoma       History of Present Illness  Head/Neck    Ayaka is a(n) 79 y.o. female who presents with hypercalcemia and hyperparathyroidism. Seen endocrinology. Nuc med parathyroid positive for right inferior. Here for surgical consideration.   Pain: No  Otalgia: No  Odynophagia: No  Dysphagia: No  Voice Changes: No  Lumps in neck: No  Modifying Factors: nn smoker  Associates Signs and Symptoms: none    Patient Active Problem List   Diagnosis    Primary hypertension    Gastroesophageal reflux disease    Hyperlipidemia    CKD (chronic kidney disease) stage 2, GFR 60-89 ml/min    Fibromyalgia    Bilateral leg edema    Thrombocytopenia, unspecified    MARTÍNEZ (obstructive sleep apnea)    Bradycardia    Rheumatoid arthritis involving multiple sites with positive rheumatoid factor (HCC)    Parathyroid hormone excess    Chronic kidney disease, stage 3a (N18.31)     Past Surgical History:   Procedure Laterality Date    ARM SURGERY Right     tendonitis    BACK SURGERY Left 2022    back ablation    BLADDER SURGERY N/A 2019    lift    BREAST SURGERY      CAROTID ENDARTERECTOMY Right     COLONOSCOPY      ENDOSCOPY, COLON, DIAGNOSTIC      EYE SURGERY      HYSTERECTOMY (CERVIX STATUS UNKNOWN)      TONSILLECTOMY      UPPER GASTROINTESTINAL ENDOSCOPY N/A 11/16/2023    EGD BIOPSY performed by Torri Kline MD at [facility] ENDOSCOPY     Family History   Problem Relation Age of Onset    Cerebral palsy Brother     Colon Polyps Brother     Colon Polyps Brother      Social History     Socioeconomic History    Marital status:      Spouse name: Not on file    Number of children: Not on file    Years of education: Not on file    Highest education level: Not on file   Occupational History    Not on file   Tobacco Use    Smoking status: Former     Current packs/day: 0.00     Average

## 2025-04-03 ENCOUNTER — OFFICE VISIT (OUTPATIENT)
Dept: INTERNAL MEDICINE CLINIC | Age: 80
End: 2025-04-03
Payer: MEDICARE

## 2025-04-03 VITALS
DIASTOLIC BLOOD PRESSURE: 68 MMHG | WEIGHT: 153 LBS | HEIGHT: 67 IN | BODY MASS INDEX: 24.01 KG/M2 | SYSTOLIC BLOOD PRESSURE: 116 MMHG | OXYGEN SATURATION: 97 % | HEART RATE: 60 BPM

## 2025-04-03 DIAGNOSIS — E21.3 PARATHYROID HORMONE EXCESS: ICD-10-CM

## 2025-04-03 DIAGNOSIS — Z01.818 PRE-OP EXAM: ICD-10-CM

## 2025-04-03 DIAGNOSIS — D35.1 PARATHYROID ADENOMA: Primary | ICD-10-CM

## 2025-04-03 LAB
ANION GAP SERPL CALCULATED.3IONS-SCNC: 7 MMOL/L (ref 3–16)
BASOPHILS # BLD: 0 K/UL (ref 0–0.2)
BASOPHILS NFR BLD: 1 %
BUN SERPL-MCNC: 13 MG/DL (ref 7–20)
CALCIUM SERPL-MCNC: 10.2 MG/DL (ref 8.3–10.6)
CHLORIDE SERPL-SCNC: 105 MMOL/L (ref 99–110)
CO2 SERPL-SCNC: 28 MMOL/L (ref 21–32)
CREAT SERPL-MCNC: 1.1 MG/DL (ref 0.6–1.2)
DEPRECATED RDW RBC AUTO: 13 % (ref 12.4–15.4)
EOSINOPHIL # BLD: 0.1 K/UL (ref 0–0.6)
EOSINOPHIL NFR BLD: 2.1 %
GFR SERPLBLD CREATININE-BSD FMLA CKD-EPI: 50 ML/MIN/{1.73_M2}
GLUCOSE SERPL-MCNC: 91 MG/DL (ref 70–99)
HCT VFR BLD AUTO: 38.9 % (ref 36–48)
HGB BLD-MCNC: 13.3 G/DL (ref 12–16)
LYMPHOCYTES # BLD: 0.7 K/UL (ref 1–5.1)
LYMPHOCYTES NFR BLD: 17.5 %
MCH RBC QN AUTO: 29.3 PG (ref 26–34)
MCHC RBC AUTO-ENTMCNC: 34.1 G/DL (ref 31–36)
MCV RBC AUTO: 86.1 FL (ref 80–100)
MONOCYTES # BLD: 0.3 K/UL (ref 0–1.3)
MONOCYTES NFR BLD: 6.8 %
NEUTROPHILS # BLD: 2.9 K/UL (ref 1.7–7.7)
NEUTROPHILS NFR BLD: 72.6 %
PLATELET # BLD AUTO: 122 K/UL (ref 135–450)
PMV BLD AUTO: 8 FL (ref 5–10.5)
POTASSIUM SERPL-SCNC: 3.8 MMOL/L (ref 3.5–5.1)
RBC # BLD AUTO: 4.52 M/UL (ref 4–5.2)
SODIUM SERPL-SCNC: 140 MMOL/L (ref 136–145)
WBC # BLD AUTO: 4 K/UL (ref 4–11)

## 2025-04-03 PROCEDURE — G8399 PT W/DXA RESULTS DOCUMENT: HCPCS | Performed by: NURSE PRACTITIONER

## 2025-04-03 PROCEDURE — 1090F PRES/ABSN URINE INCON ASSESS: CPT | Performed by: NURSE PRACTITIONER

## 2025-04-03 PROCEDURE — G8420 CALC BMI NORM PARAMETERS: HCPCS | Performed by: NURSE PRACTITIONER

## 2025-04-03 PROCEDURE — 1160F RVW MEDS BY RX/DR IN RCRD: CPT | Performed by: NURSE PRACTITIONER

## 2025-04-03 PROCEDURE — 3078F DIAST BP <80 MM HG: CPT | Performed by: NURSE PRACTITIONER

## 2025-04-03 PROCEDURE — 3074F SYST BP LT 130 MM HG: CPT | Performed by: NURSE PRACTITIONER

## 2025-04-03 PROCEDURE — 1124F ACP DISCUSS-NO DSCNMKR DOCD: CPT | Performed by: NURSE PRACTITIONER

## 2025-04-03 PROCEDURE — 1159F MED LIST DOCD IN RCRD: CPT | Performed by: NURSE PRACTITIONER

## 2025-04-03 PROCEDURE — 1036F TOBACCO NON-USER: CPT | Performed by: NURSE PRACTITIONER

## 2025-04-03 PROCEDURE — G8427 DOCREV CUR MEDS BY ELIG CLIN: HCPCS | Performed by: NURSE PRACTITIONER

## 2025-04-03 PROCEDURE — 99214 OFFICE O/P EST MOD 30 MIN: CPT | Performed by: NURSE PRACTITIONER

## 2025-04-03 PROCEDURE — 93000 ELECTROCARDIOGRAM COMPLETE: CPT | Performed by: NURSE PRACTITIONER

## 2025-04-03 NOTE — PATIENT INSTRUCTIONS
Take atenolol, felodipine, hydralazine and clonidine on morning of surgery with sip of water, and hold all other medications until after surgery. Do NOT take losartan on morning of surgery.

## 2025-04-03 NOTE — PROGRESS NOTES
tablet Take 1 tablet by mouth daily      atenolol (TENORMIN) 25 MG tablet Take 1 tablet by mouth daily      hydroxychloroquine (PLAQUENIL) 200 MG tablet TAKE 1 TAB BY MOUTH DAILY 30 tablet 0    TURMERIC CURCUMIN PO Take 2 capsules by mouth daily      acetaminophen (TYLENOL) 325 MG tablet Take 2 tablets by mouth every 4 hours as needed      Echinacea 380 MG CAPS Take 2 capsules by mouth daily      loratadine (CLARITIN) 10 MG tablet Take 1 tablet by mouth daily         This patient presents to the office today for a preoperative consultation at the request of surgeon, Dr. Sanford, who plans on performing parathyroidectomy on April 25 at [facility].      Planned anesthesia: General   Known anesthesia problems: None   Bleeding risk: No recent or remote history of abnormal bleeding  Personal or FH of DVT/PE: No    Patient objection to receiving blood products: No    Patient Active Problem List   Diagnosis    Primary hypertension    Gastroesophageal reflux disease    Hyperlipidemia    CKD (chronic kidney disease) stage 2, GFR 60-89 ml/min    Fibromyalgia    Bilateral leg edema    Thrombocytopenia, unspecified    MARTÍNEZ (obstructive sleep apnea)    Bradycardia    Rheumatoid arthritis involving multiple sites with positive rheumatoid factor (HCC)    Parathyroid hormone excess    Chronic kidney disease, stage 3a (N18.31)    Parathyroid adenoma       Past Medical History:   Diagnosis Date    Arthritis     Asthma     Cancer (HCC)     Breast    Chronic kidney disease     Fibromyalgia     Hx of blood clots     Hyperlipidemia     Hypertension     Parathyroid hormone excess 01/20/2025    Shingles      Past Surgical History:   Procedure Laterality Date    ARM SURGERY Right     tendonitis    BACK SURGERY Left 2022    back ablation    BLADDER SURGERY N/A 2019    lift    BREAST SURGERY      CAROTID ENDARTERECTOMY Right     COLONOSCOPY      ENDOSCOPY, COLON, DIAGNOSTIC      EYE SURGERY      HYSTERECTOMY (CERVIX

## 2025-04-04 ENCOUNTER — RESULTS FOLLOW-UP (OUTPATIENT)
Dept: INTERNAL MEDICINE CLINIC | Age: 80
End: 2025-04-04

## 2025-04-21 RX ORDER — SODIUM CHLORIDE 0.9 % (FLUSH) 0.9 %
5-40 SYRINGE (ML) INJECTION EVERY 12 HOURS SCHEDULED
Status: CANCELLED | OUTPATIENT
Start: 2025-04-25

## 2025-04-21 RX ORDER — SODIUM CHLORIDE 0.9 % (FLUSH) 0.9 %
5-40 SYRINGE (ML) INJECTION PRN
Status: CANCELLED | OUTPATIENT
Start: 2025-04-25

## 2025-04-21 RX ORDER — SODIUM CHLORIDE 9 MG/ML
INJECTION, SOLUTION INTRAVENOUS PRN
Status: CANCELLED | OUTPATIENT
Start: 2025-04-25

## 2025-04-21 NOTE — PROGRESS NOTES
4/21/2025 1144 AM:    LMOR W/INSTRUCTIONS AND SENT TO EMAIL ON FILE AND LMOR REQUESTING PT TO RETURN CALL TO [facility] PAT/TS    LABS AND EKG TRACING IN EPIC 4/3/25, AND H&PIN EPIC NOTES FILE DATE 4/4/26324/TS    4/21/2025 1159 AM:    PT RETURNED CALL AND TI COMPLETED- including review of medications, allergies, medical history, need for ride home from family/friend/caregiver, needs caregiver for at least 24 hours after procedure,  notify surgeon office of any changes in health and follow all instructions from surgeon office/ts.     CBC/CMP ROUTED TO DR CONROY

## 2025-04-21 NOTE — PROGRESS NOTES
4/21/2025 1157 AM:        [facility] PRE-SURGICAL TESTING INSTRUCTIONS                      PRIOR TO PROCEDURE DATE:    1. PLEASE FOLLOW ANY INSTRUCTIONS GIVEN TO YOU PER YOUR SURGEON, INCLUDING ARRIVAL TIME.      2. Arrange for someone to drive you home and be with you for the first 24 hours after discharge for your safety after your procedure for which you received sedation. Ensure it is someone we can share information with regarding your discharge. Contact surgeon's office for arrival/procedure time.     NOTE: At this time ONLY 2 ADULTS may accompany you. NO CHILDREN UNDER AGE OF 16.    One person ENCOURAGED to stay at hospital entire time if outpatient surgery      3. You must contact your surgeon for instructions IF:  You are taking any blood thinners, aspirin, anti-inflammatory or vitamins.   Contact your ordering physician/surgeon for prescription medication instructions as soon as possible, especially if taking blood thinners, aspirin, heart, or diabetic medication.   There is a change in your physical condition such as a cold, fever, rash, cuts, sores, or any other infection, especially near your surgical site.    4. Do not drink alcohol the day before or day of your procedure.  Do not use any marijuana at least 24 hours or street drugs (heroin, cocaine) at minimum 5 days prior to your procedure.     5. A Pre-Surgical History and Physical MUST be completed WITHIN 30 DAYS OR LESS prior to your procedure.by your Physician or an Urgent Care        THE DAY OF YOUR PROCEDURE:  1.  Follow instructions for ARRIVAL TIME as DIRECTED BY YOUR SURGEON. [address] [address]     2. Enter the MAIN entrance from [facility] and follow the signs to the free Parking Garage or  Parking (offered free of charge 7 am-5pm).      3. Enter the Main Entrance of the hospital (do not enter from the lower level of the parking garage). Upon entrance, check in with the   surgery:   Take a shower with an antibacterial soap (i.e., Safeguard or Dial) OR your physician may have instructed you to use Hibiclens.  Dress in loose, comfortable clothing appropriate for redressing after your procedure.   Do not wear jewelry (including body piercings), make-up (especially NO eye make-up), fingernail polish (NO toenail polish if foot/leg surgery), lotion, powders, or metal hairclips.   Do not shave or wax for 72 hours prior to procedure near your operative site. Shaving with a razor can irritate your skin and make it easier to develop an infection. On the day of your procedure, any hair that needs to be removed near the surgical site will be 'clipped' by a healthcare worker using a special clipper designed to avoid skin irritation.    8. Dentures, glasses, or contacts will need to be removed before your procedure. Bring cases for your glasses, contacts, dentures, or hearing aids to protect them while you are in surgery.      9. If you use a CPAP, please bring it with you on the day of your procedure.    10. If you use oxygen at home, please bring your oxygen tank with you to hospital..     11. We recommend that valuable personal belongings such as cash, cell phones, e-tablets, or jewelry, be left at home during your stay. The hospital will not be responsible for valuables that are not secured in the hospital safe. However, if your insurance requires a co-pay, you may want to bring a method of payment, i.e., Check or credit card, if you wish to pay your co-pay the day of surgery.      12. If you are to stay overnight, you may bring a bag with personal items. Please have any large items you may need brought in by your family after your arrival to your hospital room.    13. If you have a Living Will or Durable Power of , please bring a copy on the day of your procedure.     How we keep you safe and work to prevent surgical site infections:   1. Health care workers should always check your

## 2025-04-21 NOTE — PROGRESS NOTES
4/21/2025 1142 AM:    PRE-OP INSTRUCTIONS FOR SURGICAL PATIENTS          Our Pre-admission Testing Nurses tried and were unable to reach you today.  Please read the attached instructions AND listen to your voicemail. PLEASE CALL [phone].    Follow all instructions provided to you from your surgeon's office, including your ARRIVAL TIME.   Arrange for someone to drive you home and be with you for the first 24 hours after discharge.     NOTE: at this time ONLY 2 ADULTS may accompany you. NO CHILDREN UNDER THE AGE OF 16.    One person encouraged to stay at hospital entire time if outpatient surgery  Enter the MAIN entrance located on [address] and report to the surgical desk on the LEFT side of the lobby. Please park in the parking garage or there is free  Parking available after 7am for your use.    Bring your insurance card & photo ID with you to register.  Bring your medication list with you with dose and frequency listed (including over the counter medications)  Contact your ordering physician/surgeon for medication instructions as soon as possible, especially if taking blood thinners, aspirin, heart, or diabetic medication.   STOP VITAMINS/SUPPLEMENTS/NON-STEROIDAL ANTI-INFLAMMATORY MEDICATIONS 7 DAYS PRIOR TO PROCEDURE.   Bariatric surgical patients need to call your surgeon if on diabetic medications (as some may need to be stopped 1-week preop)  A Pre-Surgical History and Physical MUST be completed WITHIN 30 DAYS OR LESS prior to your procedure by your Physician or an Urgent Care.  DO NOT EAT ANYTHING 8 hours prior to arrival for surgery.  You may have sips of WATER ONLY (up to 8 ounces) 4 hours prior to your arrival for surgery. Then nothing further 4 hours prior to arriving at hospital.   NOTE: ALL Gastric, Bariatric & Bowel surgery patients - you MUST follow your surgeon's instructions regarding eating/drinking as you will have very specific instructions to follow.  If you did not receive  these, call your surgeon's office immediately.   IF TAKING CLONIDINE, ATENOLOL IN AM, TAKE DAY OF PROCEDURE WITH SIP OF WATER, MAY TAKE ZOLOFT, PROTONIX, BRING INHALER  No gum, candy, mints, or ice chips day of procedure.   Please refrain from drinking alcohol the day before or day of your procedure.   Do not use any recreational marijuana at least 24 hours or street drugs (heroin, cocaine) at minimum 5 days prior to your procedure.   Please do not smoke the day of your procedure.    Dress in loose, comfortable clothing appropriate for redressing after your procedure.   Do not wear jewelry (including body piercings), make-up, fingernail polish, lotion, powders, or metal hairclips. IF INSTRUCTED BY SURGEON'S OFFICE, SHOWER WITH HIBICLENS, OTHERWISE SHOWER WITH ANTIBACTERIAL SOAP THE NIGHT BEFORE AND DAY OF SERVICE.    Contacts, glasses, dentures, and hearing aids will need to be removed prior to surgery.  Bring your case(s) to protect them while you are in surgery.    If you use a CPAP, please bring it with you on the day of your procedure.  If you use oxygen at home, please bring your oxygen tank with you to hospital.  Do not shave or wax for 72 hours prior to procedure near your operative site.  Leave all other valuables at home.  IF there is a change in your physical condition for some reason, such as: a cold, fever, rash, cuts, sores, or any other infection, especially near your surgical site, contact your surgeon's office immediately.   Instructions left on patient's voicemail AND SENT TO EMAIL ON FILE.  Celena Resendez RN.4/21/2025 .11:42 AM

## 2025-04-23 ENCOUNTER — TELEPHONE (OUTPATIENT)
Dept: ENT CLINIC | Age: 80
End: 2025-04-23

## 2025-04-23 ENCOUNTER — OFFICE VISIT (OUTPATIENT)
Age: 80
End: 2025-04-23
Payer: MEDICARE

## 2025-04-23 VITALS
SYSTOLIC BLOOD PRESSURE: 136 MMHG | TEMPERATURE: 98 F | HEART RATE: 70 BPM | RESPIRATION RATE: 16 BRPM | DIASTOLIC BLOOD PRESSURE: 70 MMHG | HEIGHT: 67 IN | BODY MASS INDEX: 24.08 KG/M2 | OXYGEN SATURATION: 98 % | WEIGHT: 153.4 LBS

## 2025-04-23 DIAGNOSIS — E21.3 PARATHYROID HORMONE EXCESS: Primary | ICD-10-CM

## 2025-04-23 PROCEDURE — 3075F SYST BP GE 130 - 139MM HG: CPT | Performed by: HOSPITALIST

## 2025-04-23 PROCEDURE — 1160F RVW MEDS BY RX/DR IN RCRD: CPT | Performed by: HOSPITALIST

## 2025-04-23 PROCEDURE — G8427 DOCREV CUR MEDS BY ELIG CLIN: HCPCS | Performed by: HOSPITALIST

## 2025-04-23 PROCEDURE — 1036F TOBACCO NON-USER: CPT | Performed by: HOSPITALIST

## 2025-04-23 PROCEDURE — 1090F PRES/ABSN URINE INCON ASSESS: CPT | Performed by: HOSPITALIST

## 2025-04-23 PROCEDURE — G8420 CALC BMI NORM PARAMETERS: HCPCS | Performed by: HOSPITALIST

## 2025-04-23 PROCEDURE — 99213 OFFICE O/P EST LOW 20 MIN: CPT | Performed by: HOSPITALIST

## 2025-04-23 PROCEDURE — 3078F DIAST BP <80 MM HG: CPT | Performed by: HOSPITALIST

## 2025-04-23 PROCEDURE — 1159F MED LIST DOCD IN RCRD: CPT | Performed by: HOSPITALIST

## 2025-04-23 PROCEDURE — G8399 PT W/DXA RESULTS DOCUMENT: HCPCS | Performed by: HOSPITALIST

## 2025-04-23 PROCEDURE — 1124F ACP DISCUSS-NO DSCNMKR DOCD: CPT | Performed by: HOSPITALIST

## 2025-04-23 NOTE — PATIENT INSTRUCTIONS
-Adequate hydration    -Schedule for parathyroidectomy on 4/25/2025    -Continue to take adequate dietary calcium and vitamin D 1000 units daily as advised    -In general please avoid falls, use night lights at night.     -Follow up in 4 weeks for postop check

## 2025-04-23 NOTE — TELEPHONE ENCOUNTER
Called and left message on patient VM,Your surgery date is 04/25/2025, the arrival time is 08:30 AM, for surgery time of 10:30 AM. Please call our office should you have any questions at [phone].

## 2025-04-23 NOTE — PROGRESS NOTES
Subjective:      Ayaka Arevalo, 80 y.o. female, who is here for follow-up of elevated PTH and calcium levels.  Patient preferred name is Laurel.    Patient has CKD stage III and follows with nephrology.  At her visit in December 2024 she was noted to have hypercalcemia of 10.6.  Hence workup was done as below.   Latest Reference Range & Units 12/06/24 09:13   Creatinine 0.60 - 1.20 mg/dL 1.22 (H)      Latest Reference Range & Units 12/06/24 09:13   eGFR (CKD-EPI) >59 mL/min/1.73 m2 45 (L)      Latest Reference Range & Units 12/06/24 09:13   Calcium 8.5 - 10.4 mg/dL 10.6 (H)      Latest Reference Range & Units 12/06/24 09:14   Calcium, Ionized 4.6 - 5.4 mg/dL 5.5 (H)        Latest Reference Range & Units 12/06/24 09:13   Parathyroid Hormone 15.00 - 65.00 pg/mL 145.00 (H)      Latest Reference Range & Units 10/12/24 08:54   Vit D, 25-Hydroxy 30.0 - 150.0 ng/mL 43.7      Latest Reference Range & Units 12/06/24 09:13   Phosphorus 2.5 - 4.5 mg/dL 3.7      Latest Reference Range & Units 12/06/24 09:13   Magnesium 1.7 - 2.4 mg/dL 2.2      Latest Reference Range & Units 12/06/24 09:13   Albumin 3.5 - 5.7 g/dL 4.3      Latest Reference Range & Units 08/02/24 09:19   TSH, 3rd Generation 0.27 - 4.20 uIU/mL 1.82     UPEP showed no evidence of monoclonal protein.  Given the elevated calcium along with PTH nephrology suspected primary hyperparathyroidism.  They obtained NM parathyroid scan which showed findings suspicious for parathyroid adenoma at the inferior right thyroid bed in the appropriate clinical setting.  Given this she was referred to us for further evaluation.      she was initially told has elevated PTH levels in April/2024.    Looking at the records it appears they have had elevated calcium at least since March 2023 when it was noted to be 10.8.  The highest has been 11.5 in April 2024.  I do not see reports of 24-hour urinary studies.    Regards to hyperparathyroidism she denies history of fractures or recent

## 2025-04-24 NOTE — BRIEF OP NOTE
Brief Postoperative Note      Patient: Ayaka Arevalo  YOB: 1945  MRN: [id number]    Date of Procedure: 4/25/2025    Pre-Op Diagnosis Codes:      * Parathyroid adenoma [D35.1]    Post-Op Diagnosis: Same       Procedure(s):  PARATHYROIDECTOMY    Surgeon(s):  Ernesto Sanford MD    Assistant:  First Assistant: (Unknown)    Anesthesia: General    Estimated Blood Loss (mL): Minimal    Complications: None    Specimens:   * No specimens in log *    Implants:  * No surgical log found *      Drains: * No LDAs found *    Findings:  Infection Present At Time Of Surgery (PATOS) (choose all levels that have infection present):  No infection present  Other Findings: Right inferior parathyroid adenoma    Electronically signed by Ernesto Sanford MD on 4/24/2025 at 7:46 PM

## 2025-04-24 NOTE — OP NOTE
Operative Note      Patient Name: Ayaka Arevalo  YOB: 1945  Medical Record Number:  [id number]  Billing Number:  [id number]  Date of Procedure: 4/24/2025  Time: 1030      DATE OF SURGERY:  4/24/2025     ATTENDING SURGEON:  Ernesto Sanford MD PHD  ASSISTANT: Circulator: (Unknown)  First Assistant: (Unknown)  Scrub Person First: (Unknown)  Circulator Assist: (Unknown)    PREOPERATIVE DIAGNOSES:  Hyperparahyroidism    POSTOPERATIVE DIAGNOSES:  same as pre-op    PROCEDURE(S) PERFORMED:  Procedure(s):  Parathyroidectomy (32982)  Continuous Laryngeal Nerve Integrity Monitoring (26681)    ESTIMATED BLOOD LOSS:  less than 10 mL     SPECIMENS:    ID  Type  Source  Tests  Collected by  Time  Destination    A : Parathyroid Tissue  Right inferior Parathyroid SURGICAL PATHOLOGY EXAM  Ernesto Sanford MD PHD  4/24/2025          COMPLICATIONS:  None.    ANESTHESIA:  General            Indications:  This is a 80 y.o. female with a history of hyperparathyroidism.    After a discussion of risks, benefits, and alternate treatment options, the patient elected to   proceed with left khoi- possible total thyroidectomy.  Informed consent obtained.      DETAILS OF PROCEDURE(S):        The patient was brought to the operating room,    placed in a supine position, and induced and intubated per anesthesia. The    patient was intubated with a  Xomed nerve integrity monitoring    endotracheal tube. Direct laryngoscopy confirmed accurate placement of the    EMG contact electrodes to the vocalis muscles of the endolarynx bilaterally.    Subdermal ground electrodes were placed and all electrodes hooked up to the    nerve monitor, which was turned on and set for continuous laryngeal nerve    monitoring for the remainder of the  procedure. Only a short-acting    muscle relaxant was used for intubation, no further muscle relaxant given,    and no topical laryngeal anesthetic was used.        After confirming the correct

## 2025-04-24 NOTE — DISCHARGE INSTRUCTIONS
information was reviewed with the patient/significant other:  [x]Procedure/physician specific instructions  [x]Medication information sheet(S) including potential side effects  [x]FAQ Surgical Site Infections      I have read and understand the instructions given to me: ____________________________________________   (Patient/S.O. Signature)            Date/time 4/25/2025 12:46 PM                 If you smoke STOP. We care about your health!

## 2025-04-25 ENCOUNTER — ANESTHESIA (OUTPATIENT)
Dept: OPERATING ROOM | Age: 80
End: 2025-04-25
Payer: MEDICARE

## 2025-04-25 ENCOUNTER — ANESTHESIA EVENT (OUTPATIENT)
Dept: OPERATING ROOM | Age: 80
End: 2025-04-25
Payer: MEDICARE

## 2025-04-25 ENCOUNTER — HOSPITAL ENCOUNTER (OUTPATIENT)
Age: 80
Setting detail: OUTPATIENT SURGERY
Discharge: HOME | End: 2025-04-25
Attending: OTOLARYNGOLOGY | Admitting: OTOLARYNGOLOGY
Payer: MEDICARE

## 2025-04-25 VITALS
OXYGEN SATURATION: 92 % | RESPIRATION RATE: 14 BRPM | WEIGHT: 151.8 LBS | DIASTOLIC BLOOD PRESSURE: 77 MMHG | TEMPERATURE: 97 F | SYSTOLIC BLOOD PRESSURE: 152 MMHG | HEART RATE: 72 BPM | HEIGHT: 67 IN | BODY MASS INDEX: 23.83 KG/M2

## 2025-04-25 DIAGNOSIS — D35.1 PARATHYROID ADENOMA: ICD-10-CM

## 2025-04-25 LAB
PTH-INTACT SERPL-MCNC: 170 PG/ML (ref 14–72)
PTH-INTACT SERPL-MCNC: 48 PG/ML (ref 14–72)

## 2025-04-25 PROCEDURE — 3700000001 HC ADD 15 MINUTES (ANESTHESIA): Performed by: OTOLARYNGOLOGY

## 2025-04-25 PROCEDURE — 7100000011 HC PHASE II RECOVERY - ADDTL 15 MIN: Performed by: OTOLARYNGOLOGY

## 2025-04-25 PROCEDURE — 2709999900 HC NON-CHARGEABLE SUPPLY: Performed by: OTOLARYNGOLOGY

## 2025-04-25 PROCEDURE — 6360000002 HC RX W HCPCS: Performed by: ANESTHESIOLOGY

## 2025-04-25 PROCEDURE — 83970 ASSAY OF PARATHORMONE: CPT

## 2025-04-25 PROCEDURE — 3700000000 HC ANESTHESIA ATTENDED CARE: Performed by: OTOLARYNGOLOGY

## 2025-04-25 PROCEDURE — 6360000002 HC RX W HCPCS: Performed by: OTOLARYNGOLOGY

## 2025-04-25 PROCEDURE — 3600000004 HC SURGERY LEVEL 4 BASE: Performed by: OTOLARYNGOLOGY

## 2025-04-25 PROCEDURE — 88305 TISSUE EXAM BY PATHOLOGIST: CPT

## 2025-04-25 PROCEDURE — 2720000010 HC SURG SUPPLY STERILE: Performed by: OTOLARYNGOLOGY

## 2025-04-25 PROCEDURE — 2580000003 HC RX 258: Performed by: ANESTHESIOLOGY

## 2025-04-25 PROCEDURE — 2500000003 HC RX 250 WO HCPCS

## 2025-04-25 PROCEDURE — 6370000000 HC RX 637 (ALT 250 FOR IP): Performed by: OTOLARYNGOLOGY

## 2025-04-25 PROCEDURE — 7100000000 HC PACU RECOVERY - FIRST 15 MIN: Performed by: OTOLARYNGOLOGY

## 2025-04-25 PROCEDURE — 3600000014 HC SURGERY LEVEL 4 ADDTL 15MIN: Performed by: OTOLARYNGOLOGY

## 2025-04-25 PROCEDURE — 2500000003 HC RX 250 WO HCPCS: Performed by: OTOLARYNGOLOGY

## 2025-04-25 PROCEDURE — 88331 PATH CONSLTJ SURG 1 BLK 1SPC: CPT

## 2025-04-25 PROCEDURE — 6360000002 HC RX W HCPCS

## 2025-04-25 PROCEDURE — 7100000010 HC PHASE II RECOVERY - FIRST 15 MIN: Performed by: OTOLARYNGOLOGY

## 2025-04-25 PROCEDURE — 7100000001 HC PACU RECOVERY - ADDTL 15 MIN: Performed by: OTOLARYNGOLOGY

## 2025-04-25 PROCEDURE — 2580000003 HC RX 258

## 2025-04-25 RX ORDER — SODIUM CHLORIDE 0.9 % (FLUSH) 0.9 %
5-40 SYRINGE (ML) INJECTION EVERY 12 HOURS SCHEDULED
Status: DISCONTINUED | OUTPATIENT
Start: 2025-04-25 | End: 2025-04-25 | Stop reason: HOSPADM

## 2025-04-25 RX ORDER — PROPOFOL 10 MG/ML
INJECTION, EMULSION INTRAVENOUS
Status: DISCONTINUED | OUTPATIENT
Start: 2025-04-25 | End: 2025-04-25 | Stop reason: SDUPTHER

## 2025-04-25 RX ORDER — DEXAMETHASONE SODIUM PHOSPHATE 4 MG/ML
INJECTION, SOLUTION INTRA-ARTICULAR; INTRALESIONAL; INTRAMUSCULAR; INTRAVENOUS; SOFT TISSUE
Status: DISCONTINUED | OUTPATIENT
Start: 2025-04-25 | End: 2025-04-25 | Stop reason: SDUPTHER

## 2025-04-25 RX ORDER — LORAZEPAM 2 MG/ML
0.5 INJECTION INTRAMUSCULAR
Status: DISCONTINUED | OUTPATIENT
Start: 2025-04-25 | End: 2025-04-25 | Stop reason: HOSPADM

## 2025-04-25 RX ORDER — HYDROMORPHONE HYDROCHLORIDE 1 MG/ML
0.5 INJECTION, SOLUTION INTRAMUSCULAR; INTRAVENOUS; SUBCUTANEOUS EVERY 5 MIN PRN
Status: DISCONTINUED | OUTPATIENT
Start: 2025-04-25 | End: 2025-04-25 | Stop reason: HOSPADM

## 2025-04-25 RX ORDER — FENTANYL CITRATE 50 UG/ML
INJECTION, SOLUTION INTRAMUSCULAR; INTRAVENOUS
Status: DISCONTINUED | OUTPATIENT
Start: 2025-04-25 | End: 2025-04-25 | Stop reason: SDUPTHER

## 2025-04-25 RX ORDER — GLYCOPYRROLATE 0.2 MG/ML
INJECTION INTRAMUSCULAR; INTRAVENOUS
Status: DISCONTINUED | OUTPATIENT
Start: 2025-04-25 | End: 2025-04-25 | Stop reason: SDUPTHER

## 2025-04-25 RX ORDER — LABETALOL HYDROCHLORIDE 5 MG/ML
10 INJECTION, SOLUTION INTRAVENOUS
Status: DISCONTINUED | OUTPATIENT
Start: 2025-04-25 | End: 2025-04-25 | Stop reason: HOSPADM

## 2025-04-25 RX ORDER — SUCCINYLCHOLINE/SOD CL,ISO/PF 200MG/10ML
SYRINGE (ML) INTRAVENOUS
Status: DISCONTINUED | OUTPATIENT
Start: 2025-04-25 | End: 2025-04-25 | Stop reason: SDUPTHER

## 2025-04-25 RX ORDER — NALOXONE HYDROCHLORIDE 0.4 MG/ML
INJECTION, SOLUTION INTRAMUSCULAR; INTRAVENOUS; SUBCUTANEOUS PRN
Status: DISCONTINUED | OUTPATIENT
Start: 2025-04-25 | End: 2025-04-25 | Stop reason: HOSPADM

## 2025-04-25 RX ORDER — LIDOCAINE HYDROCHLORIDE AND EPINEPHRINE 10; 10 MG/ML; UG/ML
INJECTION, SOLUTION INFILTRATION; PERINEURAL PRN
Status: DISCONTINUED | OUTPATIENT
Start: 2025-04-25 | End: 2025-04-25 | Stop reason: HOSPADM

## 2025-04-25 RX ORDER — LIDOCAINE HYDROCHLORIDE 20 MG/ML
INJECTION, SOLUTION INTRAVENOUS
Status: DISCONTINUED | OUTPATIENT
Start: 2025-04-25 | End: 2025-04-25 | Stop reason: SDUPTHER

## 2025-04-25 RX ORDER — SODIUM CHLORIDE 0.9 % (FLUSH) 0.9 %
5-40 SYRINGE (ML) INJECTION PRN
Status: DISCONTINUED | OUTPATIENT
Start: 2025-04-25 | End: 2025-04-25 | Stop reason: HOSPADM

## 2025-04-25 RX ORDER — IPRATROPIUM BROMIDE AND ALBUTEROL SULFATE 2.5; .5 MG/3ML; MG/3ML
1 SOLUTION RESPIRATORY (INHALATION)
Status: DISCONTINUED | OUTPATIENT
Start: 2025-04-25 | End: 2025-04-25 | Stop reason: HOSPADM

## 2025-04-25 RX ORDER — OXYCODONE HYDROCHLORIDE 5 MG/1
5 TABLET ORAL EVERY 6 HOURS PRN
Qty: 10 TABLET | Refills: 0 | Status: SHIPPED | OUTPATIENT
Start: 2025-04-25 | End: 2025-04-29

## 2025-04-25 RX ORDER — OXYCODONE HYDROCHLORIDE 5 MG/1
5 TABLET ORAL
Status: DISCONTINUED | OUTPATIENT
Start: 2025-04-25 | End: 2025-04-25 | Stop reason: HOSPADM

## 2025-04-25 RX ORDER — FENTANYL CITRATE 50 UG/ML
25 INJECTION, SOLUTION INTRAMUSCULAR; INTRAVENOUS EVERY 5 MIN PRN
Status: DISCONTINUED | OUTPATIENT
Start: 2025-04-25 | End: 2025-04-25 | Stop reason: HOSPADM

## 2025-04-25 RX ORDER — ONDANSETRON 2 MG/ML
INJECTION INTRAMUSCULAR; INTRAVENOUS
Status: DISCONTINUED | OUTPATIENT
Start: 2025-04-25 | End: 2025-04-25 | Stop reason: SDUPTHER

## 2025-04-25 RX ORDER — SODIUM CHLORIDE 9 MG/ML
INJECTION, SOLUTION INTRAVENOUS PRN
Status: DISCONTINUED | OUTPATIENT
Start: 2025-04-25 | End: 2025-04-25 | Stop reason: HOSPADM

## 2025-04-25 RX ORDER — SODIUM CHLORIDE, SODIUM LACTATE, POTASSIUM CHLORIDE, CALCIUM CHLORIDE 600; 310; 30; 20 MG/100ML; MG/100ML; MG/100ML; MG/100ML
INJECTION, SOLUTION INTRAVENOUS
Status: DISCONTINUED | OUTPATIENT
Start: 2025-04-25 | End: 2025-04-25 | Stop reason: SDUPTHER

## 2025-04-25 RX ORDER — SODIUM CHLORIDE, SODIUM LACTATE, POTASSIUM CHLORIDE, CALCIUM CHLORIDE 600; 310; 30; 20 MG/100ML; MG/100ML; MG/100ML; MG/100ML
INJECTION, SOLUTION INTRAVENOUS CONTINUOUS
Status: DISCONTINUED | OUTPATIENT
Start: 2025-04-25 | End: 2025-04-25 | Stop reason: HOSPADM

## 2025-04-25 RX ORDER — PROCHLORPERAZINE EDISYLATE 5 MG/ML
5 INJECTION INTRAMUSCULAR; INTRAVENOUS
Status: DISCONTINUED | OUTPATIENT
Start: 2025-04-25 | End: 2025-04-25 | Stop reason: HOSPADM

## 2025-04-25 RX ORDER — ONDANSETRON 2 MG/ML
4 INJECTION INTRAMUSCULAR; INTRAVENOUS
Status: DISCONTINUED | OUTPATIENT
Start: 2025-04-25 | End: 2025-04-25 | Stop reason: HOSPADM

## 2025-04-25 RX ORDER — REMIFENTANIL HYDROCHLORIDE 1 MG/ML
INJECTION, POWDER, LYOPHILIZED, FOR SOLUTION INTRAVENOUS
Status: DISCONTINUED | OUTPATIENT
Start: 2025-04-25 | End: 2025-04-25 | Stop reason: SDUPTHER

## 2025-04-25 RX ORDER — DIPHENHYDRAMINE HYDROCHLORIDE 50 MG/ML
12.5 INJECTION, SOLUTION INTRAMUSCULAR; INTRAVENOUS
Status: DISCONTINUED | OUTPATIENT
Start: 2025-04-25 | End: 2025-04-25 | Stop reason: HOSPADM

## 2025-04-25 RX ADMIN — SODIUM CHLORIDE, SODIUM LACTATE, POTASSIUM CHLORIDE, AND CALCIUM CHLORIDE: .6; .31; .03; .02 INJECTION, SOLUTION INTRAVENOUS at 08:58

## 2025-04-25 RX ADMIN — PHENYLEPHRINE HYDROCHLORIDE 15 MCG/MIN: 10 INJECTION, SOLUTION INTRAVENOUS at 11:09

## 2025-04-25 RX ADMIN — FENTANYL CITRATE 50 MCG: 50 INJECTION, SOLUTION INTRAMUSCULAR; INTRAVENOUS at 10:17

## 2025-04-25 RX ADMIN — LIDOCAINE HYDROCHLORIDE 80 MG: 20 INJECTION, SOLUTION INTRAVENOUS at 10:17

## 2025-04-25 RX ADMIN — HYDROMORPHONE HYDROCHLORIDE 0.5 MG: 1 INJECTION, SOLUTION INTRAMUSCULAR; INTRAVENOUS; SUBCUTANEOUS at 12:25

## 2025-04-25 RX ADMIN — HYDROMORPHONE HYDROCHLORIDE 0.5 MG: 1 INJECTION, SOLUTION INTRAMUSCULAR; INTRAVENOUS; SUBCUTANEOUS at 12:30

## 2025-04-25 RX ADMIN — SODIUM CHLORIDE, SODIUM LACTATE, POTASSIUM CHLORIDE, AND CALCIUM CHLORIDE: .6; .31; .03; .02 INJECTION, SOLUTION INTRAVENOUS at 10:25

## 2025-04-25 RX ADMIN — SODIUM CHLORIDE, SODIUM LACTATE, POTASSIUM CHLORIDE, AND CALCIUM CHLORIDE: .6; .31; .03; .02 INJECTION, SOLUTION INTRAVENOUS at 10:12

## 2025-04-25 RX ADMIN — DEXAMETHASONE SODIUM PHOSPHATE 4 MG: 4 INJECTION INTRA-ARTICULAR; INTRALESIONAL; INTRAMUSCULAR; INTRAVENOUS; SOFT TISSUE at 10:42

## 2025-04-25 RX ADMIN — FENTANYL CITRATE 50 MCG: 50 INJECTION, SOLUTION INTRAMUSCULAR; INTRAVENOUS at 10:50

## 2025-04-25 RX ADMIN — PROPOFOL 80 MG: 10 INJECTION, EMULSION INTRAVENOUS at 10:17

## 2025-04-25 RX ADMIN — REMIFENTANIL HYDROCHLORIDE 0.15 MCG/KG/MIN: 1 INJECTION, POWDER, LYOPHILIZED, FOR SOLUTION INTRAVENOUS at 10:20

## 2025-04-25 RX ADMIN — WATER 2000 MG: 1 INJECTION INTRAMUSCULAR; INTRAVENOUS; SUBCUTANEOUS at 10:35

## 2025-04-25 RX ADMIN — ONDANSETRON 4 MG: 2 INJECTION INTRAMUSCULAR; INTRAVENOUS at 10:42

## 2025-04-25 RX ADMIN — HYDROMORPHONE HYDROCHLORIDE 0.25 MG: 1 INJECTION, SOLUTION INTRAMUSCULAR; INTRAVENOUS; SUBCUTANEOUS at 11:37

## 2025-04-25 RX ADMIN — PROPOFOL 100 MCG/KG/MIN: 10 INJECTION, EMULSION INTRAVENOUS at 10:20

## 2025-04-25 RX ADMIN — GLYCOPYRROLATE 0.2 MG: 0.2 INJECTION INTRAMUSCULAR; INTRAVENOUS at 11:10

## 2025-04-25 RX ADMIN — Medication 80 MG: at 10:17

## 2025-04-25 ASSESSMENT — PAIN SCALES - GENERAL
PAINLEVEL_OUTOF10: 8
PAINLEVEL_OUTOF10: 7
PAINLEVEL_OUTOF10: 3
PAINLEVEL_OUTOF10: 3
PAINLEVEL_OUTOF10: 0
PAINLEVEL_OUTOF10: 4

## 2025-04-25 ASSESSMENT — PAIN DESCRIPTION - DESCRIPTORS
DESCRIPTORS: DISCOMFORT
DESCRIPTORS: DISCOMFORT

## 2025-04-25 ASSESSMENT — PAIN DESCRIPTION - ORIENTATION
ORIENTATION: INNER;OUTER
ORIENTATION: INNER;OUTER

## 2025-04-25 ASSESSMENT — PAIN DESCRIPTION - LOCATION
LOCATION: THROAT

## 2025-04-25 ASSESSMENT — PAIN DESCRIPTION - ONSET
ONSET: ON-GOING
ONSET: ON-GOING

## 2025-04-25 ASSESSMENT — PAIN DESCRIPTION - FREQUENCY
FREQUENCY: CONTINUOUS
FREQUENCY: CONTINUOUS

## 2025-04-25 ASSESSMENT — PAIN DESCRIPTION - PAIN TYPE
TYPE: SURGICAL PAIN
TYPE: SURGICAL PAIN

## 2025-04-25 ASSESSMENT — LIFESTYLE VARIABLES: SMOKING_STATUS: 0

## 2025-04-25 ASSESSMENT — PAIN - FUNCTIONAL ASSESSMENT: PAIN_FUNCTIONAL_ASSESSMENT: 0-10

## 2025-04-25 NOTE — PROGRESS NOTES
Called and updated patient's friend, Ely, at 1310 on patient's status, plan moving forward for discharge as well as picking up pain prescription from [facility] pharmacy.

## 2025-04-25 NOTE — BRIEF OP NOTE
Brief Postoperative Note      Patient: Ayaka Arevalo  YOB: 1945  MRN: [id number]    Date of Procedure: 4/25/2025    Pre-Op Diagnosis Codes:      * Parathyroid adenoma [D35.1]    Post-Op Diagnosis: Same       Procedure(s):  PARATHYROIDECTOMY    Surgeon(s):  Ernesto Sanford MD    Assistant:  Resident: Inez Cantu DO    Anesthesia: General    Estimated Blood Loss (mL): Minimal    Complications: None    Specimens:   ID Type Source Tests Collected by Time Destination   1 : 1) STAT INTRA OP PTH Blood Blood PTH, INTRAOPERATIVE Ernesto Sanford MD 4/25/2025 1107    A : PARATHYROID TISSUE Tissue Tissue SURGICAL PATHOLOGY Ernesto Sanford MD 4/25/2025 1056    B : PARATHYROID TISSUE Tissue Tissue SURGICAL PATHOLOGY Ernesto Sanford MD 4/25/2025 1057        Implants:  * No implants in log *      Drains: * No LDAs found *    Findings:  Infection Present At Time Of Surgery (PATOS) (choose all levels that have infection present):  No infection present  Other Findings: uncomplicated inferior right parathyroidectomy; pre-operative  which decreased to 48 after removal of adenoma    Electronically signed by Inez Cantu DO on 4/25/2025 at 11:41 AM

## 2025-04-25 NOTE — PROGRESS NOTES
PACU Transfer to [facility]    Vitals:    04/25/25 1315   BP: (!) 140/78   Pulse: 78   Resp: 12   Temp: 97.2 °F (36.2 °C)   SpO2: 95%         Intake/Output Summary (Last 24 hours) at 4/25/2025 1331  Last data filed at 4/25/2025 1250  Gross per 24 hour   Intake 2000 ml   Output 500 ml   Net 1500 ml       Pain assessment:  present - adequately treated  Pain Level: 3    Patient transferred to care of RACHAEL RN.    4/25/2025 1:31 PM

## 2025-04-25 NOTE — ANESTHESIA POSTPROCEDURE EVALUATION
Department of Anesthesiology  Postprocedure Note    Patient: Ayaka Arevalo  MRN: [id number]  YOB: 1945  Date of evaluation: 4/25/2025    Procedure Summary       Date: 04/25/25 Room / Location: [place] [address] / [facility]    Anesthesia Start: 1012 Anesthesia Stop: 1154    Procedure: PARATHYROIDECTOMY (Neck) Diagnosis:       Parathyroid adenoma      (Parathyroid adenoma [D35.1])    Surgeons: Ernesto Sanford MD Responsible Provider: Joey Casarez MD    Anesthesia Type: general ASA Status: 3            Anesthesia Type: No value filed.    Emelia Phase I: Emelia Score: 10    Emelia Phase II:      Anesthesia Post Evaluation    Patient location during evaluation: PACU  Patient participation: complete - patient participated  Level of consciousness: awake  Airway patency: patent  Nausea & Vomiting: no nausea and no vomiting  Cardiovascular status: blood pressure returned to baseline and hemodynamically stable  Respiratory status: acceptable  Hydration status: euvolemic  Multimodal analgesia pain management approach  Pain management: adequate    No notable events documented.

## 2025-04-25 NOTE — PROGRESS NOTES
Patient admitted to PACU #16 from OR per stretcher at 1152 s/p PARATHYROIDECTOMY. Report received at bedside in PACU per CRNA, OR nurse and Dr. Cantu. Patient was reported to be hemodynamically stable during surgery with no complications. Patient connected to PACU monitoring equipment. IVF's infusing with site unremarkable. Patient arrived to PACU responsive but not fully wakeful from anesthesia with no difficulties noted and no pain verbalized. Anterior neck surgical dressing remains C,D,I with no drainage noted with surgical glue. No further changes. Will continue to monitor.

## 2025-04-25 NOTE — H&P
Update History & Physical     The patient's History and Physical of 4-3-2025 was reviewed with the patient and I examined the patient. There was no change. The surgical site was confirmed by the patient and me.      Plan: The risks, benefits, expected outcome, and alternative to the recommended procedure have been discussed with the patient. Patient understands and wants to proceed with the procedure.

## 2025-04-25 NOTE — ANESTHESIA PRE PROCEDURE
Department of Anesthesiology  Preprocedure Note       Name:  Ayaka Arevalo   Age:  80 y.o.  :  1945                                          MRN:  [id number]         Date:  2025      Surgeon: Surgeon(s):  Ernesto Sanford MD    Procedure: Procedure(s):  PARATHYROIDECTOMY    Medications prior to admission:   Prior to Admission medications    Medication Sig Start Date End Date Taking? Authorizing Provider   docusate sodium (COLACE) 50 MG capsule Take 1 capsule by mouth 2 times daily    Provider, MD Sukhdeep   pantoprazole (PROTONIX) 20 MG tablet Take 1 tablet by mouth every morning (before breakfast) 3/11/25   Concepcion Mar APRN   sertraline (ZOLOFT) 100 MG tablet TAKE 1 TABLET BY MOUTH DAILY 25   Concepcion Mar APRN   simvastatin (ZOCOR) 40 MG tablet Take 1 tablet by mouth nightly 25   Concepcion Mar APRN   furosemide (LASIX) 20 MG tablet Take 1 tablet by mouth daily 25   Concepcion Mar APRN   albuterol sulfate HFA (PROVENTIL;VENTOLIN;PROAIR) 108 (90 Base) MCG/ACT inhaler Inhale 1 puff into the lungs every 4 hours as needed for Wheezing or Shortness of Breath 25   Tye Vivar MD   gabapentin (NEURONTIN) 400 MG capsule Take 1 capsule by mouth 3 times daily for 90 days. 25  Tye Vivar MD   traZODone (DESYREL) 100 MG tablet Take 1 tablet by mouth nightly as needed for Sleep 25   Tye Vivar MD   albuterol (PROVENTIL) (2.5 MG/3ML) 0.083% nebulizer solution Take 3 mLs by nebulization every 6 hours as needed for Wheezing 10/14/24   Tye Vivar MD   losartan (COZAAR) 100 MG tablet Take 1 tablet by mouth daily 3/31/24   Ezra Skinner MD   cloNIDine (CATAPRES) 0.1 MG tablet Take 1 tablet by mouth 2 times daily 3/29/24   Ezra Skinner MD   hydrALAZINE (APRESOLINE) 50 MG tablet Take 1 tablet by mouth 2 times daily 3/29/24   Ezra Skinner MD   felodipine (PLENDIL) 5 MG extended release tablet Take 1 tablet by

## 2025-04-25 NOTE — PROGRESS NOTES
Ambulatory Surgery/Procedure Discharge Note    Vitals:    04/25/25 1334   BP: (!) 152/77   Pulse: 72   Resp: 14   Temp: 97 °F (36.1 °C)   SpO2: 92%       Pt is ready for discharge per Emelia score.     In: 2000 [I.V.:2000]  Out: 500 [Urine:500]    Restroom use offered before discharge.  Yes    Pain assessment:  level of pain (1-10, 10 severe),   Pain Level: 4    Pt and S.O./family states \"ready to go home\". Pt alert and oriented x4. IV removed. Denies N/V or pain.  Surgical site is closed with stitches and surgical glue, and is C D and I.  Voided prior to discharge. Pt tolerating po intake. Discharge instructions given to pt and friend with pt permission. Pt and friend verbalized understanding of all instructions. Left with all belongings,  prescriptions, and discharge instructions.     Patient discharged to home/self care. Patient discharged via wheel chair by transporter to waiting family/S.O.       4/25/2025 1:45 PM

## 2025-05-08 ENCOUNTER — OFFICE VISIT (OUTPATIENT)
Dept: ENT CLINIC | Age: 80
End: 2025-05-08

## 2025-05-08 VITALS — DIASTOLIC BLOOD PRESSURE: 79 MMHG | HEART RATE: 59 BPM | SYSTOLIC BLOOD PRESSURE: 135 MMHG | TEMPERATURE: 96.9 F

## 2025-05-08 DIAGNOSIS — Z48.89 POSTOPERATIVE VISIT: Primary | ICD-10-CM

## 2025-05-08 PROCEDURE — 99024 POSTOP FOLLOW-UP VISIT: CPT | Performed by: OTOLARYNGOLOGY

## 2025-05-08 RX ORDER — LOSARTAN POTASSIUM 50 MG/1
TABLET ORAL
COMMUNITY
Start: 2025-04-27

## 2025-05-08 NOTE — PROGRESS NOTES
Status parathyroidectomy.  Doing well. No pain. Voice is good.     Pe: Incision clean, dry and intact.     Mirror exam was performed.  The nasopharynx, larynx,or hypopharynx were examined.  Vocal fold motion was examined.      Pertinent findings include: normal laryngeal motion    A/P: Follow up as needed.

## 2025-05-19 DIAGNOSIS — E21.3 PARATHYROID HORMONE EXCESS: ICD-10-CM

## 2025-05-19 LAB
ALBUMIN SERPL-MCNC: 4.3 G/DL (ref 3.4–5)
ANION GAP SERPL CALCULATED.3IONS-SCNC: 10 MMOL/L (ref 3–16)
BUN SERPL-MCNC: 14 MG/DL (ref 7–20)
CALCIUM SERPL-MCNC: 9.3 MG/DL (ref 8.3–10.6)
CHLORIDE SERPL-SCNC: 106 MMOL/L (ref 99–110)
CO2 SERPL-SCNC: 26 MMOL/L (ref 21–32)
CREAT SERPL-MCNC: 0.9 MG/DL (ref 0.6–1.2)
GFR SERPLBLD CREATININE-BSD FMLA CKD-EPI: 64 ML/MIN/{1.73_M2}
GLUCOSE SERPL-MCNC: 84 MG/DL (ref 70–99)
PHOSPHATE SERPL-MCNC: 3.8 MG/DL (ref 2.5–4.9)
POTASSIUM SERPL-SCNC: 4.1 MMOL/L (ref 3.5–5.1)
PTH-INTACT SERPL-MCNC: 68.6 PG/ML (ref 14–72)
SODIUM SERPL-SCNC: 142 MMOL/L (ref 136–145)

## 2025-05-21 ENCOUNTER — OFFICE VISIT (OUTPATIENT)
Age: 80
End: 2025-05-21
Payer: MEDICARE

## 2025-05-21 VITALS
HEART RATE: 79 BPM | WEIGHT: 153.8 LBS | SYSTOLIC BLOOD PRESSURE: 94 MMHG | BODY MASS INDEX: 24.14 KG/M2 | HEIGHT: 67 IN | DIASTOLIC BLOOD PRESSURE: 60 MMHG | OXYGEN SATURATION: 98 %

## 2025-05-21 DIAGNOSIS — E21.0 PRIMARY HYPERPARATHYROIDISM: Primary | ICD-10-CM

## 2025-05-21 PROCEDURE — 1159F MED LIST DOCD IN RCRD: CPT | Performed by: HOSPITALIST

## 2025-05-21 PROCEDURE — 1124F ACP DISCUSS-NO DSCNMKR DOCD: CPT | Performed by: HOSPITALIST

## 2025-05-21 PROCEDURE — 1090F PRES/ABSN URINE INCON ASSESS: CPT | Performed by: HOSPITALIST

## 2025-05-21 PROCEDURE — G8420 CALC BMI NORM PARAMETERS: HCPCS | Performed by: HOSPITALIST

## 2025-05-21 PROCEDURE — 1036F TOBACCO NON-USER: CPT | Performed by: HOSPITALIST

## 2025-05-21 PROCEDURE — 3074F SYST BP LT 130 MM HG: CPT | Performed by: HOSPITALIST

## 2025-05-21 PROCEDURE — 99214 OFFICE O/P EST MOD 30 MIN: CPT | Performed by: HOSPITALIST

## 2025-05-21 PROCEDURE — 3078F DIAST BP <80 MM HG: CPT | Performed by: HOSPITALIST

## 2025-05-21 PROCEDURE — G8427 DOCREV CUR MEDS BY ELIG CLIN: HCPCS | Performed by: HOSPITALIST

## 2025-05-21 PROCEDURE — G8399 PT W/DXA RESULTS DOCUMENT: HCPCS | Performed by: HOSPITALIST

## 2025-05-21 NOTE — PATIENT INSTRUCTIONS
-Continue to take adequate dietary calcium and vitamin D 1000 units daily as advised    -In general please avoid falls, use night lights at night.     - Please follow-up in 3 months with repeat labs and DEXA scan

## 2025-05-21 NOTE — PROGRESS NOTES
breath sounds present equally, no wheezing present  HEART: S1-S2 present.  Regular rate and rhythm  ABDOMEN: Soft, nontender and nondistended.    EXTREMITIES: No lower extremity edema      Lab Review    Lab Results   Component Value Date/Time     05/19/2025 02:40 PM    K 4.1 05/19/2025 02:40 PM    K 3.9 03/29/2024 05:40 AM     05/19/2025 02:40 PM    CO2 26 05/19/2025 02:40 PM    BUN 14 05/19/2025 02:40 PM    CREATININE 0.9 05/19/2025 02:40 PM    GLUCOSE 84 05/19/2025 02:40 PM    CALCIUM 9.3 05/19/2025 02:40 PM    LABGLOM 64 05/19/2025 02:40 PM    LABGLOM 65 03/30/2024 11:46 AM      Lab Results   Component Value Date/Time    VITD25 36.0 02/19/2025 01:29 PM      Lab Results   Component Value Date    IPTH 68.6 05/19/2025    CALCIUM 9.3 05/19/2025    CAION 1.46 (H) 01/23/2025    PHOS 3.8 05/19/2025     Lab Results   Component Value Date/Time    MG 2.4 02/19/2025 01:29 PM       Lab Results   Component Value Date    CA24 161 02/13/2025     NM parathyroid uptake and scan on 11/26/2024.  Findings are suspicious for parathyroid adenoma at the inferior right thyroid  bed in the appropriate clinical setting.       Assessment:     Assessment & Plan  Primary hyperparathyroidism       Orders:    DEXA BONE DENSITY AXIAL SKELETON; Future    Renal Function Panel; Future    I reviewed labs and operative records.    Patient is here for follow-up of elevated PTH and calcium levels likely from primary hyperparathyroidism status post right inferior parathyroidectomy in April 2025.    Based on their prior labs it appears that this is probably primary hyperparathyroidism with an element of probable excess vitamin D/secondary hyperparathyroidism from her CKD. she does not appear to be on any medications that could lead to hypercalcemia.  She was mostly asymptomatic except for fatigue.  Hence at her initial visit in January 2025 we ordered blood work along with 24-hour urine calcium/creatinine.  This showed elevated total and

## 2025-06-03 DIAGNOSIS — E78.5 HYPERLIPIDEMIA, UNSPECIFIED HYPERLIPIDEMIA TYPE: ICD-10-CM

## 2025-06-03 RX ORDER — SIMVASTATIN 40 MG
TABLET ORAL
Qty: 90 TABLET | Refills: 1 | Status: SHIPPED | OUTPATIENT
Start: 2025-06-03

## 2025-06-03 NOTE — TELEPHONE ENCOUNTER
Future Appointments   Date Time Provider Department Center   7/17/2025  9:00 AM Concepcion Mar APRN [facility] BS ECC DEP   8/27/2025 10:40 AM Mame Granados MD [facility]       Last appt 4/3/25

## 2025-06-11 ENCOUNTER — OFFICE VISIT (OUTPATIENT)
Age: 80
End: 2025-06-11

## 2025-06-11 VITALS
DIASTOLIC BLOOD PRESSURE: 77 MMHG | TEMPERATURE: 98.3 F | HEART RATE: 70 BPM | BODY MASS INDEX: 24.17 KG/M2 | OXYGEN SATURATION: 95 % | SYSTOLIC BLOOD PRESSURE: 133 MMHG | WEIGHT: 154 LBS | HEIGHT: 67 IN

## 2025-06-11 DIAGNOSIS — N39.0 URINARY TRACT INFECTION WITHOUT HEMATURIA, SITE UNSPECIFIED: Primary | ICD-10-CM

## 2025-06-11 DIAGNOSIS — R03.0 ELEVATED BLOOD PRESSURE READING: ICD-10-CM

## 2025-06-11 DIAGNOSIS — R35.0 FREQUENT URINATION: ICD-10-CM

## 2025-06-11 LAB
BILIRUBIN, POC: ABNORMAL
BLOOD URINE, POC: ABNORMAL
CLARITY, POC: CLEAR
COLOR, POC: YELLOW
GLUCOSE URINE, POC: ABNORMAL MG/DL
KETONES, POC: ABNORMAL MG/DL
LEUKOCYTE EST, POC: ABNORMAL
NITRITE, POC: ABNORMAL
PH, POC: 5.5
PROTEIN, POC: ABNORMAL MG/DL
SPECIFIC GRAVITY, POC: 1.01
UROBILINOGEN, POC: 0.2 MG/DL

## 2025-06-11 RX ORDER — PHENAZOPYRIDINE HYDROCHLORIDE 200 MG/1
200 TABLET, FILM COATED ORAL 3 TIMES DAILY PRN
Qty: 6 TABLET | Refills: 0 | Status: SHIPPED | OUTPATIENT
Start: 2025-06-11 | End: 2025-06-14

## 2025-06-11 RX ORDER — SULFAMETHOXAZOLE AND TRIMETHOPRIM 800; 160 MG/1; MG/1
1 TABLET ORAL 2 TIMES DAILY
Qty: 10 TABLET | Refills: 0 | Status: SHIPPED | OUTPATIENT
Start: 2025-06-11 | End: 2025-06-16

## 2025-06-11 NOTE — PROGRESS NOTES
Ayaka Arevalo (:  1945) is a 80 y.o. female,Established patient, here for evaluation of the following chief complaint(s):  Urinary Tract Infection (Frequency , abdominal pain x 5 days )      ASSESSMENT/PLAN:    ICD-10-CM    1. Urinary tract infection without hematuria, site unspecified  N39.0 sulfamethoxazole-trimethoprim (BACTRIM DS;SEPTRA DS) 800-160 MG per tablet     phenazopyridine (PYRIDIUM) 200 MG tablet      2. Frequent urination  R35.0 POCT Urinalysis no Micro      3. Elevated blood pressure reading  R03.0 Amb Referral to Primary Care        Results for orders placed or performed in visit on 25   POCT Urinalysis no Micro   Result Value Ref Range    Color, UA YELLOW     Clarity, UA CLEAR     Glucose, UA POC NEG mg/dL    Bilirubin, UA NEG     Ketones, UA NEG mg/dL    Spec Grav, UA 1.010     Blood, UA POC NEH     pH, UA 5.5     Protein, UA POC NEG mg/dL    Urobilinogen, UA 0.2 mg/dL    Leukocytes, UA TRACE     Nitrite, UA NEG         Dx Disposition: UTI  Education and handout provided on diagnosis and management of symptoms.   AVS reviewed with patient. Follow up as needed in UC or with PCP for new or worsening symptoms.   Return if symptoms worsen or fail to improve.    SUBJECTIVE/OBJECTIVE:  Patient presents today with complaints of UTI symptoms that started 3 days ago      History provided by:  Patient   used: No    Urinary Tract Infection        Vitals:    25 0900 25 0905   BP: (!) 150/85 133/77   BP Site: Right Upper Arm Right Upper Arm   Patient Position: Sitting Sitting   BP Cuff Size: Medium Adult Medium Adult   Pulse: 70    Temp: 98.3 °F (36.8 °C)    TempSrc: Oral    SpO2: 95%    Weight: 69.9 kg (154 lb)    Height: 1.702 m (5' 7\")        Review of Systems    Physical Exam  Constitutional:       Appearance: Normal appearance.   HENT:      Head: Normocephalic.      Nose: Nose normal.      Mouth/Throat:      Mouth: Mucous membranes are moist.

## 2025-06-11 NOTE — PATIENT INSTRUCTIONS
Thank you for allowing us to care for you today and we hope you feel better soon  New Prescriptions    PHENAZOPYRIDINE (PYRIDIUM) 200 MG TABLET    Take 1 tablet by mouth 3 times daily as needed for Pain    SULFAMETHOXAZOLE-TRIMETHOPRIM (BACTRIM DS;SEPTRA DS) 800-160 MG PER TABLET    Take 1 tablet by mouth 2 times daily for 5 days

## 2025-06-12 LAB — BACTERIA UR CULT: NORMAL

## 2025-06-13 DIAGNOSIS — F32.A DEPRESSION, UNSPECIFIED DEPRESSION TYPE: ICD-10-CM

## 2025-06-16 DIAGNOSIS — J45.20 MILD INTERMITTENT ASTHMA WITHOUT COMPLICATION: ICD-10-CM

## 2025-06-16 DIAGNOSIS — R05.1 ACUTE COUGH: ICD-10-CM

## 2025-06-16 RX ORDER — SERTRALINE HYDROCHLORIDE 100 MG/1
100 TABLET, FILM COATED ORAL DAILY
Qty: 90 TABLET | Refills: 1 | Status: SHIPPED | OUTPATIENT
Start: 2025-06-16

## 2025-06-16 NOTE — TELEPHONE ENCOUNTER
Medication:   Requested Prescriptions     Pending Prescriptions Disp Refills    sertraline (ZOLOFT) 100 MG tablet 90 tablet 1     Sig: Take 1 tablet by mouth daily       Last Filled:      Patient Phone Number: [phone] (home) [phone] (work)    Last appt: 4/3/2025   Next appt: 7/17/2025  Future Appointments   Date Time Provider Department Center   7/17/2025  9:00 AM Concepcion Mar APRN [facility]   8/27/2025 10:40 AM Mame Granados MD [facility]

## 2025-06-17 RX ORDER — ALBUTEROL SULFATE 0.83 MG/ML
2.5 SOLUTION RESPIRATORY (INHALATION) EVERY 6 HOURS PRN
Qty: 120 EACH | Refills: 2 | Status: SHIPPED | OUTPATIENT
Start: 2025-06-17

## 2025-06-17 RX ORDER — ALBUTEROL SULFATE 90 UG/1
1 INHALANT RESPIRATORY (INHALATION) EVERY 4 HOURS PRN
Qty: 1 EACH | Refills: 2 | Status: SHIPPED | OUTPATIENT
Start: 2025-06-17

## 2025-06-17 NOTE — TELEPHONE ENCOUNTER
Future Appointments   Date Time Provider Department Center   7/17/2025  9:00 AM Concepcion Mar APRN [facility] ECC DEP   8/27/2025 10:40 AM Mame Granados MD [facility]

## 2025-07-16 PROBLEM — F32.A DEPRESSION: Status: ACTIVE | Noted: 2025-07-16

## 2025-07-17 ENCOUNTER — OFFICE VISIT (OUTPATIENT)
Dept: INTERNAL MEDICINE CLINIC | Age: 80
End: 2025-07-17
Payer: MEDICARE

## 2025-07-17 VITALS
OXYGEN SATURATION: 96 % | HEIGHT: 67 IN | WEIGHT: 150 LBS | BODY MASS INDEX: 23.54 KG/M2 | DIASTOLIC BLOOD PRESSURE: 80 MMHG | SYSTOLIC BLOOD PRESSURE: 122 MMHG | HEART RATE: 69 BPM

## 2025-07-17 DIAGNOSIS — F32.A DEPRESSION, UNSPECIFIED DEPRESSION TYPE: ICD-10-CM

## 2025-07-17 DIAGNOSIS — M51.360 DEGENERATION OF INTERVERTEBRAL DISC OF LUMBAR REGION WITH DISCOGENIC BACK PAIN: ICD-10-CM

## 2025-07-17 DIAGNOSIS — E78.5 HYPERLIPIDEMIA, UNSPECIFIED HYPERLIPIDEMIA TYPE: ICD-10-CM

## 2025-07-17 DIAGNOSIS — E21.0 PRIMARY HYPERPARATHYROIDISM: ICD-10-CM

## 2025-07-17 DIAGNOSIS — I10 PRIMARY HYPERTENSION: Primary | ICD-10-CM

## 2025-07-17 PROCEDURE — G8427 DOCREV CUR MEDS BY ELIG CLIN: HCPCS | Performed by: NURSE PRACTITIONER

## 2025-07-17 PROCEDURE — G8420 CALC BMI NORM PARAMETERS: HCPCS | Performed by: NURSE PRACTITIONER

## 2025-07-17 PROCEDURE — 1124F ACP DISCUSS-NO DSCNMKR DOCD: CPT | Performed by: NURSE PRACTITIONER

## 2025-07-17 PROCEDURE — 3079F DIAST BP 80-89 MM HG: CPT | Performed by: NURSE PRACTITIONER

## 2025-07-17 PROCEDURE — G8399 PT W/DXA RESULTS DOCUMENT: HCPCS | Performed by: NURSE PRACTITIONER

## 2025-07-17 PROCEDURE — 1036F TOBACCO NON-USER: CPT | Performed by: NURSE PRACTITIONER

## 2025-07-17 PROCEDURE — 3074F SYST BP LT 130 MM HG: CPT | Performed by: NURSE PRACTITIONER

## 2025-07-17 PROCEDURE — 1159F MED LIST DOCD IN RCRD: CPT | Performed by: NURSE PRACTITIONER

## 2025-07-17 PROCEDURE — 99214 OFFICE O/P EST MOD 30 MIN: CPT | Performed by: NURSE PRACTITIONER

## 2025-07-17 PROCEDURE — G2211 COMPLEX E/M VISIT ADD ON: HCPCS | Performed by: NURSE PRACTITIONER

## 2025-07-17 PROCEDURE — 1090F PRES/ABSN URINE INCON ASSESS: CPT | Performed by: NURSE PRACTITIONER

## 2025-07-17 ASSESSMENT — ENCOUNTER SYMPTOMS: SHORTNESS OF BREATH: 0

## 2025-07-17 NOTE — PROGRESS NOTES
Chloride 06/23/2025 103  98 - 111 mmol/L Final    CO2 06/23/2025 28  21 - 31 mmol/L Final    eGFR (CKD-EPI) 06/23/2025 49 (L)  >59 mL/min/1.73 m2 Final    Comment: Estimated GFR was calculated using the CKD-EPI cr (2021) equation refit without race.  The   equation is recommended by the [employer] Kid[employer] - American Society of Nephrology   Task Force.      Anion Gap 06/23/2025 5  4 - 16 mmol/L Final    Tested at [facility] [address] [id number]    Uric Acid 06/23/2025 5.1  2.3 - 6.6 mg/dL Final    Tested at [facility] [address] [id number]    Pth Intact 06/23/2025 59.90  15.00 - 65.00 pg/mL Final    Comment: (NOTE)  Intact PTH       Calcium       Interpretation  ----------       -------       --------------  15 - 65         8.6 - 10.2    Normal    > 65           > 10.2       Primary Hyperparathyroidism    < 20           > 10.2       Non-Parathyroid hypercalcemia    < 15           < 8.6        Hypoparathyroidism  Consider the above as guidelines only.  PTH results should be  interpreted in conjunction with the total or ionized calcium  level.  The finding of a persistently high-normal calcium  accompanied by a high-normal PTH (or a low-normal calcium   accompanied by a low-normal PTH) warrants further investigation.   Although the PTH may itself be within normal limits, it may be  inappropriately high (or low) relative to the circulating calcium  level.    Ingestion of juan doses of biotin (>5 mg/day) taken within 8 hours of  drawing blood sample can interfere with this immunoassay test.      Iron 06/23/2025 101  30 - 160 mcg/dL Final    TIBC 06/23/2025 311  250 - 400 mcg/dL Final    Iron % Saturation 06/23/2025 32  20 - 50 % Final    Transferrin 06/23/2025 222  200 - 360 mg/dL Final    Ferritin 06/23/2025 149  30 - 150 ng/mL Final    Comment: (NOTE)  The lower threshold of 30 is not statistically defined, nor  internally validated. The threshold has been

## 2025-07-25 DIAGNOSIS — G47.00 INSOMNIA, UNSPECIFIED TYPE: ICD-10-CM

## 2025-07-25 RX ORDER — TRAZODONE HYDROCHLORIDE 100 MG/1
100 TABLET ORAL NIGHTLY PRN
Qty: 90 TABLET | Refills: 1 | Status: SHIPPED | OUTPATIENT
Start: 2025-07-25

## 2025-07-25 NOTE — TELEPHONE ENCOUNTER
Future Appointments   Date Time Provider Department Center   8/15/2025  8:45 AM [place] DEXA RM 1 [facility]   8/27/2025 10:40 AM Mame Granados MD [facility]   1/22/2026  9:00 AM Concepcion aMr APRN [facility] ECC DEP       Last appt 7/17/25

## 2025-08-15 ENCOUNTER — HOSPITAL ENCOUNTER (OUTPATIENT)
Dept: WOMENS IMAGING | Age: 80
Discharge: HOME | End: 2025-08-15
Attending: HOSPITALIST
Payer: MEDICARE

## 2025-08-15 DIAGNOSIS — E21.0 PRIMARY HYPERPARATHYROIDISM: ICD-10-CM

## 2025-08-15 PROCEDURE — 77080 DXA BONE DENSITY AXIAL: CPT

## 2025-09-05 DIAGNOSIS — K21.9 GASTROESOPHAGEAL REFLUX DISEASE, UNSPECIFIED WHETHER ESOPHAGITIS PRESENT: ICD-10-CM

## 2025-09-05 DIAGNOSIS — R13.10 DYSPHAGIA, UNSPECIFIED TYPE: ICD-10-CM

## 2025-09-05 RX ORDER — PANTOPRAZOLE SODIUM 20 MG/1
20 TABLET, DELAYED RELEASE ORAL
Qty: 90 TABLET | Refills: 1 | Status: SHIPPED | OUTPATIENT
Start: 2025-09-05

## (undated) DEVICE — PROBE 8225101 5PK STD PRASS FL TIP ROHS

## (undated) DEVICE — NEPTUNE E-SEP SMOKE EVACUATION PENCIL, COATED, 70MM BLADE, PUSH BUTTON SWITCH: Brand: NEPTUNE E-SEP

## (undated) DEVICE — SUTURE VICRYL + SZ 3-0 L27IN ABSRB UD L26MM SH 1/2 CIR VCP416H

## (undated) DEVICE — TOWEL,STOP FLAG GOLD N-W: Brand: MEDLINE

## (undated) DEVICE — ELECTRODE 8227410 PAIRED 2 CH SET ROHS

## (undated) DEVICE — STAPLER SKIN H3.9MM WIRE DIA0.58MM CRWN 6.9MM 35 STPL ROT

## (undated) DEVICE — FORCEPS BX 240CM 2.4MM L NDL RAD JAW 4 M00513334

## (undated) DEVICE — SYRINGE IRRIG 60ML SFT PLIABLE BLB EZ TO GRP 1 HND USE W/

## (undated) DEVICE — HEAD & NECK: Brand: MEDLINE INDUSTRIES, INC.

## (undated) DEVICE — LIQUIBAND RAPID ADHESIVE 36/CS 0.8ML: Brand: MEDLINE

## (undated) DEVICE — SUTURE PERMA-HAND SZ 3-0 L144IN NONABSORBABLE BLK LIGAPAK LA54G

## (undated) DEVICE — GLOVE ORANGE PI 7 1/2   MSG9075

## (undated) DEVICE — PACK SURGICAL PROC CUSTOM TISSUE PERFUSION SYSTEM SPY ELITE

## (undated) DEVICE — SUTURE PLN GUT SZ 5-0 L18IN ABSRB YELLOWISH TAN L13MM PC-1 1915G

## (undated) DEVICE — DRAPE,INSTRUMENT,MAGNETIC,10X16: Brand: MEDLINE

## (undated) DEVICE — BITE BLOCK ENDOSCP AD 60 FR W/ ADJ STRP PLAS GRN BLOX

## (undated) DEVICE — TRAP FLUID

## (undated) DEVICE — FORMALIN CLEAR VIAL 20 ML 10%

## (undated) DEVICE — BLANKET WRM W40.2XL55.9IN IORT LO BODY + MISTRAL AIR

## (undated) DEVICE — BLADE ES ELASTOMERIC COAT INSUL DURABLE BEND UPTO 90DEG

## (undated) DEVICE — ENDOSCOPY KIT: Brand: MEDLINE INDUSTRIES, INC.

## (undated) DEVICE — TOWEL,OR,DSP,ST,BLUE,DLX,8/PK,10PK/CS: Brand: MEDLINE